# Patient Record
Sex: MALE | Race: WHITE | Employment: OTHER | URBAN - METROPOLITAN AREA
[De-identification: names, ages, dates, MRNs, and addresses within clinical notes are randomized per-mention and may not be internally consistent; named-entity substitution may affect disease eponyms.]

---

## 2017-09-14 ENCOUNTER — APPOINTMENT (OUTPATIENT)
Dept: GENERAL RADIOLOGY | Age: 82
DRG: 065 | End: 2017-09-14
Attending: EMERGENCY MEDICINE
Payer: MEDICARE

## 2017-09-14 ENCOUNTER — APPOINTMENT (OUTPATIENT)
Dept: CT IMAGING | Age: 82
DRG: 065 | End: 2017-09-14
Attending: EMERGENCY MEDICINE
Payer: MEDICARE

## 2017-09-14 ENCOUNTER — APPOINTMENT (OUTPATIENT)
Dept: ULTRASOUND IMAGING | Age: 82
DRG: 065 | End: 2017-09-14
Attending: NURSE PRACTITIONER
Payer: MEDICARE

## 2017-09-14 ENCOUNTER — HOSPITAL ENCOUNTER (INPATIENT)
Age: 82
LOS: 8 days | Discharge: NURSING, REHAB OR LONG-TERM CARE | DRG: 065 | End: 2017-09-22
Attending: EMERGENCY MEDICINE | Admitting: INTERNAL MEDICINE
Payer: MEDICARE

## 2017-09-14 DIAGNOSIS — I63.311 CEREBROVASCULAR ACCIDENT (CVA) DUE TO THROMBOSIS OF RIGHT MIDDLE CEREBRAL ARTERY (HCC): Primary | ICD-10-CM

## 2017-09-14 PROBLEM — I63.9 STROKE (HCC): Status: ACTIVE | Noted: 2017-09-14

## 2017-09-14 LAB
ALBUMIN SERPL-MCNC: 3.4 G/DL (ref 3.2–4.6)
ALBUMIN/GLOB SERPL: 1 {RATIO} (ref 1.2–3.5)
ALP SERPL-CCNC: 67 U/L (ref 50–136)
ALT SERPL-CCNC: 32 U/L (ref 12–65)
ANION GAP SERPL CALC-SCNC: 11 MMOL/L (ref 7–16)
AST SERPL-CCNC: 37 U/L (ref 15–37)
ATRIAL RATE: 300 BPM
BASOPHILS # BLD: 0 K/UL (ref 0–0.2)
BASOPHILS NFR BLD: 0 % (ref 0–2)
BILIRUB SERPL-MCNC: 0.6 MG/DL (ref 0.2–1.1)
BUN SERPL-MCNC: 38 MG/DL (ref 8–23)
CALCIUM SERPL-MCNC: 9.1 MG/DL (ref 8.3–10.4)
CALCULATED R AXIS, ECG10: 116 DEGREES
CALCULATED T AXIS, ECG11: 24 DEGREES
CHLORIDE SERPL-SCNC: 106 MMOL/L (ref 98–107)
CO2 SERPL-SCNC: 23 MMOL/L (ref 21–32)
CREAT SERPL-MCNC: 1.39 MG/DL (ref 0.8–1.5)
DIAGNOSIS, 93000: NORMAL
DIFFERENTIAL METHOD BLD: ABNORMAL
EOSINOPHIL # BLD: 0 K/UL (ref 0–0.8)
EOSINOPHIL NFR BLD: 0 % (ref 0.5–7.8)
ERYTHROCYTE [DISTWIDTH] IN BLOOD BY AUTOMATED COUNT: 13.3 % (ref 11.9–14.6)
GLOBULIN SER CALC-MCNC: 3.5 G/DL (ref 2.3–3.5)
GLUCOSE BLD STRIP.AUTO-MCNC: 121 MG/DL (ref 65–100)
GLUCOSE SERPL-MCNC: 126 MG/DL (ref 65–100)
HCT VFR BLD AUTO: 43.6 % (ref 41.1–50.3)
HGB BLD-MCNC: 15.6 G/DL (ref 13.6–17.2)
IMM GRANULOCYTES # BLD: 0 K/UL (ref 0–0.5)
IMM GRANULOCYTES NFR BLD: 0.1 % (ref 0–5)
INR BLD: 0.9 (ref 0.9–1.2)
LYMPHOCYTES # BLD: 0.7 K/UL (ref 0.5–4.6)
LYMPHOCYTES NFR BLD: 8 % (ref 13–44)
MCH RBC QN AUTO: 33.1 PG (ref 26.1–32.9)
MCHC RBC AUTO-ENTMCNC: 35.8 G/DL (ref 31.4–35)
MCV RBC AUTO: 92.4 FL (ref 79.6–97.8)
MONOCYTES # BLD: 0.5 K/UL (ref 0.1–1.3)
MONOCYTES NFR BLD: 6 % (ref 4–12)
NEUTS SEG # BLD: 7.7 K/UL (ref 1.7–8.2)
NEUTS SEG NFR BLD: 86 % (ref 43–78)
PLATELET # BLD AUTO: 147 K/UL (ref 150–450)
PMV BLD AUTO: 12.3 FL (ref 10.8–14.1)
POTASSIUM SERPL-SCNC: 4.3 MMOL/L (ref 3.5–5.1)
PROT SERPL-MCNC: 6.9 G/DL (ref 6.3–8.2)
PT BLD: 11.4 SECS (ref 9.6–11.6)
Q-T INTERVAL, ECG07: 396 MS
QRS DURATION, ECG06: 98 MS
QTC CALCULATION (BEZET), ECG08: 433 MS
RBC # BLD AUTO: 4.72 M/UL (ref 4.23–5.67)
SODIUM SERPL-SCNC: 140 MMOL/L (ref 136–145)
TROPONIN I BLD-MCNC: 0.01 NG/ML (ref 0.02–0.05)
VENTRICULAR RATE, ECG03: 72 BPM
WBC # BLD AUTO: 8.9 K/UL (ref 4.3–11.1)

## 2017-09-14 PROCEDURE — 82962 GLUCOSE BLOOD TEST: CPT

## 2017-09-14 PROCEDURE — 85610 PROTHROMBIN TIME: CPT | Performed by: EMERGENCY MEDICINE

## 2017-09-14 PROCEDURE — 77030032490 HC SLV COMPR SCD KNE COVD -B

## 2017-09-14 PROCEDURE — 74011250637 HC RX REV CODE- 250/637: Performed by: INTERNAL MEDICINE

## 2017-09-14 PROCEDURE — 71010 XR CHEST PORT: CPT

## 2017-09-14 PROCEDURE — 86580 TB INTRADERMAL TEST: CPT | Performed by: INTERNAL MEDICINE

## 2017-09-14 PROCEDURE — 93880 EXTRACRANIAL BILAT STUDY: CPT

## 2017-09-14 PROCEDURE — 85025 COMPLETE CBC W/AUTO DIFF WBC: CPT | Performed by: EMERGENCY MEDICINE

## 2017-09-14 PROCEDURE — 93005 ELECTROCARDIOGRAM TRACING: CPT | Performed by: EMERGENCY MEDICINE

## 2017-09-14 PROCEDURE — 84484 ASSAY OF TROPONIN QUANT: CPT

## 2017-09-14 PROCEDURE — 99285 EMERGENCY DEPT VISIT HI MDM: CPT | Performed by: EMERGENCY MEDICINE

## 2017-09-14 PROCEDURE — 65660000000 HC RM CCU STEPDOWN

## 2017-09-14 PROCEDURE — 94762 N-INVAS EAR/PLS OXIMTRY CONT: CPT | Performed by: EMERGENCY MEDICINE

## 2017-09-14 PROCEDURE — 85610 PROTHROMBIN TIME: CPT

## 2017-09-14 PROCEDURE — 80053 COMPREHEN METABOLIC PANEL: CPT | Performed by: EMERGENCY MEDICINE

## 2017-09-14 PROCEDURE — 74011250636 HC RX REV CODE- 250/636: Performed by: INTERNAL MEDICINE

## 2017-09-14 PROCEDURE — 92610 EVALUATE SWALLOWING FUNCTION: CPT

## 2017-09-14 PROCEDURE — 70450 CT HEAD/BRAIN W/O DYE: CPT

## 2017-09-14 PROCEDURE — 74011000302 HC RX REV CODE- 302: Performed by: INTERNAL MEDICINE

## 2017-09-14 RX ORDER — METOPROLOL TARTRATE 5 MG/5ML
5 INJECTION INTRAVENOUS
Status: DISCONTINUED | OUTPATIENT
Start: 2017-09-14 | End: 2017-09-15

## 2017-09-14 RX ORDER — ACETAMINOPHEN 650 MG/1
650 SUPPOSITORY RECTAL
Status: DISCONTINUED | OUTPATIENT
Start: 2017-09-14 | End: 2017-09-22 | Stop reason: HOSPADM

## 2017-09-14 RX ORDER — MORPHINE SULFATE 2 MG/ML
1 INJECTION, SOLUTION INTRAMUSCULAR; INTRAVENOUS
Status: DISCONTINUED | OUTPATIENT
Start: 2017-09-14 | End: 2017-09-15

## 2017-09-14 RX ORDER — SODIUM CHLORIDE 0.9 % (FLUSH) 0.9 %
5-10 SYRINGE (ML) INJECTION AS NEEDED
Status: DISCONTINUED | OUTPATIENT
Start: 2017-09-14 | End: 2017-09-22 | Stop reason: HOSPADM

## 2017-09-14 RX ORDER — METOPROLOL TARTRATE 25 MG/1
12.5 TABLET, FILM COATED ORAL DAILY
Status: DISCONTINUED | OUTPATIENT
Start: 2017-09-15 | End: 2017-09-14

## 2017-09-14 RX ORDER — ENALAPRILAT 1.25 MG/ML
0.62 INJECTION INTRAVENOUS
Status: DISCONTINUED | OUTPATIENT
Start: 2017-09-14 | End: 2017-09-15

## 2017-09-14 RX ORDER — ASPIRIN 325 MG
325 TABLET ORAL ONCE
Status: COMPLETED | OUTPATIENT
Start: 2017-09-14 | End: 2017-09-14

## 2017-09-14 RX ORDER — FUROSEMIDE 40 MG/1
40 TABLET ORAL DAILY
Status: CANCELLED | OUTPATIENT
Start: 2017-09-15

## 2017-09-14 RX ORDER — CHOLECALCIFEROL (VITAMIN D3) 125 MCG
1 CAPSULE ORAL DAILY
COMMUNITY
End: 2018-01-25

## 2017-09-14 RX ORDER — ATORVASTATIN CALCIUM 40 MG/1
40 TABLET, FILM COATED ORAL DAILY
Status: DISCONTINUED | OUTPATIENT
Start: 2017-09-15 | End: 2017-09-16

## 2017-09-14 RX ORDER — HYDRALAZINE HYDROCHLORIDE 20 MG/ML
10 INJECTION INTRAMUSCULAR; INTRAVENOUS
Status: DISCONTINUED | OUTPATIENT
Start: 2017-09-14 | End: 2017-09-22 | Stop reason: HOSPADM

## 2017-09-14 RX ORDER — TAMSULOSIN HYDROCHLORIDE 0.4 MG/1
0.4 CAPSULE ORAL DAILY
Status: DISCONTINUED | OUTPATIENT
Start: 2017-09-15 | End: 2017-09-14

## 2017-09-14 RX ORDER — ASPIRIN 325 MG
325 TABLET ORAL DAILY
Status: DISCONTINUED | OUTPATIENT
Start: 2017-09-15 | End: 2017-09-15

## 2017-09-14 RX ORDER — AMLODIPINE BESYLATE 5 MG/1
5 TABLET ORAL DAILY
Status: DISCONTINUED | OUTPATIENT
Start: 2017-09-15 | End: 2017-09-14

## 2017-09-14 RX ORDER — SODIUM CHLORIDE 9 MG/ML
75 INJECTION, SOLUTION INTRAVENOUS CONTINUOUS
Status: DISCONTINUED | OUTPATIENT
Start: 2017-09-14 | End: 2017-09-22 | Stop reason: HOSPADM

## 2017-09-14 RX ORDER — SODIUM CHLORIDE 0.9 % (FLUSH) 0.9 %
5-10 SYRINGE (ML) INJECTION EVERY 8 HOURS
Status: DISCONTINUED | OUTPATIENT
Start: 2017-09-14 | End: 2017-09-22 | Stop reason: HOSPADM

## 2017-09-14 RX ORDER — KETOROLAC TROMETHAMINE 15 MG/ML
15 INJECTION, SOLUTION INTRAMUSCULAR; INTRAVENOUS
Status: DISCONTINUED | OUTPATIENT
Start: 2017-09-14 | End: 2017-09-15

## 2017-09-14 RX ORDER — LISINOPRIL 20 MG/1
40 TABLET ORAL DAILY
Status: DISCONTINUED | OUTPATIENT
Start: 2017-09-15 | End: 2017-09-14

## 2017-09-14 RX ORDER — ENOXAPARIN SODIUM 100 MG/ML
40 INJECTION SUBCUTANEOUS EVERY 24 HOURS
Status: DISCONTINUED | OUTPATIENT
Start: 2017-09-14 | End: 2017-09-22 | Stop reason: HOSPADM

## 2017-09-14 RX ADMIN — SODIUM CHLORIDE 75 ML/HR: 900 INJECTION, SOLUTION INTRAVENOUS at 16:11

## 2017-09-14 RX ADMIN — ASPIRIN 325 MG ORAL TABLET 325 MG: 325 PILL ORAL at 14:41

## 2017-09-14 RX ADMIN — ENOXAPARIN SODIUM 40 MG: 40 INJECTION SUBCUTANEOUS at 16:11

## 2017-09-14 RX ADMIN — TUBERCULIN PURIFIED PROTEIN DERIVATIVE 5 UNITS: 5 INJECTION, SOLUTION INTRADERMAL at 18:07

## 2017-09-14 NOTE — IP AVS SNAPSHOT
Guzman Rivera 
 
 
 [address] [facility] [address] [facility] 
[phone] Patient: Fani Sanchez 
MRN: [id number] :1934 Current Discharge Medication List  
  
ASK your doctor about these medications Dose & Instructions Dispensing Information Comments Morning Noon Evening Bedtime  
 amLODIPine 5 mg tablet Commonly known as:  Chandra Wright Your last dose was: Your next dose is:    
   
   
 Dose:  5 mg Take 5 mg by mouth every morning. Refills:  0  
     
   
   
   
  
 arginine 500 mg tablet Your last dose was: Your next dose is:    
   
   
 Dose:  500 mg Take 500 mg by mouth every morning. Refills:  0  
     
   
   
   
  
 aspirin 81 mg chewable tablet Your last dose was: Your next dose is:    
   
   
 Dose:  81 mg Take 1 Tab by mouth daily. Refills:  0  
     
   
   
   
  
 atorvastatin 40 mg tablet Commonly known as:  LIPITOR Your last dose was: Your next dose is:    
   
   
 Dose:  40 mg Take 40 mg by mouth daily. Refills:  0  
     
   
   
   
  
 esomeprazole 20 mg capsule Commonly known as:  [employer] Your last dose was: Your next dose is:    
   
   
 Dose:  20 mg Take 20 mg by mouth daily. Refills:  0  
     
   
   
   
  
 FLOMAX 0.4 mg capsule Generic drug:  tamsulosin Your last dose was: Your next dose is:    
   
   
 Dose:  0.4 mg Take 0.4 mg by mouth daily. Refills:  0  
     
   
   
   
  
 furosemide 40 mg tablet Commonly known as:  LASIX Your last dose was: Your next dose is:    
   
   
 Dose:  40 mg Take 1 Tab by mouth daily. Quantity:  90 Tab Refills:  3 GLUCOSAMINE &CHONDROIT-MV-MIN3 PO Your last dose was: Your next dose is:    
   
   
 Dose:  2 Tab Take 2 Tabs by mouth every morning. Refills:  0  GLUCOSAMINE 1500 COMPLEX PO  
   
 Your last dose was: Your next dose is:    
   
   
 Dose:  1 Tab Take 1 Tab by mouth daily. Refills:  0  
     
   
   
   
  
 lisinopril 40 mg tablet Commonly known as:  Ba Sharona Your last dose was: Your next dose is:    
   
   
 Dose:  40 mg Take 1 Tab by mouth every morning. Quantity:  30 Tab Refills:  6  
     
   
   
   
  
 lutein 6 mg Cap Your last dose was: Your next dose is:    
   
   
 Dose:  1 Tab Take 1 Tab by mouth every morning. Refills:  0  
     
   
   
   
  
 magnesium 250 mg Tab Your last dose was: Your next dose is:    
   
   
 Dose:  250 mg Take 250 mg by mouth daily. Refills:  0  
     
   
   
   
  
 metoprolol tartrate 25 mg tablet Commonly known as:  LOPRESSOR Your last dose was: Your next dose is:    
   
   
 Dose:  12.5 mg Take 0.5 Tabs by mouth every morning. Quantity:  30 Tab Refills:  6 MULTI-VITAMIN PO Your last dose was: Your next dose is:    
   
   
 Dose:  1 Tab Take 1 Tab by mouth every morning. Refills:  0  
     
   
   
   
  
 naproxen 500 mg tablet Commonly known as:  NAPROSYN Your last dose was: Your next dose is: TAKE ONE TABLET BY MOUTH ONCE DAILY AS NEEDED Quantity:  30 Tab Refills:  0  
     
   
   
   
  
 nitroglycerin 0.4 mg SL tablet Commonly known as:  NITROSTAT Your last dose was: Your next dose is:    
   
   
 Dose:  0.4 mg  
1 Tab by SubLINGual route every five (5) minutes as needed for Chest Pain. Quantity:  2 Bottle Refills:  4  
     
   
   
   
  
 omega-3 fatty acids 1,000 mg Cap Your last dose was: Your next dose is:    
   
   
 Dose:  1 Tab Take 1 Tab by mouth every morning. Refills:  0 OMEGA-3 PO Your last dose was:     
   
Your next dose is:    
   
   
 Dose:  1200 mg  
 Take 1,200 mg by mouth daily. Refills:  0  
     
   
   
   
  
 potassium 99 mg tablet Your last dose was: Your next dose is:    
   
   
 Dose:  99 mg Take 99 mg by mouth daily. Refills:  0  
     
   
   
   
  
 SAW PALMETTO PO What changed:  Another medication with the same name was removed. Continue taking this medication, and follow the directions you see here. Your last dose was: Your next dose is:    
   
   
 Dose:  450 mg Take 450 mg by mouth daily. Refills:  0  
     
   
   
   
  
 sildenafil citrate 100 mg tablet Commonly known as:  VIAGRA Your last dose was: Your next dose is:    
   
   
 Dose:  100 mg Take 1 Tab by mouth as needed. Quantity:  10 Tab Refills:  3 VITAMIN C 1,000 mg tablet Generic drug:  ascorbic acid (vitamin C) Your last dose was: Your next dose is:    
   
   
 Dose:  1000 mg Take 1,000 mg by mouth every morning. Refills:  0  
     
   
   
   
  
 VITAMIN D3 2,000 unit Tab Generic drug:  cholecalciferol (vitamin D3) Your last dose was: Your next dose is:    
   
   
 Dose:  1 Tab Take 1 Tab by mouth daily. Refills:  0  
     
   
   
   
  
 zinc 50 mg capsule Your last dose was: Your next dose is:    
   
   
 Dose:  50 mg Take 50 mg by mouth every morning. Refills:  0

## 2017-09-14 NOTE — PROGRESS NOTES
TRANSFER - IN REPORT:    Verbal report received from Denisha(name) on Dorinda Segovia  being received from ED(unit) for routine progression of care      Report consisted of patients Situation, Background, Assessment and   Recommendations(SBAR). Information from the following report(s) SBAR, Kardex and MAR was reviewed with the receiving nurse. Opportunity for questions and clarification was provided. Assessment completed upon patients arrival to unit and care assumed.

## 2017-09-14 NOTE — ED TRIAGE NOTES
Pt arrives via EMS via stroke alert. PT was last seen at norm at 230AM, pt is having obvious left sided neglect. No blood thinners, no hx of stroke.   [id number], NKA, 190/80  RACE score 6 or 7 per EMS

## 2017-09-14 NOTE — ED NOTES
TRANSFER - OUT REPORT:    Verbal report given to Jyotsna Soriano RN on Carmen Villanueva  being transferred to 7th floor for routine progression of care       Report consisted of patients Situation, Background, Assessment and   Recommendations(SBAR). Information from the following report(s) ED Summary was reviewed with the receiving nurse. Lines:   Peripheral IV 09/14/17 Left Antecubital (Active)   Site Assessment Clean, dry, & intact 9/14/2017  1:04 PM   Phlebitis Assessment 0 9/14/2017  1:04 PM   Infiltration Assessment 0 9/14/2017  1:04 PM   Dressing Status Clean, dry, & intact 9/14/2017  1:04 PM       Peripheral IV 09/14/17 Right Antecubital (Active)   Site Assessment Clean, dry, & intact 9/14/2017  1:04 PM   Phlebitis Assessment 0 9/14/2017  1:04 PM   Infiltration Assessment 0 9/14/2017  1:04 PM   Dressing Status Clean, dry, & intact 9/14/2017  1:04 PM        Opportunity for questions and clarification was provided.       Patient transported with:   [employer]

## 2017-09-14 NOTE — PROGRESS NOTES
Primary Nurse Pratima Argueta and Quiana Craig, RN performed a dual skin assessment on this patient No impairment noted  Julian score is 13

## 2017-09-14 NOTE — H&P
HOSPITALIST H&P/CONSULT  NAME:  Merlinda Downing   Age:  80 y.o.  :   1934   MRN:   [id number]  PCP: Cedric Rios MD  Consulting MD:  Treatment Team: Attending Provider: Ty Mendez MD; Primary Nurse: Flor Mccarty RN  HPI:     Pt is a 79 yo male who presents to ER this afternoon with left facial droop, slurred speech, LUE weakness/numbness noted on awakening a few hours ago. Pt was last seen \"normal\" at 0230 this morning. PMH includes CAD, CABG, paroxysmal A Fib, HTN, HLD. CT head reveals right MCA infarct. Pt given  mg. He does not participate full in NIHSS scale due to drowsiness but neuro exam reveals A&O x 3 although some word salad but eventually answers orientation questions correctly, slurred speech, left facial droop, LUE with flexion to pain but no purposeful movement, dulled sensation LUE, left side neglect, eyes do not cross midline to left. He has already been evaluated by speech who recommends continued NPO for now due to delayed swallowing. He is in A Fib on monitor. BP slightly elevated, has not had any of his typical am meds. Follows with [employer]. Wife at bedside and plan of care discussed thoroughly.       Complete ROS done and is as stated in HPI or otherwise negative  Past Medical History:   Diagnosis Date    Arrhythmia     reason for current procedure    Arthritis     osteo    CAD (coronary artery disease) 1999    CABG no stents    Carotid artery stenosis without cerebral infarction 2016    Chronic pain     arthritic    Claudication [facility])     Coronary atherosclerosis of native coronary artery 2016    GERD (gastroesophageal reflux disease)     controlled with meds    Hyperlipidemia     Hypertension     controlled with meds    Kidney stones     hx of    MI ([facility] infarction) [facility] [address] 1999    PAF (paroxysmal atrial fibrillation) [facility] [address]     Thromboembolus ([facility] [address]       Past Surgical History:   Procedure Laterality Date    ABDOMEN SURGERY PROC UNLISTED  ? hernia repair    CARDIAC SURG PROCEDURE UNLIST  1999    CABG no stents    HX APPENDECTOMY  age 15    HX HEENT  12's?    left cataract    HX PTCA        Prior to Admission Medications   Prescriptions Last Dose Informant Patient Reported? Taking? GLUCOSAM & CHONDROIT-MV & MIN3 (GLUCOSAMINE &CHONDROIT-MV-MIN3 PO)   Yes No   Sig: Take 2 Tabs by mouth every morning. MULTI-VITAMIN PO   Yes No   Sig: Take 1 Tab by mouth every morning. amlodipine (NORVASC) 5 mg tablet   Yes No   Sig: Take 5 mg by mouth every morning. arginine 500 mg tablet   Yes No   Sig: Take 500 mg by mouth every morning. ascorbic acid (VITAMIN C) 1,000 mg tablet   Yes No   Sig: Take 1,000 mg by mouth every morning. aspirin 81 mg chewable tablet   Yes No   Sig: Take 1 Tab by mouth daily. atorvastatin (LIPITOR) 40 mg tablet   Yes No   Sig: Take 40 mg by mouth daily. esomeprazole (NEXIUM) 20 mg capsule   Yes No   Sig: Take 20 mg by mouth daily. furosemide (LASIX) 40 mg tablet   No No   Sig: Take 1 Tab by mouth daily. glipiZIDE (GLUCOTROL) 5 mg tablet   Yes No   Sig: Take  by mouth two (2) times a day. Prn per pt   lisinopril (PRINIVIL, ZESTRIL) 40 mg tablet   No No   Sig: Take 1 Tab by mouth every morning. lutein 6 mg Cap   Yes No   Sig: Take 1 Tab by mouth every morning.   magnesium 250 mg tab   Yes No   Sig: Take 250 mg by mouth daily. metoprolol tartrate (LOPRESSOR) 25 mg tablet   No No   Sig: Take 0.5 Tabs by mouth every morning. naproxen (NAPROSYN) 500 mg tablet   No No   Sig: TAKE ONE TABLET BY MOUTH ONCE DAILY AS NEEDED   nitroglycerin (NITROSTAT) 0.4 mg SL tablet   No No   Si Tab by SubLINGual route every five (5) minutes as needed for Chest Pain. omega-3 fatty acids 1,000 mg Cap   Yes No   Sig: Take 1 Tab by mouth every morning. saw palmetto 160 mg Cap   Yes No   Sig: Take 1 Tab by mouth every morning.    sildenafil citrate (VIAGRA) 100 mg tablet   No No   Sig: Take 1 Tab by mouth as needed. tamsulosin (FLOMAX) 0.4 mg capsule   Yes No   Sig: Take 0.4 mg by mouth daily. zinc 50 mg capsule   Yes No   Sig: Take 50 mg by mouth every morning. Facility-Administered Medications: None     No Known Allergies   Social History   Substance Use Topics    Smoking status: Former Smoker     Packs/day: 1.50     Years: 35.00     Quit date: 10/1/1986    Smokeless tobacco: Not on file      Comment: quit     Alcohol use 4.0 oz/week     7 Glasses of wine, 1 Shots of liquor per week      Comment: minimal      Family History   Problem Relation Age of Onset    Coronary Artery Disease Other      family history      Objective:     Visit Vitals    /86    Pulse 80    Temp 97.8 °F (36.6 °C)    Resp 16    Ht 5' 10\" (1.778 m)    Wt 69.4 kg (153 lb)    SpO2 94%    BMI 21.95 kg/m2      Temp (24hrs), Av.8 °F (36.6 °C), Min:97.8 °F (36.6 °C), Max:97.8 °F (36.6 °C)    Oxygen Therapy  O2 Sat (%): 94 % (17 1430)  Pulse via Oximetry: 78 beats per minute (17 1430)    Physical Exam:  General:    Alert, cooperative, no distress, appears stated age. Head:   Normocephalic, without obvious abnormality, atraumatic. Nose:  Nares normal. No drainage or sinus tenderness. Lungs:   Clear to auscultation bilaterally. No Wheezing or Rhonchi. No rales. Heart:   Irreg irreg,  no murmur, rub or gallop. Abdomen:   Soft, non-tender. Not distended. Bowel sounds normal.   Extremities: No cyanosis. No edema. No clubbing  Skin:     Texture, turgor normal. No rashes or lesions.   Not Jaundiced  Neurologic: Alert and oriented x 3 although some word salad but eventually answers orientation questions correctly, slurred speech, left facial   droop, LUE with flexion to pain but no purposeful movement, dulled sensation LUE, left side neglect, eyes do not cross midline to left completely     Data Review:   Recent Results (from the past 24 hour(s))   EKG, 12 LEAD, INITIAL    Collection Time: 09/14/17 12:50 PM   Result Value Ref Range    Ventricular Rate 72 BPM    Atrial Rate 300 BPM    QRS Duration 98 ms    Q-T Interval 396 ms    QTC Calculation (Bezet) 433 ms    Calculated R Axis 116 degrees    Calculated T Axis 24 degrees    Diagnosis       !! AGE AND GENDER SPECIFIC ECG ANALYSIS !! Atrial fibrillation  Lateral infarct (cited on or before 07-NOV-2015)  Abnormal ECG  When compared with ECG of 07-NOV-2015 20:52,  Atrial fibrillation has replaced Sinus rhythm  ST now depressed in Inferior leads     GLUCOSE, POC    Collection Time: 09/14/17 12:56 PM   Result Value Ref Range    Glucose (POC) 121 (H) 65 - 100 mg/dL   CBC WITH AUTOMATED DIFF    Collection Time: 09/14/17 12:57 PM   Result Value Ref Range    WBC 8.9 4.3 - 11.1 K/uL    RBC 4.72 4.23 - 5.67 M/uL    HGB 15.6 13.6 - 17.2 g/dL    HCT 43.6 41.1 - 50.3 %    MCV 92.4 79.6 - 97.8 FL    MCH 33.1 (H) 26.1 - 32.9 PG    MCHC 35.8 (H) 31.4 - 35.0 g/dL    RDW 13.3 11.9 - 14.6 %    PLATELET 735 (L) 988 - 450 K/uL    MPV 12.3 10.8 - 14.1 FL    DF AUTOMATED      NEUTROPHILS 86 (H) 43 - 78 %    LYMPHOCYTES 8 (L) 13 - 44 %    MONOCYTES 6 4.0 - 12.0 %    EOSINOPHILS 0 (L) 0.5 - 7.8 %    BASOPHILS 0 0.0 - 2.0 %    IMMATURE GRANULOCYTES 0.1 0.0 - 5.0 %    ABS. NEUTROPHILS 7.7 1.7 - 8.2 K/UL    ABS. LYMPHOCYTES 0.7 0.5 - 4.6 K/UL    ABS. MONOCYTES 0.5 0.1 - 1.3 K/UL    ABS. EOSINOPHILS 0.0 0.0 - 0.8 K/UL    ABS. BASOPHILS 0.0 0.0 - 0.2 K/UL    ABS. IMM.  GRANS. 0.0 0.0 - 0.5 K/UL   METABOLIC PANEL, COMPREHENSIVE    Collection Time: 09/14/17 12:57 PM   Result Value Ref Range    Sodium 140 136 - 145 mmol/L    Potassium 4.3 3.5 - 5.1 mmol/L    Chloride 106 98 - 107 mmol/L    CO2 23 21 - 32 mmol/L    Anion gap 11 7 - 16 mmol/L    Glucose 126 (H) 65 - 100 mg/dL    BUN 38 (H) 8 - 23 MG/DL    Creatinine 1.39 0.8 - 1.5 MG/DL    GFR est AA >60 >60 ml/min/1.73m2    GFR est non-AA 52 (L) >60 ml/min/1.73m2    Calcium 9.1 8.3 - 10.4 MG/DL    Bilirubin, total 0.6 0.2 - 1.1 MG/DL ALT (SGPT) 32 12 - 65 U/L    AST (SGOT) 37 15 - 37 U/L    Alk. phosphatase 67 50 - 136 U/L    Protein, total 6.9 6.3 - 8.2 g/dL    Albumin 3.4 3.2 - 4.6 g/dL    Globulin 3.5 2.3 - 3.5 g/dL    A-G Ratio 1.0 (L) 1.2 - 3.5     POC TROPONIN-I    Collection Time: 09/14/17  1:00 PM   Result Value Ref Range    Troponin-I (POC) 0.01 (L) 0.02 - 0.05 ng/ml   POC PT/INR    Collection Time: 09/14/17  1:01 PM   Result Value Ref Range    Prothrombin time (POC) 11.4 9.6 - 11.6 SECS    INR (POC) 0.9 0.9 - 1.2       Imaging /Procedures /Studies     9/14 CT Head IMPRESSION: Probable subacute right MCA CVA. Note: If a subtle CVA is suspected, MRI would be more definitive if clinically warranted. Assessment and Plan: Active Hospital Problems    Diagnosis Date Noted    Stroke [facility]) 09/14/2017       A/P:    Acute right MCA infarct- Increase ASA to 325 mg, cont Lipitor. Check MRI brain, carotid duplex, echo. Remote tele. Cont ST/PT/OT. Nursing to repeat NIHSS scale q shift x 24 hours. A Fib- Will need to start on [facility] but will hold off on this in setting of acute CVA. Cont Lopressor    HTN- Cont home regimen Norvasc, Lisinopril. Hold Lasix while pt is NPO.   PRN Apresoline for SBP > 180     DC planning- Consult SW for possible home health needs, will place ppd incase STR indicated    DVT Prophylaxis:  Lovenox   Code Status: Full   Anticipated discharge: 48-72 hours     Signed By: Christelle Morrow NP     September 14, 2017

## 2017-09-14 NOTE — PROGRESS NOTES
Admission database completed. Patient oriented to room and call button.    Medication side effects fact sheet and stroke education book given to patient and spouse and reviewed No concerns voiced at this time Primary nurse  updated

## 2017-09-14 NOTE — ED NOTES
Pt given aspirin tablet crushed and in applesauce per SLP recommendation. On dysphagia screen, pt had difficulty with liquids at this time. RN at bedside to administer medication. Pt tolerated applesauce.

## 2017-09-14 NOTE — ED PROVIDER NOTES
HPI Comments: shana went to bed at 2:30am this morning. Woke up an hour ago and wife noted patient to have left facial droop and left upper and lower extremity weakness. EMS brought patient here. Patient is a 80 y.o. male presenting with weakness. The history is provided by the patient and the EMS personnel. No  was used. Extremity Weakness    This is a new problem. Episode onset: 0230. The problem occurs constantly. The problem has not changed since onset. The patient is experiencing no pain. Associated symptoms include limited range of motion. He has tried nothing for the symptoms. Past Medical History:   Diagnosis Date    Arrhythmia     reason for current procedure    Arthritis     osteo    CAD (coronary artery disease) 12/1999    CABG no stents    Carotid artery stenosis without cerebral infarction 1/18/2016    Chronic pain     arthritic    Claudication [facility])     Coronary atherosclerosis of native coronary artery 1/18/2016    GERD (gastroesophageal reflux disease)     controlled with meds    Hyperlipidemia     Hypertension     controlled with meds    Kidney stones     hx of    MI (myocardial infarction) ([facility] [address] 12/1999    PAF [facility]oxysmal atrial fibrillation) [facility] [facility]     Thromboembolus ([facility] [address]        Past Surgical History:   Procedure Laterality Date    ABDOMEN SURGERY PROC UNLISTED  2000? hernia repair    CARDIAC SURG PROCEDURE UNLIST  12/1999    CABG no stents    HX APPENDECTOMY  age 15    HX HEENT  12's?    left cataract    HX PTCA           Family History:   Problem Relation Age of Onset    Coronary Artery Disease Other      family history       Social History     Social History    Marital status:      Spouse name: N/A    Number of children: N/A    Years of education: N/A     Occupational History    Not on file.      Social History Main Topics    Smoking status: Former Smoker     Packs/day: 1.50     Years: 35.00     Quit date: 10/1/1986    Smokeless tobacco: Not on file      Comment: quit 1980's    Alcohol use 4.0 oz/week     7 Glasses of wine, 1 Shots of liquor per week      Comment: minimal    Drug use: No    Sexual activity: Not on file     Other Topics Concern    Not on file     Social History Narrative         ALLERGIES: Review of patient's allergies indicates no known allergies. Review of Systems   Unable to perform ROS: Mental status change   Neurological: Positive for weakness. There were no vitals filed for this visit. Physical Exam   Constitutional: He appears well-developed and well-nourished. HENT:   Head: Normocephalic and atraumatic. Eyes: Conjunctivae are normal. Pupils are equal, round, and reactive to light. Rightward deviated gaze. Neck: Normal range of motion. Neck supple. Cardiovascular: Normal rate and regular rhythm. No murmur heard. Pulmonary/Chest: Effort normal. He has no wheezes. Slightly coarse bilaterally. Abdominal: Soft. Bowel sounds are normal. There is no tenderness. Musculoskeletal: He exhibits no edema or tenderness. Neurological: He is alert. He is disoriented. A cranial nerve deficit (left facial droop) is present. He exhibits abnormal muscle tone (left upper and lower extremity weakness. ). Skin: Skin is warm and dry. Nursing note and vitals reviewed. MDM  Number of Diagnoses or Management Options  Diagnosis management comments: Right sided stroke. Will admit. Amount and/or Complexity of Data Reviewed  Clinical lab tests: ordered and reviewed  Tests in the radiology section of CPT®: ordered and reviewed  Tests in the medicine section of CPT®: ordered and reviewed    Patient Progress  Patient progress: stable    ED Course       Procedures      EKG: nonspecific ST and T waves changes, atrial fibrillation, rate 72.       Results Include:    Recent Results (from the past 24 hour(s))   EKG, 12 LEAD, INITIAL    Collection Time: 09/14/17 12:50 PM   Result Value Ref Range    Ventricular Rate 72 BPM    Atrial Rate 300 BPM    QRS Duration 98 ms    Q-T Interval 396 ms    QTC Calculation (Bezet) 433 ms    Calculated R Axis 116 degrees    Calculated T Axis 24 degrees    Diagnosis       !! AGE AND GENDER SPECIFIC ECG ANALYSIS !! Atrial fibrillation  Lateral infarct (cited on or before 07-NOV-2015)  Abnormal ECG  When compared with ECG of 07-NOV-2015 20:52,  Atrial fibrillation has replaced Sinus rhythm  ST now depressed in Inferior leads     GLUCOSE, POC    Collection Time: 09/14/17 12:56 PM   Result Value Ref Range    Glucose (POC) 121 (H) 65 - 100 mg/dL   CBC WITH AUTOMATED DIFF    Collection Time: 09/14/17 12:57 PM   Result Value Ref Range    WBC 8.9 4.3 - 11.1 K/uL    RBC 4.72 4.23 - 5.67 M/uL    HGB 15.6 13.6 - 17.2 g/dL    HCT 43.6 41.1 - 50.3 %    MCV 92.4 79.6 - 97.8 FL    MCH 33.1 (H) 26.1 - 32.9 PG    MCHC 35.8 (H) 31.4 - 35.0 g/dL    RDW 13.3 11.9 - 14.6 %    PLATELET 115 (L) 291 - 450 K/uL    MPV 12.3 10.8 - 14.1 FL    DF AUTOMATED      NEUTROPHILS 86 (H) 43 - 78 %    LYMPHOCYTES 8 (L) 13 - 44 %    MONOCYTES 6 4.0 - 12.0 %    EOSINOPHILS 0 (L) 0.5 - 7.8 %    BASOPHILS 0 0.0 - 2.0 %    IMMATURE GRANULOCYTES 0.1 0.0 - 5.0 %    ABS. NEUTROPHILS 7.7 1.7 - 8.2 K/UL    ABS. LYMPHOCYTES 0.7 0.5 - 4.6 K/UL    ABS. MONOCYTES 0.5 0.1 - 1.3 K/UL    ABS. EOSINOPHILS 0.0 0.0 - 0.8 K/UL    ABS. BASOPHILS 0.0 0.0 - 0.2 K/UL    ABS. IMM.  GRANS. 0.0 0.0 - 0.5 K/UL   METABOLIC PANEL, COMPREHENSIVE    Collection Time: 09/14/17 12:57 PM   Result Value Ref Range    Sodium 140 136 - 145 mmol/L    Potassium 4.3 3.5 - 5.1 mmol/L    Chloride 106 98 - 107 mmol/L    CO2 23 21 - 32 mmol/L    Anion gap 11 7 - 16 mmol/L    Glucose 126 (H) 65 - 100 mg/dL    BUN 38 (H) 8 - 23 MG/DL    Creatinine 1.39 0.8 - 1.5 MG/DL    GFR est AA >60 >60 ml/min/1.73m2    GFR est non-AA 52 (L) >60 ml/min/1.73m2    Calcium 9.1 8.3 - 10.4 MG/DL    Bilirubin, total 0.6 0.2 - 1.1 MG/DL    ALT (SGPT) 32 12 - 65 U/L AST (SGOT) 37 15 - 37 U/L    Alk. phosphatase 67 50 - 136 U/L    Protein, total 6.9 6.3 - 8.2 g/dL    Albumin 3.4 3.2 - 4.6 g/dL    Globulin 3.5 2.3 - 3.5 g/dL    A-G Ratio 1.0 (L) 1.2 - 3.5     POC TROPONIN-I    Collection Time: 09/14/17  1:00 PM   Result Value Ref Range    Troponin-I (POC) 0.01 (L) 0.02 - 0.05 ng/ml   POC PT/INR    Collection Time: 09/14/17  1:01 PM   Result Value Ref Range    Prothrombin time (POC) 11.4 9.6 - 11.6 SECS    INR (POC) 0.9 0.9 - 1.2         XR CHEST PORT (Final result) Result time: 09/14/17 13:12:10     Final result by Natalie Coleman MD (09/14/17 13:12:10)     Impression:     IMPRESSION: Status post median sternotomy, cardiomegaly, clear lung fields     Narrative:     Portable chest x-ray: 9/14/2017    Estonian: CVA    COMPARISON: November 7, 2015 Heart is enlarged. Lungs are clear. Midline  sternotomy sutures are noted and the soft tissues are intact.                 CT HEAD WO CONT (Final result) Result time: 09/14/17 13:14:14     Final result by Natalie Coleman MD (09/14/17 13:14:14)     Impression:     IMPRESSION: Probable subacute right MCA CVA. Note: If a subtle CVA is suspected,  MRI would be more definitive if clinically warranted.     Narrative:     CT scan of the brain 9/14/2017  Scanning was performed within 24 hours of arrival to the facility  Comparison: None  The MAA was adjusted using dose modulation to reduce patient radiation exposure. Indication: CVA  Findings: The brain parenchyma and ventricular structures are remarkable for  subtle lucency within the right frontal, temporal, parietal and occipital white  matter with associated effacement of the adjacent cortical sulci. There is no  hemorrhage.  Ventricles, calvarium, and the sinuses are intact.

## 2017-09-14 NOTE — PROGRESS NOTES
Problem: Dysphagia (Adult)  Goal: *Acute Goals and Plan of Care (Insert Text)  STG: Pt will consume trials pureed/nectar thick liquids with SLP only without signs/sx aspiration 100%. STG: Pt will complete oral motor exercises with 80% accuracy. STG: Pt will participate in a ST evaluation x1. LTG: Pt will tolerate least restrictive diet without signs/sx aspiration 100% for safe swallow function. SPEECH LANGUAGE PATHOLOGY: BEDSIDE SWALLOW NOTE: INITIAL ASSESSMENT     NAME/AGE/GENDER: Delmis Dallas is a 80 y.o. male  DATE: 9/14/2017  PRIMARY DIAGNOSIS: Stroke [facility])       ICD-10: Treatment Diagnosis: dysphagia, oropharyngeal 13.12  INTERDISCIPLINARY COLLABORATION: Registered Nurse and Physician  PRECAUTIONS/ALLERGIES: Review of patient's allergies indicates no known allergies. ASSESSMENT:   Based on the objective data described below, Mr. Cliff Moran presents with dysphagia, dysarthria, dysfluencies and facial weakness. Pt's spouse reported pt was choking on his secretions this date. Pt did become choked on his saliva x1 prior to po trials resulting in facial redness. Otherwise, he was just coughing. Pt awake and verbal but kept his eyes closed throughout the assessment. When asked to open them he reported they were open. Pt given trials thin/nectar thick liquids, pureed, and mixed consistency. Initially pt consumed thin via cup and straw without difficulty. Pt with left buccal pocketing with pureed which he was unable to clear with a cued lingual sweep as he didn't perform the sweep. SLP removed the trial.  Min mastication observed with mixed. Therefore, SLP removed the trial.  When pt was given thin via straw at the end of the assessment, + clinical signs/sx aspiration observed evidenced by strong coughing and facial redness. He consumed nectar thick liquids via cup and straw without signs/sx. However, at times he exhibited moderate oral holding. He would report he had swallowed when asked.   When asked to open his mouth to assess, liquids came out. Recommend NPO with crushed meds due to inconsistent oral delays. Will re-assess tomorrow. Discussed with pt, spouse, RN and MD.  All in agreement. Also needs a ST evaluation due to dysarthria characterized by a rapid rate and decreased precision. Moderate dysfluencies also observed evidenced by part and whole word repetitions which spouse reported is new onset. Patient will benefit from skilled intervention to address the below impairments. ?????? ? ? This section established at most recent assessment??????????  PROBLEM LIST (Impairments causing functional limitations):  1. Dysphagia  2. Facial weakness  3. Dysarthria  4. dysfluencies  REHABILITATION POTENTIAL FOR STATED GOALS: GOOD      PLAN OF CARE:   Patient will benefit from skilled intervention to address the following impairments. RECOMMENDATIONS AND PLANNED INTERVENTIONS (Benefits and precautions of therapy have been discussed with the patient.):  · NPO  MEDICATIONS:  · Crushed in puree  COMPENSATORY STRATEGIES/MODIFICATIONS INCLUDING:  · None  OTHER RECOMMENDATIONS (including follow up treatment recommendations):   · Oral motor exercises  · ST evaluation  · Po trials  RECOMMENDED DIET MODIFICATIONS DISCUSSED WITH:  · Hospitalist  · Nursing  · ER MD  · Patient  FREQUENCY/DURATION: Continue to follow patient 3 times a week for duration of hospital stay to address above goals. RECOMMENDED REHABILITATION/EQUIPMENT: (at time of discharge pending progress): Due to the probability of continued deficits (see above) this patient will likely need continued skilled speech therapy after discharge. SUBJECTIVE:   Pt cooperative. Pt's wife and good friend present. History of Present Injury/Illness: Mr. Rudy Leal  has a past medical history of Arrhythmia; Arthritis; CAD (coronary artery disease) (12/1999); Carotid artery stenosis without cerebral infarction (1/18/2016);  Chronic pain; Claudication ([facility] [address] Coronary atherosclerosis of native coronary artery (1/18/2016); GERD (gastroesophageal reflux disease); Hyperlipidemia; Hypertension; Kidney stones; MI (myocardial infarction) ([facility] [address] (12/1999); PAF (paroxysmal atrial fibrillation) ([facility] and Thromboembolus ([facility] .   He also  has a past surgical history that includes cardiac surg procedure unlist (12/1999); abdomen surgery proc unlisted (2000?); appendectomy (age 15); heent (1's?); and ptca. Present Symptoms: dysphagia    Pain Intensity 1: 0  Current Medications:   No current facility-administered medications on file prior to encounter. Current Outpatient Prescriptions on File Prior to Encounter   Medication Sig Dispense Refill    naproxen (NAPROSYN) 500 mg tablet TAKE ONE TABLET BY MOUTH ONCE DAILY AS NEEDED 30 Tab 0    metoprolol tartrate (LOPRESSOR) 25 mg tablet Take 0.5 Tabs by mouth every morning. 30 Tab 6    glipiZIDE (GLUCOTROL) 5 mg tablet Take  by mouth two (2) times a day. Prn per pt        sildenafil citrate (VIAGRA) 100 mg tablet Take 1 Tab by mouth as needed. 10 Tab 3    esomeprazole (NEXIUM) 20 mg capsule Take 20 mg by mouth daily.  magnesium 250 mg tab Take 250 mg by mouth daily.  furosemide (LASIX) 40 mg tablet Take 1 Tab by mouth daily. 90 Tab 3    aspirin 81 mg chewable tablet Take 1 Tab by mouth daily.  lisinopril (PRINIVIL, ZESTRIL) 40 mg tablet Take 1 Tab by mouth every morning. 30 Tab 6    nitroglycerin (NITROSTAT) 0.4 mg SL tablet 1 Tab by SubLINGual route every five (5) minutes as needed for Chest Pain. 2 Bottle 4    atorvastatin (LIPITOR) 40 mg tablet Take 40 mg by mouth daily.  tamsulosin (FLOMAX) 0.4 mg capsule Take 0.4 mg by mouth daily.  amlodipine (NORVASC) 5 mg tablet Take 5 mg by mouth every morning.  MULTI-VITAMIN PO Take 1 Tab by mouth every morning.  saw palmetto 160 mg Cap Take 1 Tab by mouth every morning.         zinc 50 mg capsule Take 50 mg by mouth every morning.  ascorbic acid (VITAMIN C) 1,000 mg tablet Take 1,000 mg by mouth every morning.  lutein 6 mg Cap Take 1 Tab by mouth every morning.  GLUCOSAM & CHONDROIT-MV & MIN3 (GLUCOSAMINE &CHONDROIT-MV-MIN3 PO) Take 2 Tabs by mouth every morning.  omega-3 fatty acids 1,000 mg Cap Take 1 Tab by mouth every morning.  arginine 500 mg tablet Take 500 mg by mouth every morning. Current Dietary Status:  NPO                 History of reflux:  NO   · Reflux medication:  Social History/Home Situation: residing with spouse          OBJECTIVE:   Respiratory Status:        CXR Results:   MRI/CT Results: Probable subacute right MCA CVA. Note: If a subtle CVA is suspected,  MRI would be more definitive if clinically warranted. Oral Motor Structure/Speech:  Oral-Motor Structure/Motor Speech  Labial: Left droop  Dentition: Upper dentures, Intact, Natural  Oral Hygiene: adequate  Lingual: Left deviation     Cognitive and Communication Status:  Neurologic State: Alert  Orientation Level: Disoriented to place; Disoriented to situation;Oriented to person;Oriented to time  Cognition: Follows commands              BEDSIDE SWALLOW EVALUATION  Oral Assessment:  Oral Assessment  Labial: Left droop  Dentition: Upper dentures; Intact; Natural  Oral Hygiene: adequate  Lingual: Left deviation  P.O. Trials:  Patient Position: upright in bed     The patient was given teaspoon to straw amounts of the following:   Consistency Presented: Mixed consistency;Puree; Thin liquid; nectar thick liquid  How Presented: Self-fed/presented;SLP-fed/presented;Cup/sip;Spoon;Straw;Successive swallows     ORAL PHASE:  Bolus Acceptance: No impairment  Bolus Formation/Control: Impaired  Propulsion: Delayed (# of seconds)  Type of Impairment: Anterior;Delayed;Lip closure;Mastication  Oral Residue: 10-50% of bolus; Left     PHARYNGEAL PHASE:  Initiation of Swallow: Delayed (# of seconds)  Laryngeal Elevation: Functional  Aspiration Signs/Symptoms: Strong cough  Vocal Quality: No impairment                 OTHER OBSERVATIONS:  Rate/bite size: Impaired   Endurance:  Impaired      Comments: Tool Used: Dysphagia Outcome and Severity Scale (JOSÉ)     Score Comments   Normal Diet  [ ] 7 With no strategies or extra time needed   Functional Swallow  [ ] 6 May have mild oral or pharyngeal delay         Mild Dysphagia     [ ] 5 Which may require one diet consistency restricted (those who demonstrate penetration which is entirely cleared on MBS would be included)   Mild-Moderate Dysphagia  [ ] 4 With 1-2 diet consistencies restricted         Moderate Dysphagia  [ ] 3 With 2 or more diet consistencies restricted         Moderately Severe Dysphagia  [ ] 2 With partial PO strategies (trials with ST only)         Severe Dysphagia  [ ] 1 With inability to tolerate any PO safely            Score:  Initial: 2 Most Recent: X (Date: -- )   Interpretation of Tool: The Dysphagia Outcome and Severity Scale (JOSÉ) is a simple, easy-to-use, 7-point scale developed to systematically rate the functional severity of dysphagia based on objective assessment and make recommendations for diet level, independence level, and type of nutrition.        Score 7 6 5 4 3 2 1   Modifier CH CI CJ CK CL CM CN   · Swallowing:               - CURRENT STATUS:           CM - 80%-99% impaired, limited or restricted               - GOAL STATUS:                   CK - 40%-59% impaired, limited or restricted               - D/C STATUS:                       ---------------To be determined---------------  Payor: [employer] [place] [employer]ICARE / Plan: [facility] MEDICARE / Product Type: Managed Care Medicare /       TREATMENT:               (In addition to Assessment/Re-Assessment sessions the following treatments were rendered)  Assessment/Reassessment only, no treatment provided today  MODALITIES: ORAL MOTOR  EXERCISES:                                                                                                                                                                       LARYNGEAL / PHARYNGEAL EXERCISES:                                                                                                                                      __________________________________________________________________________________________________  Safety:   After treatment position/precautions:  · Upright in Bed  Treatment Assessment:   . Progression/Medical Necessity:   · Skilled intervention continues to be required due to persistent signs and symptoms of aspiration. Compliance with Program/Exercises: Will assess as treatment progresses. Reason for Continuation of Services/Other Comments:  · Patient continues to require skilled intervention due to dysphagia . Recommendations/Intent for next treatment session: \"Treatment next visit will focus on po trials\".      Total Treatment Duration:  Time In: 1329  Time Out: YEHUDA Sandoval, CCC-SLP

## 2017-09-15 ENCOUNTER — APPOINTMENT (OUTPATIENT)
Dept: CT IMAGING | Age: 82
DRG: 065 | End: 2017-09-15
Attending: HOSPITALIST
Payer: MEDICARE

## 2017-09-15 ENCOUNTER — APPOINTMENT (OUTPATIENT)
Dept: MRI IMAGING | Age: 82
DRG: 065 | End: 2017-09-15
Attending: INTERNAL MEDICINE
Payer: MEDICARE

## 2017-09-15 LAB
ANION GAP SERPL CALC-SCNC: 13 MMOL/L (ref 7–16)
BASOPHILS # BLD: 0 K/UL (ref 0–0.2)
BASOPHILS NFR BLD: 0 % (ref 0–2)
BUN SERPL-MCNC: 26 MG/DL (ref 8–23)
CALCIUM SERPL-MCNC: 8.6 MG/DL (ref 8.3–10.4)
CHLORIDE SERPL-SCNC: 108 MMOL/L (ref 98–107)
CHOLEST SERPL-MCNC: 156 MG/DL
CO2 SERPL-SCNC: 20 MMOL/L (ref 21–32)
CREAT SERPL-MCNC: 1.13 MG/DL (ref 0.8–1.5)
DIFFERENTIAL METHOD BLD: ABNORMAL
EOSINOPHIL # BLD: 0 K/UL (ref 0–0.8)
EOSINOPHIL NFR BLD: 0 % (ref 0.5–7.8)
ERYTHROCYTE [DISTWIDTH] IN BLOOD BY AUTOMATED COUNT: 13.6 % (ref 11.9–14.6)
EST. AVERAGE GLUCOSE BLD GHB EST-MCNC: 137 MG/DL
GLUCOSE SERPL-MCNC: 89 MG/DL (ref 65–100)
HBA1C MFR BLD: 6.4 % (ref 4.8–6)
HCT VFR BLD AUTO: 40.1 % (ref 41.1–50.3)
HDLC SERPL-MCNC: 72 MG/DL (ref 40–60)
HDLC SERPL: 2.2 {RATIO}
HGB BLD-MCNC: 14.3 G/DL (ref 13.6–17.2)
IMM GRANULOCYTES # BLD: 0 K/UL (ref 0–0.5)
IMM GRANULOCYTES NFR BLD: 0.2 % (ref 0–5)
LDLC SERPL CALC-MCNC: 62.8 MG/DL
LIPID PROFILE,FLP: ABNORMAL
LYMPHOCYTES # BLD: 0.9 K/UL (ref 0.5–4.6)
LYMPHOCYTES NFR BLD: 10 % (ref 13–44)
MCH RBC QN AUTO: 33.2 PG (ref 26.1–32.9)
MCHC RBC AUTO-ENTMCNC: 35.7 G/DL (ref 31.4–35)
MCV RBC AUTO: 93 FL (ref 79.6–97.8)
MONOCYTES # BLD: 0.6 K/UL (ref 0.1–1.3)
MONOCYTES NFR BLD: 8 % (ref 4–12)
NEUTS SEG # BLD: 6.7 K/UL (ref 1.7–8.2)
NEUTS SEG NFR BLD: 82 % (ref 43–78)
PLATELET # BLD AUTO: 162 K/UL (ref 150–450)
PMV BLD AUTO: 12.5 FL (ref 10.8–14.1)
POTASSIUM SERPL-SCNC: 4 MMOL/L (ref 3.5–5.1)
RBC # BLD AUTO: 4.31 M/UL (ref 4.23–5.67)
SODIUM SERPL-SCNC: 141 MMOL/L (ref 136–145)
TRIGL SERPL-MCNC: 106 MG/DL (ref 35–150)
VLDLC SERPL CALC-MCNC: 21.2 MG/DL (ref 6–23)
WBC # BLD AUTO: 8.2 K/UL (ref 4.3–11.1)

## 2017-09-15 PROCEDURE — 74011250636 HC RX REV CODE- 250/636: Performed by: INTERNAL MEDICINE

## 2017-09-15 PROCEDURE — 85027 COMPLETE CBC AUTOMATED: CPT | Performed by: NURSE PRACTITIONER

## 2017-09-15 PROCEDURE — 74011000258 HC RX REV CODE- 258: Performed by: INTERNAL MEDICINE

## 2017-09-15 PROCEDURE — 70496 CT ANGIOGRAPHY HEAD: CPT

## 2017-09-15 PROCEDURE — 74011000250 HC RX REV CODE- 250: Performed by: INTERNAL MEDICINE

## 2017-09-15 PROCEDURE — 70551 MRI BRAIN STEM W/O DYE: CPT

## 2017-09-15 PROCEDURE — 74011636320 HC RX REV CODE- 636/320: Performed by: INTERNAL MEDICINE

## 2017-09-15 PROCEDURE — 97166 OT EVAL MOD COMPLEX 45 MIN: CPT

## 2017-09-15 PROCEDURE — 80048 BASIC METABOLIC PNL TOTAL CA: CPT | Performed by: INTERNAL MEDICINE

## 2017-09-15 PROCEDURE — C8929 TTE W OR WO FOL WCON,DOPPLER: HCPCS

## 2017-09-15 PROCEDURE — 92526 ORAL FUNCTION THERAPY: CPT

## 2017-09-15 PROCEDURE — 36415 COLL VENOUS BLD VENIPUNCTURE: CPT | Performed by: NURSE PRACTITIONER

## 2017-09-15 PROCEDURE — 65660000000 HC RM CCU STEPDOWN

## 2017-09-15 PROCEDURE — 83036 HEMOGLOBIN GLYCOSYLATED A1C: CPT | Performed by: NURSE PRACTITIONER

## 2017-09-15 PROCEDURE — 80061 LIPID PANEL: CPT | Performed by: NURSE PRACTITIONER

## 2017-09-15 PROCEDURE — 97162 PT EVAL MOD COMPLEX 30 MIN: CPT

## 2017-09-15 RX ORDER — METOPROLOL TARTRATE 25 MG/1
25 TABLET, FILM COATED ORAL EVERY 12 HOURS
Status: DISCONTINUED | OUTPATIENT
Start: 2017-09-15 | End: 2017-09-19

## 2017-09-15 RX ORDER — AMLODIPINE BESYLATE 5 MG/1
5 TABLET ORAL DAILY
Status: DISCONTINUED | OUTPATIENT
Start: 2017-09-16 | End: 2017-09-18

## 2017-09-15 RX ORDER — METOPROLOL TARTRATE 25 MG/1
25 TABLET, FILM COATED ORAL EVERY 12 HOURS
Status: DISCONTINUED | OUTPATIENT
Start: 2017-09-15 | End: 2017-09-15

## 2017-09-15 RX ORDER — SODIUM CHLORIDE 0.9 % (FLUSH) 0.9 %
10 SYRINGE (ML) INJECTION
Status: COMPLETED | OUTPATIENT
Start: 2017-09-15 | End: 2017-09-15

## 2017-09-15 RX ORDER — CLOPIDOGREL BISULFATE 75 MG/1
75 TABLET ORAL DAILY
Status: DISCONTINUED | OUTPATIENT
Start: 2017-09-16 | End: 2017-09-22 | Stop reason: HOSPADM

## 2017-09-15 RX ADMIN — Medication 5 ML: at 17:00

## 2017-09-15 RX ADMIN — ENOXAPARIN SODIUM 40 MG: 40 INJECTION SUBCUTANEOUS at 17:00

## 2017-09-15 RX ADMIN — SODIUM CHLORIDE 100 ML: 900 INJECTION, SOLUTION INTRAVENOUS at 07:59

## 2017-09-15 RX ADMIN — SODIUM CHLORIDE 75 ML/HR: 900 INJECTION, SOLUTION INTRAVENOUS at 06:34

## 2017-09-15 RX ADMIN — PERFLUTREN 1 ML: 6.52 INJECTION, SUSPENSION INTRAVENOUS at 11:41

## 2017-09-15 RX ADMIN — IOPAMIDOL 80 ML: 755 INJECTION, SOLUTION INTRAVENOUS at 07:58

## 2017-09-15 RX ADMIN — Medication 10 ML: at 07:59

## 2017-09-15 NOTE — PROGRESS NOTES
Problem: Interdisciplinary Rounds  Goal: Interdisciplinary Rounds  Outcome: Progressing Towards Goal  Interdisciplinary team rounds were held 9/15/2017 with the following team members:Care Management, Physical Therapy, Physician and . Anticipate discharge to [facility] on Tuesday. Plan of care discussed. See clinical pathway and/or care plan for interventions and desired outcomes.

## 2017-09-15 NOTE — PROGRESS NOTES
Problem: Self Care Deficits Care Plan (Adult)  Goal: *Acute Goals and Plan of Care (Insert Text)  1. Patient will feed self entire meal with set up assistance and adaptive utensils as needed. 2. Patient will complete full body dressing and bathing with minimal assistance and adaptive equipment as needed. 3. Patient will participate in BUE therapeutic exercises to increase strength in LUE to at least 3/5 for participation in ADLs and functional transfers. 4. Patient will participate in [address] [place] [address] therapeutic activities to increase coordination in LUE to [facility] for bimanual fine motor ADLs. 5. Patient will attend to L side for 100% of treatment session with no verbal cues from therapist.   6. Patient will complete functional transfers with minimal assistance and adaptive equipment as needed. Timeframe: 7 visits     Comments:       OCCUPATIONAL THERAPY: Initial Assessment and AM 9/15/2017  INPATIENT: Hospital Day: 2  Payor: [facility] [place] MEDICARE / Plan: [place] [employer] [facility] MEDICARE / Product Type: Managed Care Medicare /      NAME/AGE/GENDER: Oneil Trejo is a 80 y.o. male     PRIMARY DIAGNOSIS:  Stroke ([address] Stroke ([address] Stroke ([address]        ICD-10: Treatment Diagnosis:        · Generalized Muscle Weakness (M62.81)  · Other lack of cordination (R27.8)  · Hemiplegia and hemiparesis following cerebral infarction affecting left non-dominant side (K36.003)   Precautions/Allergies:         Review of patient's allergies indicates no known allergies. ASSESSMENT:      Mr. Reilly Bowman presents to hospital for above. Pt lives with his wife in a [facility], there is 1 RODRIGO. He is typically very active and independent with ADLs/IADLs, including driving. He does not use any AD/DME for functional mobility and reports 0 falls in the last year. Today, pt is supine in bed upon arrival. He is AOX4 and agreeable to OT evaluation.  Per BUE screen, he demonstrates grossly decreased AROM, strength, coordination, tone, sensation (C4-T1) in EDVIN; PROM is [facility] [place] although tone is intermittently abnormal. Pt completes rolling and supine to sit with mod A and demonstrates fair static and poor dynamic sitting balance at EOB. He presents with strong left sided lean and decreased midline orientation. Pt was left seated at EOB with PT. Mr. Padma Aj is functioning well below his baseline and requires continued skilled OT services to maximize safety and independence with ADLs. Will follow during acute stay. This section established at most recent assessment   PROBLEM LIST (Impairments causing functional limitations):  1. Decreased Strength  2. Decreased ADL/Functional Activities  3. Decreased Transfer Abilities  4. Decreased Ambulation Ability/Technique  5. Decreased Balance  6. Increased Pain  7. Decreased Activity Tolerance  8. Decreased Work Simplification/Energy Conservation Techniques  9. Decreased Knowledge of Precautions    INTERVENTIONS PLANNED: (Benefits and precautions of occupational therapy have been discussed with the patient.)  1. Activities of daily living training  2. Adaptive equipment training  3. Balance training  4. Clothing management  5. Donning&doffing training  6. Group therapy  7. Nacho tech training  8. Hygiene training  9. Neuromuscular re-eduation  10. Sensory reintegration training  11. Therapeutic activity  12. Therapeutic exercise      TREATMENT PLAN: Frequency/Duration: Follow patient 4x/week to address above goals. Rehabilitation Potential For Stated Goals: GOOD      RECOMMENDED REHABILITATION/EQUIPMENT: (at time of discharge pending progress): Due to the probability of continued deficits (see above) this patient will likely need continued skilled occupational therapy after discharge.   Equipment:   · Hygiene Aides, Type: Tub Seat/Chair                      OCCUPATIONAL PROFILE AND HISTORY:   History of Present Injury/Illness (Reason for Referral):  See H&P  Past Medical History/Comorbidities:   Mr. Padma Aj  has a past medical history of Arrhythmia; Arthritis; CAD (coronary artery disease) (12/1999); Carotid artery stenosis without cerebral infarction (1/18/2016); Chronic pain; Claudication ([facility] Coronary atherosclerosis of native coronary artery (1/18/2016); GERD (gastroesophageal reflux disease); Hyperlipidemia; Hypertension; Kidney stones; MI (myocardial infarction) ([facility] (12/1999); PAF (paroxysmal atrial fibrillation) ([facility] and [facility] Mr. Umer Morris  has a past surgical history that includes cardiac surg procedure unlist (12/1999); abdomen surgery proc unlisted (2000?); appendectomy (age 15); heent (1's?); and ptca. Social History/Living Environment:   Home Environment: Private residence  # Steps to Enter: 1  One/Two Story Residence: One story  Living Alone: No  Support Systems: Spouse/Significant Other/Partner  Patient Expects to be Discharged to[de-identified] Unknown  Current DME Used/Available at Home: None  Tub or Shower Type: Tub/Shower combination  Prior Level of Function/Work/Activity:  Independent with ADLs  Personal Factors:          Sex:  male        Age:  80 y.o. Number of Personal Factors/Comorbidities that affect the Plan of Care: Expanded review of therapy/medical records (1-2):  MODERATE COMPLEXITY   ASSESSMENT OF OCCUPATIONAL PERFORMANCE[de-identified]   Activities of Daily Living:           Basic ADLs (From Assessment) Complex ADLs (From Assessment)   Basic ADL  Feeding: Minimum assistance  Oral Facial Hygiene/Grooming: Moderate assistance  Bathing: Maximum assistance  Upper Body Dressing: Moderate assistance  Lower Body Dressing: Total assistance  Toileting: Total assistance Instrumental ADL  Meal Preparation: Total assistance  Homemaking: Total assistance  Medication Management: Total assistance  Financial Management: Total assistance   Grooming/Bathing/Dressing Activities of Daily Living     Cognitive Retraining  Safety/Judgement: Decreased insight into deficits; Fall prevention;Home safety                       Bed/Mat Mobility  Rolling: Moderate assistance  Supine to Sit: Moderate assistance          Most Recent Physical Functioning:   Gross Assessment:                  Posture:     Balance:  Sitting: Impaired  Sitting - Static: Fair (occasional)  Sitting - Dynamic: Poor (constant support) Bed Mobility:  Rolling: Moderate assistance  Supine to Sit: Moderate assistance  Wheelchair Mobility:     Transfers:                       Patient Vitals for the past 6 hrs:       BP BP Patient Position SpO2 Pulse   09/15/17 0541 187/83 At rest 94 % 63   09/15/17 0818 182/78 At rest 93 % 65        Mental Status  Neurologic State: Alert, Eyes open spontaneously  Orientation Level: Oriented X4  Cognition: Decreased attention/concentration, Follows commands  Perception: Cues to maintain midline in sitting, Cues to attend to left side of body  Perseveration: No perseveration noted  Safety/Judgement: Decreased insight into deficits, Fall prevention, Home safety                               Physical Skills Involved:  1. Range of Motion  2. Balance  3. Strength  4. Activity Tolerance  5. Sensation  6. Fine Motor Control  7. Gross Motor Control  8. Vision Cognitive Skills Affected (resulting in the inability to perform in a timely and safe manner):  1. Sustained Attention  2. Divided Attention Psychosocial Skills Affected:  1. Habits/Routines  2. Environmental Adaptation   Number of elements that affect the Plan of Care: 5+:  HIGH COMPLEXITY   CLINICAL DECISION MAKIN [facility] [address] [id number] AM-PAC 6 Clicks   Daily Activity Inpatient Short Form  How much help from another person does the patient currently need. .. Total A Lot A Little None   1. Putting on and taking off regular lower body clothing? [X] 1   [ ] 2   [ ] 3   [ ] 4   2. Bathing (including washing, rinsing, drying)? [ ] 1   [X] 2   [ ] 3   [ ] 4   3. Toileting, which includes using toilet, bedpan or urinal?   [X] 1   [ ] 2   [ ] 3   [ ] 4   4.   Putting on and taking off regular upper body clothing?   [ ] 1   [X] 2   [ ] 3   [ ] 4   5. Taking care of personal grooming such as brushing teeth? [ ] 1   [X] 2   [ ] 3   [ ] 4   6. Eating meals? [ ] 1   [ ] 2   [X] 3   [ ] 4   © 2007, Trustees of [address] [place] [address] [address] [id number], under license to [employer]. All rights reserved    Score:  Initial: 11 Most Recent: X (Date: -- )     Interpretation of Tool:  Represents activities that are increasingly more difficult (i.e. Bed mobility, Transfers, Gait). Score 24 23 22-20 19-15 14-10 9-7 6       Modifier CH CI CJ CK CL CM CN         · Self Care:               - CURRENT STATUS:    CL - 60%-79% impaired, limited or restricted               - GOAL STATUS:           CK - 40%-59% impaired, limited or restricted               - D/C STATUS:                       ---------------To be determined---------------  [facility]or: [facility] [place] [facility] / Plan: [place] [facility] / Product Type: [employer] Medicare /       Medical Necessity:     · Patient is expected to demonstrate progress in strength, range of motion, balance, coordination and functional technique to increase independence with ADLs. Reason for Services/Other Comments:  · Patient continues to require skilled intervention due to medical complications.    Use of outcome tool(s) and clinical judgement create a POC that gives a: MODERATE COMPLEXITY             TREATMENT:   (In addition to Assessment/Re-Assessment sessions the following treatments were rendered)      Pre-treatment Symptoms/Complaints:    Pain: Initial:   Pain Intensity 1: 0  Post Session:  same      Assessment/Reassessment only, no treatment provided today     Braces/Orthotics/Lines/Etc:   · O2 Device: Room air  Treatment/Session Assessment:    · Response to Treatment:  eval only  · Interdisciplinary Collaboration:  · Physical Therapist  · Occupational Therapist  · Speech Therapist  · Registered Nurse  · After treatment position/precautions:  · with PT  · Compliance with Program/Exercises: compliant all of the time. · Recommendations/Intent for next treatment session: \"Next visit will focus on advancements to more challenging activities and reduction in assistance provided\".   Total Treatment Duration:  OT Patient Time In/Time Out  Time In: 0950  Time Out: [facility] 17.

## 2017-09-15 NOTE — PROGRESS NOTES
Hospitalist Progress Note    9/15/2017  Admit Date: 2017 12:25 PM   NAME: Mya Tobar   :  1934   MRN:  [id number]   Attending: Nieves Crenshaw MD  PCP:  Duc Lenz MD    SUBJECTIVE:     Mya Tobar is a 80years old male with pmhx of CAD, CABG, paroxysmal A.fib, HTN, dyslipidemia admitted on  with left sided weakness with CT evidence of right MCA infarct. 9/15: seen at bedside  Family present at bedside. Still with persistent weakness in left upper extremity, slurred speech, drooling. Denies chest pain, headache, nausea or vomiting. Review of Systems negative with exception of pertinent positives noted above      PHYSICAL EXAM       Visit Vitals    /83 (BP 1 Location: Right arm, BP Patient Position: At rest)    Pulse 63    Temp 98.7 °F (37.1 °C)    Resp 18    Ht 5' 10\" (1.778 m)    Wt 69.4 kg (153 lb)    SpO2 94%    BMI 21.95 kg/m2      Temp (24hrs), Av.7 °F (37.1 °C), Min:97.8 °F (36.6 °C), Max:99.6 °F (37.6 °C)    Oxygen Therapy  O2 Sat (%): 94 % (09/15/17 0541)  Pulse via Oximetry: 63 beats per minute (09/15/17 0541)  O2 Device: Room air (09/15/17 0541)  No intake or output data in the 24 hours ending 09/15/17 0812       General: No acute distress. HEENT:           Head ATNC, PERRLA+, no pallor or ict, neck suppler, no JVD  Lungs:  CTA Bilaterally. CVS:  Regular rate and rhythm,  No murmur, rub, or gallop, No JVD, No lower   extremity edema. Abdomen: Soft, Non distended, Non tender, Positive bowel sounds. MSK:  No deformities, lesions, Spontaneously moves extremities. Neurologic:  gcs 15, slurred speech+, left facial droop, flaccid left upper EXT, 4/5 LLE, cerebellar functions intact. Strength 5/5 in RUE/RLE  Psychiatry:      No anxiety/Depression  Skin:   No rash/lesions. Good skin turgor  Heme/Lymph/Immune:  No petechiae, ecchymoses, overt signs of bleeding or    lymphadenopathy noted.     Recent Results (from the past 24 hour(s))   EKG, 12 LEAD, INITIAL    Collection Time: 09/14/17 12:50 PM   Result Value Ref Range    Ventricular Rate 72 BPM    Atrial Rate 300 BPM    QRS Duration 98 ms    Q-T Interval 396 ms    QTC Calculation (Bezet) 433 ms    Calculated R Axis 116 degrees    Calculated T Axis 24 degrees    Diagnosis       !! AGE AND GENDER SPECIFIC ECG ANALYSIS !! Atrial fibrillation  Lateral infarct (cited on or before 07-NOV-2015)  Abnormal ECG  When compared with ECG of 07-NOV-2015 20:52,  Atrial fibrillation has replaced Sinus rhythm  ST now depressed in Inferior leads  Confirmed by JAM SOSA (), Christel Lerner ([id number]) on 9/14/2017 5:19:09 PM     GLUCOSE, POC    Collection Time: 09/14/17 12:56 PM   Result Value Ref Range    Glucose (POC) 121 (H) 65 - 100 mg/dL   CBC WITH AUTOMATED DIFF    Collection Time: 09/14/17 12:57 PM   Result Value Ref Range    WBC 8.9 4.3 - 11.1 K/uL    RBC 4.72 4.23 - 5.67 M/uL    HGB 15.6 13.6 - 17.2 g/dL    HCT 43.6 41.1 - 50.3 %    MCV 92.4 79.6 - 97.8 FL    MCH 33.1 (H) 26.1 - 32.9 PG    MCHC 35.8 (H) 31.4 - 35.0 g/dL    RDW 13.3 11.9 - 14.6 %    PLATELET 085 (L) 257 - 450 K/uL    MPV 12.3 10.8 - 14.1 FL    DF AUTOMATED      NEUTROPHILS 86 (H) 43 - 78 %    LYMPHOCYTES 8 (L) 13 - 44 %    MONOCYTES 6 4.0 - 12.0 %    EOSINOPHILS 0 (L) 0.5 - 7.8 %    BASOPHILS 0 0.0 - 2.0 %    IMMATURE GRANULOCYTES 0.1 0.0 - 5.0 %    ABS. NEUTROPHILS 7.7 1.7 - 8.2 K/UL    ABS. LYMPHOCYTES 0.7 0.5 - 4.6 K/UL    ABS. MONOCYTES 0.5 0.1 - 1.3 K/UL    ABS. EOSINOPHILS 0.0 0.0 - 0.8 K/UL    ABS. BASOPHILS 0.0 0.0 - 0.2 K/UL    ABS. IMM.  GRANS. 0.0 0.0 - 0.5 K/UL   METABOLIC PANEL, COMPREHENSIVE    Collection Time: 09/14/17 12:57 PM   Result Value Ref Range    Sodium 140 136 - 145 mmol/L    Potassium 4.3 3.5 - 5.1 mmol/L    Chloride 106 98 - 107 mmol/L    CO2 23 21 - 32 mmol/L    Anion gap 11 7 - 16 mmol/L    Glucose 126 (H) 65 - 100 mg/dL    BUN 38 (H) 8 - 23 MG/DL    Creatinine 1.39 0.8 - 1.5 MG/DL    GFR est AA >60 >60 ml/min/1.73m2    GFR est non-AA 52 (L) >60 ml/min/1.73m2    Calcium 9.1 8.3 - 10.4 MG/DL    Bilirubin, total 0.6 0.2 - 1.1 MG/DL    ALT (SGPT) 32 12 - 65 U/L    AST (SGOT) 37 15 - 37 U/L    Alk. phosphatase 67 50 - 136 U/L    Protein, total 6.9 6.3 - 8.2 g/dL    Albumin 3.4 3.2 - 4.6 g/dL    Globulin 3.5 2.3 - 3.5 g/dL    A-G Ratio 1.0 (L) 1.2 - 3.5     POC TROPONIN-I    Collection Time: 09/14/17  1:00 PM   Result Value Ref Range    Troponin-I (POC) 0.01 (L) 0.02 - 0.05 ng/ml   POC PT/INR    Collection Time: 09/14/17  1:01 PM   Result Value Ref Range    Prothrombin time (POC) 11.4 9.6 - 11.6 SECS    INR (POC) 0.9 0.9 - 1.2     LIPID PANEL    Collection Time: 09/15/17  4:58 AM   Result Value Ref Range    LIPID PROFILE          Cholesterol, total 156 <200 MG/DL    Triglyceride 106 35 - 150 MG/DL    HDL Cholesterol 72 (H) 40 - 60 MG/DL    LDL, calculated 62.8 <100 MG/DL    VLDL, calculated 21.2 6.0 - 23.0 MG/DL    CHOL/HDL Ratio 2.2           Imaging /Procedures /Studies   MRI brain w/o contrast:  IMPRESSION: Acute to early subacute large right MCA territory infarct.  [facility] Problems as of 9/15/2017  Never Reviewed          Codes Class Noted - Resolved POA    * (Principal)Stroke ([address] ICD-10-CM: I63.9  ICD-9-CM: 434.91  9/14/2017 - Present Unknown        CAD (coronary artery disease) (Chronic) ICD-10-CM: I25.10  ICD-9-CM: 414.00  10/6/2015 - Present Yes        PAF (paroxysmal atrial fibrillation) (HCC) (Chronic) ICD-10-CM: I48.0  ICD-9-CM: 427.31  10/6/2015 - Present Yes    Overview Signed 4/26/2017 12:59 PM by MD shree Adams vasc 6             Dyslipidemia (Chronic) ICD-10-CM: E78.5  ICD-9-CM: 272.4  10/6/2015 - Present Yes                  Plan:  - extensive right MCA territory stroke with LUE weakness, slurred speech, drooling. CTA head pending. Will get tele-neuro consult and cardiology eval for Paroxysmal a.fib.   Echo is pending.    - continue start plavix, d/c aspirin, continue lipitor.  - [id number] score of 5, will need OAC, probably 2-3 weeks later. Should follow up with outpatient neuro and cardiology. - continue PT/OT eval  - will need STR on discharge. - DVT prophylaxis with lovenox. - will resume norvasc and lopressor, and continue to hold lisinopril and lasix.     Danny Morillo MD

## 2017-09-15 NOTE — PROGRESS NOTES
Problem: Falls - Risk of  Goal: *Absence of Falls  Document Hussein Fall Risk and appropriate interventions in the flowsheet.    Outcome: Progressing Towards Goal  Fall Risk Interventions:  Mobility Interventions: Assess mobility with egress test           Medication Interventions: Bed/chair exit alarm     Elimination Interventions: Bed/chair exit alarm

## 2017-09-15 NOTE — PROGRESS NOTES
Problem: Mobility Impaired (Adult and Pediatric)  Goal: *Acute Goals and Plan of Care (Insert Text)  STG:  (1.)Mr. Odin Morse will move from supine to sit and sit to supine , scoot up and down and roll side to side with MODERATE ASSIST within 3 day(s). (2.)Mr. Odin Morse will transfer from bed to chair and chair to bed with MAXIMAL ASSIST using the least restrictive device within 3 day(s). (3.)Mr. Odin Morse will ambulate with MAXIMAL ASSIST for 10 feet with the least restrictive device within 3 day(s). (3.)Mr. Odin Morse will participate in therapeutic activity/exerices x 12 minutes for increased strength within 3 days. (5.)Mr. Odin Morse will maintain static/dynamic sitting x 12 minutes with at least CGA for improved balance within 3 days. LTG:  (1.)Mr. Odin Morse will move from supine to sit and sit to supine , scoot up and down and roll side to side in bed with STAND BY ASSIST within 7 day(s). (2.)Mr. Odin Morse will transfer from bed to chair and chair to bed with MINIMAL ASSIST using the least restrictive device within 7 day(s). (3.)Mr. Odin Morse will ambulate with MINIMAL ASSIST for 75 feet with the least restrictive device within 7 day(s). (4.)Mr. Odin Morse will participate in therapeutic activity/exerices x 12 minutes for increased strength within 7 days. (5.)Mr. Odin Morse will maintain static/dynamic sitting x 23 minutes with SUPERVISION for improved balance within 7 days.   ________________________________________________________________________________________________      PHYSICAL THERAPY: INITIAL ASSESSMENT, AM 9/15/2017  INPATIENT: Hospital Day: 2  Payor: [place] OF [place] / [facility] [place] [facility] / Product Type: Managed Care Medicare /      NAME/AGE/GENDER: Geni Riley is a 80 y.o. male     PRIMARY DIAGNOSIS: Stroke ([address] Stroke ([address] [address] [facility]        ICD-10: Treatment Diagnosis:       · Generalized Muscle Weakness (M62.81)  · Difficulty in walking, Not elsewhere classified (R26.2)  · Other abnormalities of gait and mobility (R26.89)   Precaution/Allergies:  Review of patient's allergies indicates no known allergies. ASSESSMENT:      Mr. Jarocho Portillo is a pleasant 80 y.o. male in the hospital for the above who went through majority of treatment with eyes closed. Pt also exhibited decreased attention but was able to follow commands with focus. Pt observed to have L facial droop, slight dysarthria, and increased drooling in upright position. Pt reports that he lives with his wife in a one story house with 1 step to enter and was independent with ADLs/ambulation PTA. Mr. Jarocho Portillo presents to PT with increased tone bilaterally in knee extensors, hip adductors and planarflexors. He also has decreased strength in L LE (grossly 3+/5) but WFL AROM. He also reports intact sensation in L LE to light touch. Pt observed to have fair sitting balance with increased L lean and poor dynamic balance. He also has difficulty tracking L of midline and required head turn. Pt stood with mod assist and poor standing balance. He required max assist x 2 to resume supine position and total assist for scooting. This section established at most recent assessment   PROBLEM LIST (Impairments causing functional limitations):  1. Decreased Strength  2. Decreased ADL/Functional Activities  3. Decreased Transfer Abilities  4. Decreased Ambulation Ability/Technique  5. Decreased Balance  6. Decreased Cognition    INTERVENTIONS PLANNED: (Benefits and precautions of physical therapy have been discussed with the patient.)  1. Balance Exercise  2. Bed Mobility  3. Family Education  4. Gait Training  5. Neuromuscular Re-education/Strengthening  6. Therapeutic Activites  7. Therapeutic Exercise/Strengthening  8. Transfer Training  9.  Group Therapy      TREATMENT PLAN: Frequency/Duration: 3 times a week for duration of hospital stay  Rehabilitation Potential For Stated Goals: GOOD      RECOMMENDED REHABILITATION/EQUIPMENT: (at time of discharge pending progress): Due to the probability of continued deficits (see above) this patient will likely need continued skilled physical therapy after discharge. Equipment:   · None at this time                   HISTORY:   History of Present Injury/Illness (Reason for Referral):  Per H&P:\"  HPI:      Pt is a 79 yo male who presents to ER this afternoon with left facial droop, slurred speech, LUE weakness/numbness noted on awakening a few hours ago. Pt was last seen \"normal\" at 0230 this morning. PMH includes CAD, CABG, paroxysmal A Fib, HTN, HLD. CT head reveals right MCA infarct. Pt given  mg. He does not participate full in NIHSS scale due to drowsiness but neuro exam reveals A&O x 3 although some word salad but eventually answers orientation questions correctly, slurred speech, left facial droop, LUE with flexion to pain but no purposeful movement, dulled sensation LUE, left side neglect, eyes do not cross midline to left. He has already been evaluated by speech who recommends continued NPO for now due to delayed swallowing. He is in A Fib on monitor. BP slightly elevated, has not had any of his typical am meds. Follows with [employer]. Wife at bedside and plan of care discussed thoroughly. \"     Past Medical History/Comorbidities:   Mr. Aleshia Villanueva  has a past medical history of Arrhythmia; Arthritis; CAD (coronary artery disease) (12/1999); Carotid artery stenosis without cerebral infarction (1/18/2016); Chronic pain; Claudication ([address] Coronary atherosclerosis of native coronary artery (1/18/2016); GERD (gastroesophageal reflux disease); Hyperlipidemia; Hypertension; Kidney stones; MI (myocardial infarction) ([address] (12/1999); PAF (paroxysmal atrial fibrillation) ([address] and Thromboembolus ([address]   Mr. Aleshia Villanueva  has a past surgical history that includes cardiac surg procedure unlist (12/1999); abdomen surgery proc unlisted (2000?); appendectomy (age 15); heent (1's?); and ptca.  Social History/Living Environment:   Home Environment: Private residence  # Steps to Enter: 1  One/Two Story Residence: One story  Living Alone: No  Support Systems: Spouse/Significant Other/Partner  Patient Expects to be Discharged to[de-identified] Unknown  Current DME Used/Available at Home: None  Tub or Shower Type: Tub/Shower combination  Prior Level of Function/Work/Activity:  Lives with his wife in a one story house with a step to enter and was independent with ADLs/ambulation PTA      Number of Personal Factors/Comorbidities that affect the Plan of Care:  Chronic pain 1-2: MODERATE COMPLEXITY   EXAMINATION:   Most Recent Physical Functioning:   Gross Assessment:  AROM: Within functional limits  PROM: Within functional limits  Strength: Generally decreased, functional (L LE)  Tone: Abnormal (increased hip add, knee ext, and plantarflex)  Sensation: Intact               Posture:  Posture (WDL): Exceptions to WDL  Posture Assessment: Asymmetry (comment) (increased L lean)  Balance:  Sitting: Impaired  Sitting - Static: Fair (occasional)  Sitting - Dynamic: Poor (constant support)  Standing: Impaired  Standing - Static: Poor  Standing - Dynamic : Poor Bed Mobility:  Sit to Supine: Maximum assistance;Assist x2  Scooting: Total assistance  Wheelchair Mobility:     Transfers:  Sit to Stand: Moderate assistance;Assist x2  Stand to Sit: Moderate assistance;Assist x2  Gait:             Body Structures Involved:  1. Nerves  2. Voice/Speech  3. Muscles Body Functions Affected:  1. Mental  2. Sensory/Pain  3. Voice and Speech  4. Neuromusculoskeletal  5. Movement Related Activities and Participation Affected:  1. General Tasks and Demands  2. Mobility  3. Self Care  4. Domestic Life  5.  Community, Social and [place]   Number of elements that affect the Plan of Care: 4+: HIGH COMPLEXITY   CLINICAL PRESENTATION:   Presentation: Evolving clinical presentation with changing clinical characteristics: MODERATE COMPLEXITY CLINICAL DECISION MAKIN[address] [place] [id number] AM-PAC 6 Clicks   Basic Mobility Inpatient Short Form  How much difficulty does the patient currently have. .. Unable A Lot A Little None   1. Turning over in bed (including adjusting bedclothes, sheets and blankets)? [ ] 1   [X] 2   [ ] 3   [ ] 4   2. Sitting down on and standing up from a chair with arms ( e.g., wheelchair, bedside commode, etc.)   [ ] 1   [X] 2   [ ] 3   [ ] 4   3. Moving from lying on back to sitting on the side of the bed? [ ] 1   [X] 2   [ ] 3   [ ] 4   How much help from another person does the patient currently need. .. Total A Lot A Little None   4. Moving to and from a bed to a chair (including a wheelchair)? [ ] 1   [X] 2   [ ] 3   [ ] 4   5. Need to walk in hospital room? [ ] 1   [X] 2   [ ] 3   [ ] 4   6. Climbing 3-5 steps with a railing? [ ] 1   [X] 2   [ ] 3   [ ] 4   © 2007, Trustees of [address] [place] [address] [address] [address], under license to [employer]. All rights reserved    Score:  Initial: 12 Most Recent: X (Date: -- )     Interpretation of Tool:  Represents activities that are increasingly more difficult (i.e. Bed mobility, Transfers, Gait). Score 24 23 22-20 19-15 14-10 9-7 6       Modifier CH CI CJ CK CL CM CN         · Mobility - Walking and Moving Around:               - CURRENT STATUS:    CL - 60%-79% impaired, limited or restricted               - GOAL STATUS:           CK - 40%-59% impaired, limited or restricted               - D/C STATUS:                       ---------------To be determined---------------  [facility] [place] [facility] / [place] [facility] [place] [facility] / Product Type: Managed Care Medicare /       Medical Necessity:     · Patient demonstrates good rehab potential due to higher previous functional level. Reason for Services/Other Comments:  · Patient continues to require skilled intervention due to decreased functional mobility, balance, and ambulation.    Use of outcome tool(s) and clinical judgement create a POC that gives a: Questionable prediction of patient's progress: MODERATE COMPLEXITY                 TREATMENT:   (In addition to Assessment/Re-Assessment sessions the following treatments were rendered)   Pre-treatment Symptoms/Complaints:  Eyes remained shut for most of eval but spontaneously opened later  Pain: Initial:   Pain Intensity 1: 0  Post Session:  0      Assessment/Reassessment only, no treatment provided today     Braces/Orthotics/Lines/Etc:   · O2 Device: Room air  Treatment/Session Assessment:    · Response to Treatment:  Pt tolerated fairly given decreased attention. · Interdisciplinary Collaboration:  · Physical Therapist  · Occupational Therapist  · Registered Nurse  ·   · After treatment position/precautions:  · Supine in bed  · Bed/Chair-wheels locked  · Bed in low position  · Call light within reach  · Family at bedside  · Compliance with Program/Exercises: Will assess as treatment progresses. · Recommendations/Intent for next treatment session: \"Next visit will focus on advancements to more challenging activities and reduction in assistance provided\".   Total Treatment Duration:  PT Patient Time In/Time Out  Time In: 1001  Time Out: [address] PT, DPT

## 2017-09-15 NOTE — DISCHARGE INSTRUCTIONS

## 2017-09-15 NOTE — PROGRESS NOTES
Visit by spiritual volunteer Marly Johnson, staff [address], [address] [facility]  /   [email or web address]

## 2017-09-15 NOTE — PROGRESS NOTES
Care Management Interventions  Mode of Transport at Discharge: BLS  Transition of Care Consult (CM Consult): Discharge Planning  Discharge Durable Medical Equipment: No  Physical Therapy Consult: Yes  Occupational Therapy Consult: Yes  Speech Therapy Consult: Yes  Current Support Network: Own Home, Lives with Spouse, Family Lives Nearby  Confirm Follow Up Transport: Family  Plan discussed with Pt/Family/Caregiver: Yes  Freedom of Choice Offered: Yes  Discharge Location  Discharge Placement: Rehab Unit Subacute    Pt is an 81 yo male admitted due to a CVA. Pt would benefit from STR services at discharge. SW met with the pt/spouse to discuss choice of subacute facility. Pt sleeping throughout. From as list of facilities in network with the pt's insurance [facility]) the spouse requested referrals to, in order of preference, [address] and [facility]. Referral submitted to [facility] requesting a bed as soon as insurance approval is received. Awaiting a response. Pt's insurance typically takes 3-5 business days to render a decision. PPD placed 9/14/17. SW following to facilitate pt's transfer to rehab at discharge. 1453: Pt has been accepted for admission to . [facility] for Tuesday, 9/19/17, pending insurance approval.  SW notified pt/family of bed offer and plan and they are in agreement.

## 2017-09-16 PROCEDURE — 74011250636 HC RX REV CODE- 250/636: Performed by: INTERNAL MEDICINE

## 2017-09-16 PROCEDURE — 65660000000 HC RM CCU STEPDOWN

## 2017-09-16 PROCEDURE — 74011250637 HC RX REV CODE- 250/637: Performed by: INTERNAL MEDICINE

## 2017-09-16 PROCEDURE — 74011250637 HC RX REV CODE- 250/637: Performed by: HOSPITALIST

## 2017-09-16 RX ORDER — GUAIFENESIN 100 MG/5ML
81 LIQUID (ML) ORAL DAILY
Status: DISCONTINUED | OUTPATIENT
Start: 2017-09-16 | End: 2017-09-22 | Stop reason: HOSPADM

## 2017-09-16 RX ORDER — ATORVASTATIN CALCIUM 40 MG/1
80 TABLET, FILM COATED ORAL DAILY
Status: DISCONTINUED | OUTPATIENT
Start: 2017-09-16 | End: 2017-09-22 | Stop reason: HOSPADM

## 2017-09-16 RX ORDER — PANTOPRAZOLE SODIUM 40 MG/1
40 TABLET, DELAYED RELEASE ORAL
Status: DISCONTINUED | OUTPATIENT
Start: 2017-09-16 | End: 2017-09-22 | Stop reason: HOSPADM

## 2017-09-16 RX ADMIN — ATORVASTATIN CALCIUM 80 MG: 40 TABLET, FILM COATED ORAL at 09:03

## 2017-09-16 RX ADMIN — METOPROLOL TARTRATE 25 MG: 25 TABLET ORAL at 00:21

## 2017-09-16 RX ADMIN — HYDRALAZINE HYDROCHLORIDE 10 MG: 20 INJECTION INTRAMUSCULAR; INTRAVENOUS at 03:14

## 2017-09-16 RX ADMIN — SODIUM CHLORIDE 75 ML/HR: 900 INJECTION, SOLUTION INTRAVENOUS at 23:56

## 2017-09-16 RX ADMIN — PANTOPRAZOLE SODIUM 40 MG: 40 TABLET, DELAYED RELEASE ORAL at 09:03

## 2017-09-16 RX ADMIN — Medication 10 ML: at 20:20

## 2017-09-16 RX ADMIN — AMLODIPINE BESYLATE 5 MG: 5 TABLET ORAL at 09:02

## 2017-09-16 RX ADMIN — METOPROLOL TARTRATE 25 MG: 25 TABLET ORAL at 09:03

## 2017-09-16 RX ADMIN — Medication 10 ML: at 16:16

## 2017-09-16 RX ADMIN — METOPROLOL TARTRATE 25 MG: 25 TABLET ORAL at 20:19

## 2017-09-16 RX ADMIN — SODIUM CHLORIDE 75 ML/HR: 900 INJECTION, SOLUTION INTRAVENOUS at 03:19

## 2017-09-16 RX ADMIN — CLOPIDOGREL BISULFATE 75 MG: 75 TABLET ORAL at 09:02

## 2017-09-16 RX ADMIN — ASPIRIN 81 MG 81 MG: 81 TABLET ORAL at 09:03

## 2017-09-16 RX ADMIN — ENOXAPARIN SODIUM 40 MG: 40 INJECTION SUBCUTANEOUS at 16:16

## 2017-09-16 RX ADMIN — HYDRALAZINE HYDROCHLORIDE 10 MG: 20 INJECTION INTRAMUSCULAR; INTRAVENOUS at 20:19

## 2017-09-16 NOTE — PROGRESS NOTES
Hospitalist Progress Note    2017  Admit Date: 2017 12:25 PM   NAME: Julián Leblanc   :  1934   MRN:  [id number]   Attending: Kathie Rodarte MD  PCP:  Connie Stallings MD    SUBJECTIVE:     Julián Leblanc is a 80years old male with pmhx of CAD, CABG, paroxysmal A.fib, HTN, dyslipidemia admitted on  with left sided weakness with CT evidence of right MCA infarct. : seen at bedside  Family present at bedside. Pt denies any new complaints  Persistent left upper extremity weakness and left facial droop with drooling. No chest pain, palpitations, SOB, cough, fever, chills. Review of Systems negative with exception of pertinent positives noted above      PHYSICAL EXAM       Visit Vitals    /74 (BP 1 Location: Left arm, BP Patient Position: At rest)    Pulse 68    Temp 99.4 °F (37.4 °C)    Resp 18    Ht 5' 10\" (1.778 m)    Wt 69.4 kg (153 lb)    SpO2 96%    BMI 21.95 kg/m2      Temp (24hrs), Av.9 °F (37.2 °C), Min:98 °F (36.7 °C), Max:99.4 °F (37.4 °C)    Oxygen Therapy  O2 Sat (%): 96 % (17 0718)  Pulse via Oximetry: 63 beats per minute (09/15/17 0541)  O2 Device: Room air (09/15/17 0541)  No intake or output data in the 24 hours ending 17 0744       General: No acute distress. HEENT:           Head ATNC, PERRLA+, no pallor or ict, neck suppler, no JVD  Lungs:  CTA Bilaterally. CVS:  Regular rate and rhythm,  No murmur, rub, or gallop, No JVD, No lower   extremity edema. Abdomen: Soft, Non distended, Non tender, Positive bowel sounds. MSK:  No deformities, lesions, Spontaneously moves extremities. Neurologic:  gcs 15, slurred speech+, left facial droop, flaccid left upper EXT, 4/5 LLE, cerebellar functions intact. Strength 5/5 in RUE/RLE  Psychiatry:      No anxiety/Depression  Skin:   No rash/lesions. Good skin turgor  Heme/Lymph/Immune:  No petechiae, ecchymoses, overt signs of bleeding or    lymphadenopathy noted.     No results found for this or any previous visit (from the past 24 hour(s)). Imaging /Procedures /Studies   MRI brain w/o contrast:  IMPRESSION: Acute to early subacute large right MCA territory infarct.     CTA head and neck:  IMPRESSION: Diffuse arterial disease with 50% right ICA NASCET stenosis and 99%  NASCET stenosis at the left ICA. Stenosis of both vertebral artery origins also  is demonstrated.     No significant intracranial arterial pathology. ASSESSMENT      Hospital Problems as of 9/16/2017  Never Reviewed          Codes Class Noted - Resolved POA    * (Principal)Stroke ([facility] [address] ICD-10-CM: I63.9  ICD-9-CM: 434.91  9/14/2017 - Present Unknown        CAD (coronary artery disease) (Chronic) ICD-10-CM: I25.10  ICD-9-CM: 414.00  10/6/2015 - Present Yes        PAF (paroxysmal atrial fibrillation) (HCC) (Chronic) ICD-10-CM: I48.0  ICD-9-CM: 427.31  10/6/2015 - Present Yes    Overview Signed 4/26/2017 12:59 PM by MD shree Dominguez vas 6             Dyslipidemia (Chronic) ICD-10-CM: E78.5  ICD-9-CM: 272.4  10/6/2015 - Present Yes                  Plan:  - extensive right MCA territory stroke with LUE flaccidity, slurred speech, drooling. CTA head showed 50-69% stenosis of ORTIZ and >75% stenosis of LICA. Vascular surgery recommended antiplatelet therapy for now, no urgent surgical intervention. Follow up with vascular surgery clinic in 1 month. Echo showed normal LVEF 55-60% with mildly dilated left atrium, no right to left shunt. Tele neuro agreed with medical management, recommend [address] within 1-2 weeks. - continue start plavix and aspirin, continue lipitor.  - [id number] score of 5, will need OAC, probably 1-2 weeks later. Should follow up with outpatient neuro and cardiology. - continue PT/OT eval  - DVT prophylaxis with lovenox. - will resume norvasc and lopressor, and continue to hold lisinopril and lasix. Disposition: STR on Tuesday next week.     Meaghan Marcano MD

## 2017-09-16 NOTE — PROGRESS NOTES
Remote Tele called to report HR 40. Patient laying in bed alert and oriented. Asymptomatic. VSS /77, HR 65. Will continue to monitor.

## 2017-09-17 PROCEDURE — 74011250636 HC RX REV CODE- 250/636: Performed by: HOSPITALIST

## 2017-09-17 PROCEDURE — 74011250636 HC RX REV CODE- 250/636: Performed by: INTERNAL MEDICINE

## 2017-09-17 PROCEDURE — 74011000258 HC RX REV CODE- 258: Performed by: HOSPITALIST

## 2017-09-17 PROCEDURE — 74011250637 HC RX REV CODE- 250/637: Performed by: INTERNAL MEDICINE

## 2017-09-17 PROCEDURE — 65660000000 HC RM CCU STEPDOWN

## 2017-09-17 PROCEDURE — 74011250637 HC RX REV CODE- 250/637: Performed by: HOSPITALIST

## 2017-09-17 RX ORDER — LISINOPRIL 20 MG/1
40 TABLET ORAL DAILY
Status: DISCONTINUED | OUTPATIENT
Start: 2017-09-17 | End: 2017-09-22 | Stop reason: HOSPADM

## 2017-09-17 RX ORDER — CYCLOBENZAPRINE HCL 10 MG
10 TABLET ORAL
Status: DISCONTINUED | OUTPATIENT
Start: 2017-09-17 | End: 2017-09-22 | Stop reason: HOSPADM

## 2017-09-17 RX ADMIN — ASPIRIN 81 MG 81 MG: 81 TABLET ORAL at 09:12

## 2017-09-17 RX ADMIN — CLOPIDOGREL BISULFATE 75 MG: 75 TABLET ORAL at 09:12

## 2017-09-17 RX ADMIN — SODIUM CHLORIDE 1.5 G: 900 INJECTION, SOLUTION INTRAVENOUS at 21:30

## 2017-09-17 RX ADMIN — HYDRALAZINE HYDROCHLORIDE 10 MG: 20 INJECTION INTRAMUSCULAR; INTRAVENOUS at 23:36

## 2017-09-17 RX ADMIN — AMLODIPINE BESYLATE 5 MG: 5 TABLET ORAL at 09:12

## 2017-09-17 RX ADMIN — ACETAMINOPHEN 650 MG: 650 SUPPOSITORY RECTAL at 03:51

## 2017-09-17 RX ADMIN — HYDRALAZINE HYDROCHLORIDE 10 MG: 20 INJECTION INTRAMUSCULAR; INTRAVENOUS at 03:50

## 2017-09-17 RX ADMIN — HYDRALAZINE HYDROCHLORIDE 10 MG: 20 INJECTION INTRAMUSCULAR; INTRAVENOUS at 09:12

## 2017-09-17 RX ADMIN — ENOXAPARIN SODIUM 40 MG: 40 INJECTION SUBCUTANEOUS at 14:27

## 2017-09-17 RX ADMIN — METOPROLOL TARTRATE 25 MG: 25 TABLET ORAL at 09:12

## 2017-09-17 RX ADMIN — Medication 5 ML: at 05:23

## 2017-09-17 RX ADMIN — LISINOPRIL 40 MG: 20 TABLET ORAL at 09:12

## 2017-09-17 RX ADMIN — HYDRALAZINE HYDROCHLORIDE 10 MG: 20 INJECTION INTRAMUSCULAR; INTRAVENOUS at 18:57

## 2017-09-17 RX ADMIN — ACETAMINOPHEN 650 MG: 650 SUPPOSITORY RECTAL at 14:49

## 2017-09-17 RX ADMIN — Medication 5 ML: at 14:28

## 2017-09-17 RX ADMIN — METOPROLOL TARTRATE 25 MG: 25 TABLET ORAL at 21:30

## 2017-09-17 RX ADMIN — ATORVASTATIN CALCIUM 80 MG: 40 TABLET, FILM COATED ORAL at 09:12

## 2017-09-17 NOTE — PROGRESS NOTES
PT NOTE:  Treatment for Mr. Sherlyn Rincon deferred as patient was sleeping soundly. Will return if time permits.   Sepideh Lopez, PTA

## 2017-09-17 NOTE — PROGRESS NOTES
Hospitalist Progress Note    2017  Admit Date: 2017 12:25 PM   NAME: Denise Montes   :  1934   MRN:  [id number]   Attending: Brittanie Carbajal MD  PCP:  Mary Rose MD    SUBJECTIVE:     Denise Montes is a 80years old male with pmhx of CAD, CABG, paroxysmal A.fib, HTN, dyslipidemia admitted on  with left sided weakness with CT evidence of right MCA infarct. : seen at bedside with pt's wife  Persistent left upper extremity weakness and left facial droop with drooling. No chest pain, palpitations, SOB, cough, fever, chills. Review of Systems negative with exception of pertinent positives noted above      PHYSICAL EXAM       Visit Vitals    /69 (BP 1 Location: Left arm, BP Patient Position: Sitting)    Pulse 78    Temp 97.8 °F (36.6 °C)    Resp 20    Ht 5' 10\" (1.778 m)    Wt 69.4 kg (153 lb)    SpO2 95%    BMI 21.95 kg/m2      Temp (24hrs), Av.5 °F (36.9 °C), Min:97.8 °F (36.6 °C), Max:100 °F (37.8 °C)    Oxygen Therapy  O2 Sat (%): 95 % (17 0701)  Pulse via Oximetry: 63 beats per minute (09/15/17 0541)  O2 Device: Room air (09/15/17 0541)    Intake/Output Summary (Last 24 hours) at 17 0725  Last data filed at 17 0358   Gross per 24 hour   Intake             2476 ml   Output                0 ml   Net             2476 ml          General: No acute distress. HEENT:           Head ATNC, PERRLA+, no pallor or ict, neck suppler, no JVD  Lungs:  CTA Bilaterally. CVS:  Regular rate and rhythm,  No murmur, rub, or gallop, No JVD, No lower   extremity edema. Abdomen: Soft, Non distended, Non tender, Positive bowel sounds. MSK:  No deformities, lesions, Spontaneously moves extremities. Neurologic:  gcs 15, slurred speech+, left facial droop, flaccid left upper EXT, 4/5 LLE, cerebellar functions intact. Strength 5/5 in RUE/RLE  Psychiatry:      No anxiety/Depression  Skin:   No rash/lesions.  Good skin turgor  Heme/Lymph/Immune:  No petechiae, ecchymoses, overt signs of bleeding or    lymphadenopathy noted. No results found for this or any previous visit (from the past 24 hour(s)). Imaging /Procedures /Studies   MRI brain w/o contrast:  IMPRESSION: Acute to early subacute large right MCA territory infarct.     CTA head and neck:  IMPRESSION: Diffuse arterial disease with 50% right ICA NASCET stenosis and 99%  NASCET stenosis at the left ICA. Stenosis of both vertebral artery origins also  is demonstrated.     No significant intracranial arterial pathology. ASSESSMENT      Hospital Problems as of 9/17/2017  Never Reviewed          Codes Class Noted - Resolved POA    * (Principal)Stroke ([facility] [address] ICD-10-CM: I63.9  ICD-9-CM: 434.91  9/14/2017 - Present Unknown        CAD (coronary artery disease) (Chronic) ICD-10-CM: I25.10  ICD-9-CM: 414.00  10/6/2015 - Present Yes        PAF (paroxysmal atrial fibrillation) (HCC) (Chronic) ICD-10-CM: I48.0  ICD-9-CM: 427.31  10/6/2015 - Present Yes    Overview Signed 4/26/2017 12:59 PM by Sherri Worrell MD     chads vasc 6             Dyslipidemia (Chronic) ICD-10-CM: E78.5  ICD-9-CM: 272.4  10/6/2015 - Present Yes                  Plan:  - extensive right MCA territory stroke with LUE flaccidity, slurred speech, drooling. CTA head showed 50-69% stenosis of ORTIZ and >69% stenosis of LICA. No urgent surgical intervention. Follow up with vascular surgery clinic in 1 month. Echo showed normal LVEF 55-60% with mildly dilated left atrium, no right to left shunt. Tele neuro agreed with medical management, recommend [address] within 1-2 weeks. - continue start plavix and aspirin, continue lipitor.  - [id number] score of 5, will need OAC, probably 1-2 weeks later. Should follow up with outpatient neuro and cardiology. - continue PT/OT eval  - DVT prophylaxis with lovenox. - will resume norvasc and lopressor, and continue to hold lisinopril and lasix. Disposition: STR on Tuesday next week.     Cam John Edd Peterson MD

## 2017-09-17 NOTE — PROGRESS NOTES
Problem: Falls - Risk of  Goal: *Absence of Falls  Document Hussein Fall Risk and appropriate interventions in the flowsheet.    Outcome: Progressing Towards Goal  Fall Risk Interventions:  Mobility Interventions: Communicate number of staff needed for ambulation/transfer, OT consult for ADLs, PT Consult for mobility concerns, PT Consult for assist device competence     Mentation Interventions: Door open when patient unattended     Medication Interventions: Patient to call before getting OOB, Teach patient to arise slowly     Elimination Interventions: Call light in reach, Patient to call for help with toileting needs, Toileting schedule/hourly rounds

## 2017-09-18 ENCOUNTER — APPOINTMENT (OUTPATIENT)
Dept: ULTRASOUND IMAGING | Age: 82
DRG: 065 | End: 2017-09-18
Attending: HOSPITALIST
Payer: MEDICARE

## 2017-09-18 LAB
ANION GAP SERPL CALC-SCNC: 12 MMOL/L (ref 7–16)
BUN SERPL-MCNC: 21 MG/DL (ref 8–23)
CALCIUM SERPL-MCNC: 8.1 MG/DL (ref 8.3–10.4)
CHLORIDE SERPL-SCNC: 112 MMOL/L (ref 98–107)
CO2 SERPL-SCNC: 19 MMOL/L (ref 21–32)
CREAT SERPL-MCNC: 0.96 MG/DL (ref 0.8–1.5)
ERYTHROCYTE [DISTWIDTH] IN BLOOD BY AUTOMATED COUNT: 13.5 % (ref 11.9–14.6)
GLUCOSE SERPL-MCNC: 111 MG/DL (ref 65–100)
HCT VFR BLD AUTO: 42.6 % (ref 41.1–50.3)
HGB BLD-MCNC: 14.9 G/DL (ref 13.6–17.2)
MCH RBC QN AUTO: 32.9 PG (ref 26.1–32.9)
MCHC RBC AUTO-ENTMCNC: 35 G/DL (ref 31.4–35)
MCV RBC AUTO: 94 FL (ref 79.6–97.8)
PLATELET # BLD AUTO: 153 K/UL (ref 150–450)
PMV BLD AUTO: 11.4 FL (ref 10.8–14.1)
POTASSIUM SERPL-SCNC: 3.9 MMOL/L (ref 3.5–5.1)
RBC # BLD AUTO: 4.53 M/UL (ref 4.23–5.67)
SODIUM SERPL-SCNC: 143 MMOL/L (ref 136–145)
WBC # BLD AUTO: 9.7 K/UL (ref 4.3–11.1)

## 2017-09-18 PROCEDURE — 74011250636 HC RX REV CODE- 250/636: Performed by: HOSPITALIST

## 2017-09-18 PROCEDURE — 74011000258 HC RX REV CODE- 258: Performed by: HOSPITALIST

## 2017-09-18 PROCEDURE — 65660000000 HC RM CCU STEPDOWN

## 2017-09-18 PROCEDURE — 85027 COMPLETE CBC AUTOMATED: CPT | Performed by: HOSPITALIST

## 2017-09-18 PROCEDURE — 86580 TB INTRADERMAL TEST: CPT | Performed by: INTERNAL MEDICINE

## 2017-09-18 PROCEDURE — 74011250637 HC RX REV CODE- 250/637: Performed by: INTERNAL MEDICINE

## 2017-09-18 PROCEDURE — 74011000302 HC RX REV CODE- 302: Performed by: INTERNAL MEDICINE

## 2017-09-18 PROCEDURE — 74011250637 HC RX REV CODE- 250/637: Performed by: HOSPITALIST

## 2017-09-18 PROCEDURE — 93971 EXTREMITY STUDY: CPT

## 2017-09-18 PROCEDURE — 97112 NEUROMUSCULAR REEDUCATION: CPT

## 2017-09-18 PROCEDURE — 74011250636 HC RX REV CODE- 250/636: Performed by: INTERNAL MEDICINE

## 2017-09-18 PROCEDURE — 80048 BASIC METABOLIC PNL TOTAL CA: CPT | Performed by: HOSPITALIST

## 2017-09-18 PROCEDURE — 97110 THERAPEUTIC EXERCISES: CPT

## 2017-09-18 PROCEDURE — 36415 COLL VENOUS BLD VENIPUNCTURE: CPT | Performed by: HOSPITALIST

## 2017-09-18 RX ORDER — FUROSEMIDE 40 MG/1
40 TABLET ORAL DAILY
Status: DISCONTINUED | OUTPATIENT
Start: 2017-09-18 | End: 2017-09-22 | Stop reason: HOSPADM

## 2017-09-18 RX ORDER — AMLODIPINE BESYLATE 10 MG/1
10 TABLET ORAL DAILY
Status: DISCONTINUED | OUTPATIENT
Start: 2017-09-18 | End: 2017-09-22 | Stop reason: HOSPADM

## 2017-09-18 RX ORDER — TAMSULOSIN HYDROCHLORIDE 0.4 MG/1
0.4 CAPSULE ORAL DAILY
Status: DISCONTINUED | OUTPATIENT
Start: 2017-09-18 | End: 2017-09-22 | Stop reason: HOSPADM

## 2017-09-18 RX ADMIN — METOPROLOL TARTRATE 25 MG: 25 TABLET ORAL at 09:04

## 2017-09-18 RX ADMIN — TAMSULOSIN HYDROCHLORIDE 0.4 MG: 0.4 CAPSULE ORAL at 09:04

## 2017-09-18 RX ADMIN — TUBERCULIN PURIFIED PROTEIN DERIVATIVE 5 UNITS: 5 INJECTION INTRADERMAL at 15:02

## 2017-09-18 RX ADMIN — LISINOPRIL 40 MG: 20 TABLET ORAL at 09:04

## 2017-09-18 RX ADMIN — Medication 5 ML: at 21:29

## 2017-09-18 RX ADMIN — PANTOPRAZOLE SODIUM 40 MG: 40 TABLET, DELAYED RELEASE ORAL at 05:52

## 2017-09-18 RX ADMIN — ASPIRIN 81 MG 81 MG: 81 TABLET ORAL at 09:04

## 2017-09-18 RX ADMIN — Medication 10 ML: at 15:02

## 2017-09-18 RX ADMIN — METOPROLOL TARTRATE 25 MG: 25 TABLET ORAL at 21:29

## 2017-09-18 RX ADMIN — SODIUM CHLORIDE 75 ML/HR: 900 INJECTION, SOLUTION INTRAVENOUS at 04:33

## 2017-09-18 RX ADMIN — FUROSEMIDE 40 MG: 40 TABLET ORAL at 09:04

## 2017-09-18 RX ADMIN — ATORVASTATIN CALCIUM 80 MG: 40 TABLET, FILM COATED ORAL at 09:04

## 2017-09-18 RX ADMIN — SODIUM CHLORIDE 1.5 G: 900 INJECTION, SOLUTION INTRAVENOUS at 04:45

## 2017-09-18 RX ADMIN — CLOPIDOGREL BISULFATE 75 MG: 75 TABLET ORAL at 09:04

## 2017-09-18 RX ADMIN — CYCLOBENZAPRINE HYDROCHLORIDE 10 MG: 10 TABLET, FILM COATED ORAL at 09:04

## 2017-09-18 RX ADMIN — ENOXAPARIN SODIUM 40 MG: 40 INJECTION SUBCUTANEOUS at 16:43

## 2017-09-18 RX ADMIN — HYDRALAZINE HYDROCHLORIDE 10 MG: 20 INJECTION INTRAMUSCULAR; INTRAVENOUS at 19:54

## 2017-09-18 RX ADMIN — SODIUM CHLORIDE 75 ML/HR: 900 INJECTION, SOLUTION INTRAVENOUS at 16:53

## 2017-09-18 RX ADMIN — AMLODIPINE BESYLATE 10 MG: 10 TABLET ORAL at 09:04

## 2017-09-18 RX ADMIN — SODIUM CHLORIDE 1.5 G: 900 INJECTION, SOLUTION INTRAVENOUS at 19:54

## 2017-09-18 NOTE — PROGRESS NOTES
Problem: Falls - Risk of  Goal: *Absence of Falls  Document Hussein Fall Risk and appropriate interventions in the flowsheet.    Outcome: Progressing Towards Goal  Fall Risk Interventions:  Mobility Interventions: Bed/chair exit alarm, Communicate number of staff needed for ambulation/transfer, OT consult for ADLs, Patient to call before getting OOB, PT Consult for mobility concerns, PT Consult for assist device competence, Strengthening exercises (ROM-active/passive), Utilize walker, cane, or other assitive device     Mentation Interventions: Adequate sleep, hydration, pain control, Bed/chair exit alarm, Door open when patient unattended, Familiar objects from home, Family/sitter at bedside, More frequent rounding, Toileting rounds, Update white board     Medication Interventions: Assess postural VS orthostatic hypotension, Evaluate medications/consider consulting pharmacy, Teach patient to arise slowly, Patient to call before getting OOB, Bed/chair exit alarm     Elimination Interventions: Bed/chair exit alarm, Elevated toilet seat, Patient to call for help with toileting needs, Toilet paper/wipes in reach, Toileting schedule/hourly rounds, Call light in reach

## 2017-09-18 NOTE — PROGRESS NOTES
Speech Pathology:     SLP attempted Dysphagia treatment however patient was asleep. Wife visibly upset that patient won't wake up to eat. SLP had to remove yogurt from patient's oral cavity that wife had given him this am. At this time, he is not appropriate for p.o trials until mentation has improved and he's able to follow commands. SLP will attempt later this date if schedule permits. Wife advised to not feed patient when he is not alert. She verbalized understanding. LUIS ALBERTO Gannon. Gina Gray

## 2017-09-18 NOTE — PROGRESS NOTES
SPEECH PATHOLOGY NOTE:    Re-attempted patient this PM. Wife at bedside and reports patient received flexeril this morning. He remains lethargic and inappropriate for po intake. Clinician reviewed ST recommendations with wife, including need for increased alertness prior to po intake. She expressed understanding of information provided. ST to continue following patient for diet tolerance and laryngeal exercises.      Veronica Philippe MSP, CCC-SLP, CBIS

## 2017-09-18 NOTE — PROGRESS NOTES
Problem: Self Care Deficits Care Plan (Adult)  Goal: *Acute Goals and Plan of Care (Insert Text)  1. Patient will feed self entire meal with set up assistance and adaptive utensils as needed. 2. Patient will complete full body dressing and bathing with minimal assistance and adaptive equipment as needed. 3. Patient will participate in BUE therapeutic exercises to increase strength in LUE to at least 3/5 for participation in ADLs and functional transfers. 4. Patient will participate in [address] [place] [address] therapeutic activities to increase coordination in [facility] to [facility] for bimanual fine motor ADLs. 5. Patient will attend to L side for 100% of treatment session with no verbal cues from therapist.   6. Patient will complete functional transfers with minimal assistance and adaptive equipment as needed. Timeframe: 7 visits     Comments:       OCCUPATIONAL THERAPY: Daily Note, Treatment Day: 1st and PM    9/18/2017  INPATIENT: Hospital Day: 5  Payor: [facility] [place] MEDICARE / Plan: [place] [facility] / Product Type: [employer] Medicare /      NAME/AGE/GENDER: Jesus Perdue is a 80 y.o. male     PRIMARY DIAGNOSIS:  Stroke ([facility] Stroke ([facility] [address] Stroke ([facility] [address] [facility]        ICD-10: Treatment Diagnosis:        · Generalized Muscle Weakness (M62.81)  · Other lack of cordination (R27.8)  · Hemiplegia and hemiparesis following cerebral infarction affecting left non-dominant side (R15.759)   Precautions/Allergies:         Review of patient's allergies indicates no known allergies. ASSESSMENT:      Mr. Trevor Thapa presents to hospital for above. Pt lives with his wife in a [facility], there is 1 RODRIG[address]. He is typically very active and independent with ADLs/IADLs, including driving. He does not use any AD/DME for functional mobility and reports 0 falls in the last year. 9/18/2017 Pt presents in supine with his wife at his side. Pt's wife attempting to feed pt small bites of yogurt. Pt speaking to therapist, but initially unable to open his eyes. Pt's wife applied drops to pt's eyes and pt eventually able to open. Pt with L visual neglect and able to track to L side with max cues starting in R visual field. Pt initially stated he could not feel sensation in his LUE. Therapist completed NDT activities with pt and pt then states he could feel therapist touching his arm and was able to tell therapist what part of his arm was being touched. Pt tolerated passive ROM and also able to complete integration exercises using nacho technique training. Pt completed strengthening exercises with his right UE. Pt participated with good effort despite lethargy from muscle relaxer he had received earlier. Pt will continue to benefit from OT to increase progression toward above goals . This section established at most recent assessment   PROBLEM LIST (Impairments causing functional limitations):  1. Decreased Strength  2. Decreased ADL/Functional Activities  3. Decreased Transfer Abilities  4. Decreased Ambulation Ability/Technique  5. Decreased Balance  6. Increased Pain  7. Decreased Activity Tolerance  8. Decreased Work Simplification/Energy Conservation Techniques  9. Decreased Knowledge of Precautions    INTERVENTIONS PLANNED: (Benefits and precautions of occupational therapy have been discussed with the patient.)  1. Activities of daily living training  2. Adaptive equipment training  3. Balance training  4. Clothing management  5. Donning&doffing training  6. Group therapy  7. Nacho tech training  8. Hygiene training  9. Neuromuscular re-eduation  10. Sensory reintegration training  11. Therapeutic activity  12. Therapeutic exercise      TREATMENT PLAN: Frequency/Duration: Follow patient 4x/week to address above goals.   Rehabilitation Potential For Stated Goals: GOOD      RECOMMENDED REHABILITATION/EQUIPMENT: (at time of discharge pending progress): Due to the probability of continued deficits (see above) this patient will likely need continued skilled occupational therapy after discharge. Equipment:   · Hygiene Aides, Type: Tub Seat/Chair                      OCCUPATIONAL PROFILE AND HISTORY:   History of Present Injury/Illness (Reason for Referral):  See H&P  Past Medical History/Comorbidities:   Mr. Lyle Chan  has a past medical history of Arrhythmia; Arthritis; CAD (coronary artery disease) (12/1999); Carotid artery stenosis without cerebral infarction (1/18/2016); Chronic pain; Claudication ([facility] [facility] Coronary atherosclerosis of native coronary artery (1/18/2016); GERD (gastroesophageal reflux disease); Hyperlipidemia; Hypertension; Kidney stones; MI (myocardial infarction) ([address] (12/1999); PAF (paroxysmal atrial fibrillation) ([facility] [address] [facility] and Thromboembolus ([facility] [address] Mr. Lyle Chan  has a past surgical history that includes cardiac surg procedure unlist (12/1999); abdomen surgery proc unlisted (2000?); appendectomy (age 15); heent (1's?); and ptca. Social History/Living Environment:   Home Environment: Private residence  # Steps to Enter: 1  One/Two Story Residence: One story  Living Alone: No  Support Systems: Spouse/Significant Other/Partner  Patient Expects to be Discharged to[de-identified] Unknown  Current DME Used/Available at Home: None  Tub or Shower Type: Tub/Shower combination  Prior Level of Function/Work/Activity:  Independent with ADLs  Personal Factors:          Sex:  male        Age:  80 y.o. Number of Personal Factors/Comorbidities that affect the Plan of Care: Expanded review of therapy/medical records (1-2):  MODERATE COMPLEXITY   ASSESSMENT OF OCCUPATIONAL PERFORMANCE[de-identified]   Activities of Daily Living:           Basic ADLs (From Assessment) Complex ADLs (From Assessment)   Basic ADL  Feeding: Minimum assistance  Oral Facial Hygiene/Grooming: Moderate assistance  Bathing: Maximum assistance  Upper Body Dressing: Moderate assistance  Lower Body Dressing: Total assistance  Toileting: Total assistance Instrumental ADL  Meal Preparation: Total assistance  Homemaking:  Total assistance  Medication Management: Total assistance  Financial Management: Total assistance   Grooming/Bathing/Dressing Activities of Daily Living                                        Most Recent Physical Functioning:   Gross Assessment:                  Posture:  Posture (WDL): Exceptions to WDL  Posture Assessment: Asymmetry (comment) (increased L lean)  Balance:    Bed Mobility:     Wheelchair Mobility:     Transfers:                       Patient Vitals for the past 6 hrs:   BP BP Patient Position SpO2 Pulse   17 1157 165/72 At rest 97 % 86   17 1554 161/76 At rest 92 % 80        Mental Status  Neurologic State: Sleeping (muscle relaxer was given earlier )  Orientation Level: Unable to verbalize  Cognition: No command following  Perception: Cues to maintain midline in sitting, Cues to maintain midline in standing, Cues to attend left visual field, Cues to attend to left side of body  Perseveration: No perseveration noted  Safety/Judgement: Decreased awareness of environment, Decreased insight into deficits, Fall prevention                               Physical Skills Involved:  1. Range of Motion  2. Balance  3. Strength  4. Activity Tolerance  5. Sensation  6. Fine Motor Control  7. Gross Motor Control  8. Vision Cognitive Skills Affected (resulting in the inability to perform in a timely and safe manner):  1. Sustained Attention  2. Divided Attention Psychosocial Skills Affected:  1. Habits/Routines  2. Environmental Adaptation   Number of elements that affect the Plan of Care: 5+:  HIGH COMPLEXITY   CLINICAL DECISION MAKIN [facility] [address] [address] AM-PAC 6 Clicks   Daily Activity Inpatient Short Form  How much help from another person does the patient currently need. .. Total A Lot A Little None   1. Putting on and taking off regular lower body clothing? [X] 1   [ ] 2   [ ] 3   [ ] 4   2. Bathing (including washing, rinsing, drying)? [ ] 1   [X] 2   [ ] 3   [ ] 4   3.   Toileting, which includes using toilet, bedpan or urinal?   [X] 1   [ ] 2   [ ] 3   [ ] 4   4. Putting on and taking off regular upper body clothing?   [ ] 1   [X] 2   [ ] 3   [ ] 4   5. Taking care of personal grooming such as brushing teeth? [ ] 1   [X] 2   [ ] 3   [ ] 4   6. Eating meals? [ ] 1   [ ] 2   [X] 3   [ ] 4   © 2007, Trustees of [facility] [employer], under license to [employer]. All rights reserved    Score:  Initial: 11 Most Recent: X (Date: -- )     Interpretation of Tool:  Represents activities that are increasingly more difficult (i.e. Bed mobility, Transfers, Gait). Score 24 23 22-20 19-15 14-10 9-7 6       Modifier CH CI CJ CK CL CM CN         · Self Care:               - CURRENT STATUS:    CL - 60%-79% impaired, limited or restricted               - GOAL STATUS:           CK - 40%-59% impaired, limited or restricted               - D/C STATUS:                       ---------------To be determined---------------  Payor: [facility] [place] [facility] / Plan[place] [facility] [place] / Product Type: Managed Care Medicare /       Medical Necessity:     · Patient is expected to demonstrate progress in strength, range of motion, balance, coordination and functional technique to increase independence with ADLs. Reason for Services/Other Comments:  · Patient continues to require skilled intervention due to medical complications. Use of outcome tool(s) and clinical judgement create a POC that gives a: MODERATE COMPLEXITY             TREATMENT:   (In addition to Assessment/Re-Assessment sessions the following treatments were rendered)      Pre-treatment Symptoms/Complaints:    Pain: Initial:   Pain Intensity 1: 0  Post Session:  same      Neuromuscular Re-education: (15 minutes):  Exercise/activities per grid below to improve kinesthetic sense and proprioception. Required moderate visual, verbal and manual cues to decrease L side neglect. Mali Velasco   Re-Education Training  Re-Education Training: Yes   JOHN Re-Education  Integration Activities: Therapeutic exercises completed using antelmo technique training . Pt able to complete movements with wrist, hand, and elbow with therapist cues. Other Activities: Rubbing and pressure applied to LUE to increase sensation and L side attention. Therapeutic Exercise: (10 minutes):  Exercises completed with his RUE with exercise tubing that was brought in by his wife. to improve mobility and strength. Required minimal visual and verbal cues to promote proper body mechanics. Progressed resistance and range as indicated. Braces/Orthotics/Lines/Etc:   · O2 Device: Room air  Treatment/Session Assessment:    · Response to Treatment:  [address] participation  · Interdisciplinary Collaboration:  · Certified Occupational Therapy Assistant and Registered Nurse  · After treatment position/precautions:  · Supine in bed, Bed/Chair-wheels locked, Bed in low position, RN notified and Family at bedside  · Compliance with Program/Exercises: compliant all of the time. · Recommendations/Intent for next treatment session: \"Next visit will focus on advancements to more challenging activities and reduction in assistance provided\".   Total Treatment Duration:  OT Patient Time In/Time Out  Time In: [address]  Time Out: [facility]

## 2017-09-18 NOTE — PROGRESS NOTES
Hospitalist Progress Note    2017  Admit Date: 2017 12:25 PM   NAME: Oneil Trejo   :  1934   MRN:  [id number]   Attending: Jaquelin Gustafson MD  PCP:  Romulo Mcclendon MD    SUBJECTIVE:     Oneil Trejo is a 80years old male with pmhx of CAD, CABG, paroxysmal A.fib, HTN, dyslipidemia admitted on  with left sided weakness with CT evidence of right MCA infarct. : seen at bedside with pt's wife  Pt still with left upper ext weakness  Started having some sensation in left hand  No chest pain, cough, SOB, fever, chills. Family concerned about some redness on left hand    Review of Systems negative with exception of pertinent positives noted above      PHYSICAL EXAM       Visit Vitals    /80 (BP 1 Location: Left arm, BP Patient Position: Sitting)    Pulse 78    Temp 97.3 °F (36.3 °C)    Resp 20    Ht 5' 10\" (1.778 m)    Wt 69.4 kg (153 lb)    SpO2 97%    BMI 21.95 kg/m2      Temp (24hrs), Av.3 °F (36.8 °C), Min:97.2 °F (36.2 °C), Max:99.9 °F (37.7 °C)    Oxygen Therapy  O2 Sat (%): 97 % (17 2332)  Pulse via Oximetry: 63 beats per minute (09/15/17 0541)  O2 Device: Room air (09/15/17 0541)    Intake/Output Summary (Last 24 hours) at 17 0737  Last data filed at 17 0453   Gross per 24 hour   Intake             1750 ml   Output                0 ml   Net             1750 ml          General: No acute distress. HEENT:           Head ATNC, PERRLA+, no pallor or ict, neck suppler, no JVD  Lungs:  CTA Bilaterally. CVS:  Regular rate and rhythm,  No murmur, rub, or gallop, No JVD, No lower   extremity edema. Abdomen: Soft, Non distended, Non tender, Positive bowel sounds. MSK:  No deformities, lesions, Spontaneously moves extremities. Neurologic:  gcs 15, speech is getting clear, left facial droop, flaccid left upper EXT, 4/5 LLE, cerebellar functions intact. Strength 5/5 in RUE/RLE  Psychiatry:      No anxiety/Depression  Skin:   No rash/lesions. Good skin turgor  Lymphatics:  No LAD    No results found for this or any previous visit (from the past 24 hour(s)). Imaging /Procedures /Studies   MRI brain w/o contrast:  IMPRESSION: Acute to early subacute large right MCA territory infarct.     CTA head and neck:  IMPRESSION: Diffuse arterial disease with 50% right ICA NASCET stenosis and 99%  NASCET stenosis at the left ICA. Stenosis of both vertebral artery origins also  is demonstrated.     No significant intracranial arterial pathology. ASSESSMENT      Hospital Problems as of 9/18/2017  Never Reviewed          Codes Class Noted - Resolved POA    * (Principal)Stroke ([address] ICD-10-CM: I63.9  ICD-9-CM: 434.91  9/14/2017 - Present Unknown        CAD (coronary artery disease) (Chronic) ICD-10-CM: I25.10  ICD-9-CM: 414.00  10/6/2015 - Present Yes        PAF (paroxysmal atrial fibrillation) (HCC) (Chronic) ICD-10-CM: I48.0  ICD-9-CM: 427.31  10/6/2015 - Present Yes    Overview Signed 4/26/2017 12:59 PM by Azael Ramsey MD     chads vasc 6             Dyslipidemia (Chronic) ICD-10-CM: E78.5  ICD-9-CM: 272.4  10/6/2015 - Present Yes                  Plan:    - Left hand redness: resolved, no warmth. No evidence of abscess or cellulitis. Will stop IV antibiotic    - extensive right MCA territory stroke with LUE flaccidity, slurred speech, drooling. CTA head showed 50-69% stenosis of ORTIZ and >22% stenosis of LICA. No urgent surgical intervention. Follow up with vascular surgery clinic in 1 month. Echo showed normal LVEF 55-60% with mildly dilated left atrium, no right to left shunt. Tele neuro agreed with medical management, recommend [address] within 1-2 weeks. - continue start plavix and aspirin, continue lipitor.  - [id number] score of 5, will need OAC, probably 1-2 weeks later. Should follow up with outpatient neuro and cardiology. - continue PT/OT eval  - DVT prophylaxis with lovenox. - will resume norvasc, lisinopril and lopressor.   Disposition: STR on discharge    Zabrina Alvarez MD

## 2017-09-18 NOTE — PROGRESS NOTES
Pt's spouse notified TARIK that they did not want the [place] to go [place] [facility] [address]. [facility] Referral submitted. Awaiting a response. TARIK notified [facility] to cancel their precert. TARIK following. 1046:  Pt has been accepted for admission to [facility] pending insurance approval.  Bed is available Tuesday or Thursday - realistically it will probably be Thursday before insurance approves. TARIK notified pt's spouse of bed offer and plan and she is in agreement.

## 2017-09-18 NOTE — PROGRESS NOTES
Pt MEWS score of 4.  Pt responds to voice and touch; pt blood pressure of 211/92-administered Apresoline will at 2336, will recheck blood pressure in 30 minutes, if no significant change will notify physician

## 2017-09-18 NOTE — MED STUDENT NOTES
Progress Note    Patient: Dorinda Segovia MRN: [id number]  SSN: [id number]    YOB: 1934  Age: 80 y.o. Sex: male      Admit Date: 9/14/2017    LOS: 4 days     Subjective:   As previously documented:  Pankaj Fuentes is a 80years old male with pmhx of CAD, CABG, paroxysmal A.fib, HTN, dyslipidemia admitted on 9/14 with left sided weakness with CT evidence of right MCA infarct. \"    9/18/17-Mr. Robin Neely was in good spirits this morning and noted no problems overnight. He states he has trouble swallowing since his stroke so has not eaten much, but is able to take his meds crushed in applesauce. He denies fever, chills, chest pain, SOB, abdominal pain, or difficulty with BM. He states he has occasional palpitations and has had a cough recently. He denies any other concerns. His wife was present at bedside. Objective:     Vitals:    09/17/17 2332 09/18/17 0058 09/18/17 0249 09/18/17 0750   BP: (!) 211/92 181/76 169/80 170/74   Pulse: 81 (!) 101 78 65   Resp: 20   19   Temp: 98.3 °F (36.8 °C)  97.3 °F (36.3 °C) 98.8 °F (37.1 °C)   SpO2: 97%   96%   Weight:       Height:            Intake and Output:  Current Shift:    Last three shifts: 09/16 1901 - 09/18 0700  In: 4226 [I.V.:4226]  Out: -     Physical Exam:   GENERAL: alert, cooperative, no distress, slowed mentation, appears stated age  LUNG: clear to auscultation bilaterally anteriorly. Posterior exam not completed due to pt factors  HEART: regular rate and rhythm, S1, S2 normal, no murmur, click, rub or gallop  ABDOMEN: soft, non-tender.  Bowel sounds normal. No masses,  no organomegaly  EXTREMITIES: R UE with  strength intact, L UE flaccid paralysis but some sensation intact on dorsal surface of hand, R LE strength intact, L LE notably weaker than R but not flaccid  SKIN:  boggy area of erythema (not raised, not demarcated) on dorsal surface of L hand and wrist,all else normal  NEUROLOGIC: A&O x3, slurred speech, obvious L sided facial droop with drooling, slowed mentation and slow to follow commands, tongue deviates slightly to the R when protruded, possible L sided neglect, Pt believes he is moving his L UE on command, but is actually moving L LE    Lab/Data Review:  Recent Results (from the past 24 hour(s))   CBC W/O DIFF    Collection Time: 09/18/17  7:59 AM   Result Value Ref Range    WBC 9.7 4.3 - 11.1 K/uL    RBC 4.53 4.23 - 5.67 M/uL    HGB 14.9 13.6 - 17.2 g/dL    HCT 42.6 41.1 - 50.3 %    MCV 94.0 79.6 - 97.8 FL    MCH 32.9 26.1 - 32.9 PG    MCHC 35.0 31.4 - 35.0 g/dL    RDW 13.5 11.9 - 14.6 %    PLATELET 321 425 - 839 K/uL    MPV 11.4 10.8 - 84.5 FL   METABOLIC PANEL, BASIC    Collection Time: 09/18/17  7:59 AM   Result Value Ref Range    Sodium 143 136 - 145 mmol/L    Potassium 3.9 3.5 - 5.1 mmol/L    Chloride 112 (H) 98 - 107 mmol/L    CO2 19 (L) 21 - 32 mmol/L    Anion gap 12 7 - 16 mmol/L    Glucose 111 (H) 65 - 100 mg/dL    BUN 21 8 - 23 MG/DL    Creatinine 0.96 0.8 - 1.5 MG/DL    GFR est AA >60 >60 ml/min/1.73m2    GFR est non-AA >60 >60 ml/min/1.73m2    Calcium 8.1 (L) 8.3 - 10.4 MG/DL     Imaging:  DUPLEX UPPER EXT VENOUS LEFT   Final Result   IMPRESSION:  No evidence of DVT in the left arm. CTA HEAD NECK W WO CONT   Final Result   IMPRESSION: Diffuse arterial disease with 50% right ICA NASCET stenosis and 99%   NASCET stenosis at the left ICA. Stenosis of both vertebral artery origins also   is demonstrated. No significant intracranial arterial pathology. MRI BRAIN WO CONT   Final Result   IMPRESSION: Acute to early subacute large right MCA territory infarct. I discussed these findings with the patient's nurse, Elke Lares, today at 08:05. DUPLEX CAROTID BILATERAL   Final Result   IMPRESSION:  Bilateral atherosclerosis formation. 50-69% stenosis of the left internal carotid artery. .         CT HEAD WO CONT   Final Result   IMPRESSION: Probable subacute right MCA CVA.  Note: If a subtle CVA is suspected,   MRI would be more definitive if clinically warranted. XR CHEST PORT   Final Result   IMPRESSION: Status post median sternotomy, cardiomegaly, clear lung fields          Assessment:     Principal Problem:    Stroke ([facility] (9/14/2017)    Active Problems:    CAD (coronary artery disease) (10/6/2015)      PAF (paroxysmal atrial fibrillation) ([facility] [facility] (10/6/2015)      Overview: chads vasc 6      Dyslipidemia (10/6/2015)        Plan:     · Continue ASA/plavix/lipitor as well as norvasc and lopressor. Pt due to follow up with vascular surgery in 1 month  · [id number] score of 5, will need [facility]. Pt due to follow up with neuro and cardio  · Continue to hold lisinopril and lasix  · Dispo pending for STR placement. Wife prefers Manual Brought checking for opening. DVT Prophylaxis: Lovenox    Signed By: Vero Baker     September 18, 2017        *ATTENTION:  This note has been created by a medical student for educational purposes only. Please do not refer to the content of this note for clinical decision-making, billing, or other purposes. Please see attending physicians note to obtain clinical information on this patient. *

## 2017-09-19 LAB
ANION GAP SERPL CALC-SCNC: 12 MMOL/L (ref 7–16)
BUN SERPL-MCNC: 23 MG/DL (ref 8–23)
CALCIUM SERPL-MCNC: 8.3 MG/DL (ref 8.3–10.4)
CHLORIDE SERPL-SCNC: 110 MMOL/L (ref 98–107)
CO2 SERPL-SCNC: 20 MMOL/L (ref 21–32)
CREAT SERPL-MCNC: 1.02 MG/DL (ref 0.8–1.5)
ERYTHROCYTE [DISTWIDTH] IN BLOOD BY AUTOMATED COUNT: 13.5 % (ref 11.9–14.6)
GLUCOSE SERPL-MCNC: 114 MG/DL (ref 65–100)
HCT VFR BLD AUTO: 43.8 % (ref 41.1–50.3)
HGB BLD-MCNC: 15.9 G/DL (ref 13.6–17.2)
MCH RBC QN AUTO: 33.3 PG (ref 26.1–32.9)
MCHC RBC AUTO-ENTMCNC: 36.3 G/DL (ref 31.4–35)
MCV RBC AUTO: 91.6 FL (ref 79.6–97.8)
MM INDURATION POC: 0 MM (ref 0–5)
PLATELET # BLD AUTO: 191 K/UL (ref 150–450)
PMV BLD AUTO: 11.9 FL (ref 10.8–14.1)
POTASSIUM SERPL-SCNC: 3.8 MMOL/L (ref 3.5–5.1)
PPD POC: NORMAL NEGATIVE
RBC # BLD AUTO: 4.78 M/UL (ref 4.23–5.67)
SODIUM SERPL-SCNC: 142 MMOL/L (ref 136–145)
WBC # BLD AUTO: 9.4 K/UL (ref 4.3–11.1)

## 2017-09-19 PROCEDURE — 74011250636 HC RX REV CODE- 250/636: Performed by: HOSPITALIST

## 2017-09-19 PROCEDURE — 36415 COLL VENOUS BLD VENIPUNCTURE: CPT | Performed by: HOSPITALIST

## 2017-09-19 PROCEDURE — 80048 BASIC METABOLIC PNL TOTAL CA: CPT | Performed by: HOSPITALIST

## 2017-09-19 PROCEDURE — 74011250637 HC RX REV CODE- 250/637: Performed by: HOSPITALIST

## 2017-09-19 PROCEDURE — 74011000258 HC RX REV CODE- 258: Performed by: HOSPITALIST

## 2017-09-19 PROCEDURE — 74011250636 HC RX REV CODE- 250/636: Performed by: INTERNAL MEDICINE

## 2017-09-19 PROCEDURE — 97112 NEUROMUSCULAR REEDUCATION: CPT

## 2017-09-19 PROCEDURE — 85027 COMPLETE CBC AUTOMATED: CPT | Performed by: HOSPITALIST

## 2017-09-19 PROCEDURE — 97530 THERAPEUTIC ACTIVITIES: CPT

## 2017-09-19 PROCEDURE — 92526 ORAL FUNCTION THERAPY: CPT

## 2017-09-19 PROCEDURE — 65660000000 HC RM CCU STEPDOWN

## 2017-09-19 RX ORDER — CLONIDINE HYDROCHLORIDE 0.2 MG/1
0.2 TABLET ORAL EVERY 12 HOURS
Status: DISCONTINUED | OUTPATIENT
Start: 2017-09-19 | End: 2017-09-22 | Stop reason: HOSPADM

## 2017-09-19 RX ORDER — METOPROLOL TARTRATE 50 MG/1
50 TABLET ORAL EVERY 12 HOURS
Status: DISCONTINUED | OUTPATIENT
Start: 2017-09-19 | End: 2017-09-22 | Stop reason: HOSPADM

## 2017-09-19 RX ADMIN — CLONIDINE HYDROCHLORIDE 0.2 MG: 0.2 TABLET ORAL at 23:23

## 2017-09-19 RX ADMIN — CLONIDINE HYDROCHLORIDE 0.2 MG: 0.2 TABLET ORAL at 09:04

## 2017-09-19 RX ADMIN — ASPIRIN 81 MG 81 MG: 81 TABLET ORAL at 09:04

## 2017-09-19 RX ADMIN — ATORVASTATIN CALCIUM 80 MG: 40 TABLET, FILM COATED ORAL at 09:04

## 2017-09-19 RX ADMIN — LISINOPRIL 40 MG: 20 TABLET ORAL at 09:03

## 2017-09-19 RX ADMIN — CLOPIDOGREL BISULFATE 75 MG: 75 TABLET ORAL at 09:04

## 2017-09-19 RX ADMIN — METOPROLOL TARTRATE 50 MG: 50 TABLET ORAL at 23:23

## 2017-09-19 RX ADMIN — METOPROLOL TARTRATE 50 MG: 50 TABLET ORAL at 09:04

## 2017-09-19 RX ADMIN — SODIUM CHLORIDE 1.5 G: 900 INJECTION, SOLUTION INTRAVENOUS at 11:51

## 2017-09-19 RX ADMIN — SODIUM CHLORIDE 1.5 G: 900 INJECTION, SOLUTION INTRAVENOUS at 05:31

## 2017-09-19 RX ADMIN — ENOXAPARIN SODIUM 40 MG: 40 INJECTION SUBCUTANEOUS at 16:58

## 2017-09-19 RX ADMIN — FUROSEMIDE 40 MG: 40 TABLET ORAL at 09:03

## 2017-09-19 RX ADMIN — Medication 10 ML: at 05:31

## 2017-09-19 RX ADMIN — TAMSULOSIN HYDROCHLORIDE 0.4 MG: 0.4 CAPSULE ORAL at 09:03

## 2017-09-19 RX ADMIN — PANTOPRAZOLE SODIUM 40 MG: 40 TABLET, DELAYED RELEASE ORAL at 05:31

## 2017-09-19 RX ADMIN — Medication 5 ML: at 23:24

## 2017-09-19 RX ADMIN — AMLODIPINE BESYLATE 10 MG: 10 TABLET ORAL at 09:03

## 2017-09-19 RX ADMIN — SODIUM CHLORIDE 1.5 G: 900 INJECTION, SOLUTION INTRAVENOUS at 22:43

## 2017-09-19 NOTE — PROGRESS NOTES
Hospitalist Progress Note    2017  Admit Date: 2017 12:25 PM   NAME: Mirta Foley   :  1934   MRN:  [id number]   Attending: Vicki Allen MD  PCP:  Jose Beard MD    SUBJECTIVE:     Mirta Foley is a 80years old male with pmhx of CAD, CABG, paroxysmal A.fib, HTN, dyslipidemia admitted on  with left sided weakness with CT evidence of right MCA infarct. : seen at bedside with pt's wife  Speech is getting clear, with regain of sensation in left hand  No chest pain, SOB, coughing, headache, nausea or vomiting. Review of Systems negative with exception of pertinent positives noted above      PHYSICAL EXAM       Visit Vitals    /74 (BP 1 Location: Left arm, BP Patient Position: At rest)    Pulse 85    Temp 99.5 °F (37.5 °C)    Resp 18    Ht 5' 10\" (1.778 m)    Wt 69.4 kg (153 lb)    SpO2 90%    BMI 21.95 kg/m2      Temp (24hrs), Av.3 °F (36.8 °C), Min:97.3 °F (36.3 °C), Max:99.5 °F (37.5 °C)    Oxygen Therapy  O2 Sat (%): 90 % (17 0715)  Pulse via Oximetry: 63 beats per minute (09/15/17 0541)  O2 Device: Room air (09/15/17 0541)    Intake/Output Summary (Last 24 hours) at 17 0728  Last data filed at 17 2141   Gross per 24 hour   Intake              320 ml   Output                1 ml   Net              319 ml          General: No acute distress. HEENT:           Head ATNC, PERRLA+, no pallor or ict, neck suppler, no JVD  Lungs:  CTA Bilaterally. CVS:  Regular rate and rhythm,  No murmur, rub, or gallop, No JVD, No lower   extremity edema. Abdomen: Soft, Non distended, Non tender, Positive bowel sounds. MSK:  No deformities, lesions, Spontaneously moves extremities. Neurologic:  gcs 15, speech clear, left facial droop, flaccid left upper EXT, 4/5 LLE, cerebellar functions intact. Strength 5/5 in RUE/RLE  Psychiatry:      No anxiety/Depression  Skin:   No rash/lesions.  Good skin turgor  Lymphatics:  No LAD    Recent Results (from the past 24 hour(s))   CBC W/O DIFF    Collection Time: 09/18/17  7:59 AM   Result Value Ref Range    WBC 9.7 4.3 - 11.1 K/uL    RBC 4.53 4.23 - 5.67 M/uL    HGB 14.9 13.6 - 17.2 g/dL    HCT 42.6 41.1 - 50.3 %    MCV 94.0 79.6 - 97.8 FL    MCH 32.9 26.1 - 32.9 PG    MCHC 35.0 31.4 - 35.0 g/dL    RDW 13.5 11.9 - 14.6 %    PLATELET 361 913 - 638 K/uL    MPV 11.4 10.8 - 71.8 FL   METABOLIC PANEL, BASIC    Collection Time: 09/18/17  7:59 AM   Result Value Ref Range    Sodium 143 136 - 145 mmol/L    Potassium 3.9 3.5 - 5.1 mmol/L    Chloride 112 (H) 98 - 107 mmol/L    CO2 19 (L) 21 - 32 mmol/L    Anion gap 12 7 - 16 mmol/L    Glucose 111 (H) 65 - 100 mg/dL    BUN 21 8 - 23 MG/DL    Creatinine 0.96 0.8 - 1.5 MG/DL    GFR est AA >60 >60 ml/min/1.73m2    GFR est non-AA >60 >60 ml/min/1.73m2    Calcium 8.1 (L) 8.3 - 10.4 MG/DL         Imaging /Procedures /Studies   MRI brain w/o contrast:  IMPRESSION: Acute to early subacute large right MCA territory infarct.     CTA head and neck:  IMPRESSION: Diffuse arterial disease with 50% right ICA NASCET stenosis and 99%  NASCET stenosis at the left ICA. Stenosis of both vertebral artery origins also  is demonstrated.     No significant intracranial arterial pathology.   ASSESSMENT      Hospital Problems as of 9/19/2017  Never Reviewed          Codes Class Noted - Resolved POA    * (Principal)Stroke ([facility] [address] ICD-10-CM: I63.9  ICD-9-CM: 434.91  9/14/2017 - Present Unknown        CAD (coronary artery disease) (Chronic) ICD-10-CM: I25.10  ICD-9-CM: 414.00  10/6/2015 - Present Yes        PAF (paroxysmal atrial fibrillation) (HCC) (Chronic) ICD-10-CM: I48.0  ICD-9-CM: 427.31  10/6/2015 - Present Yes    Overview Signed 4/26/2017 12:59 PM by Trena Reardon MD     chads vasc 6             Dyslipidemia (Chronic) ICD-10-CM: E78.5  ICD-9-CM: 272.4  10/6/2015 - Present Yes                  Plan:    - extensive right MCA territory stroke with LUE flaccidity:  CTA head showed 50-69% stenosis of ORTIZ and >90% stenosis of LICA. No urgent surgical intervention. Follow up with vascular surgery clinic in 1 month. Echo showed normal LVEF 55-60% with mildly dilated left atrium, no right to left shunt. Tele neuro agreed with medical management, recommend [address] within 1-2 weeks. - continue start plavix and aspirin, continue lipitor.  - [id number] score of 5, will need OAC, probably 1-2 weeks later. Should follow up with outpatient neuro and cardiology. - continue PT/OT eval  - DVT prophylaxis with lovenox. - will resume norvasc, lisinopril and lopressor. Disposition: accepted by [facility]. Will likely be discharged on 9/21.     Luz Loza MD

## 2017-09-19 NOTE — PROGRESS NOTES
Problem: Falls - Risk of  Goal: *Absence of Falls  Document Hussein Fall Risk and appropriate interventions in the flowsheet.    Fall Risk Interventions:  Mobility Interventions: Bed/chair exit alarm, Communicate number of staff needed for ambulation/transfer, Patient to call before getting OOB     Mentation Interventions: Bed/chair exit alarm, Door open when patient unattended, Family/sitter at bedside     Medication Interventions: Bed/chair exit alarm, Patient to call before getting OOB     Elimination Interventions: Call light in reach, Bed/chair exit alarm, Patient to call for help with toileting needs

## 2017-09-19 NOTE — PROGRESS NOTES
Problem: Mobility Impaired (Adult and Pediatric)  Goal: *Acute Goals and Plan of Care (Insert Text)  STG:  (1.)Mr. Yvonne Vick will move from supine to sit and sit to supine , scoot up and down and roll side to side with MODERATE ASSIST within 3 day(s). (2.)Mr. Yvonne Vick will transfer from bed to chair and chair to bed with MAXIMAL ASSIST using the least restrictive device within 3 day(s). (3.)Mr. Yvonne Vick will ambulate with MAXIMAL ASSIST for 10 feet with the least restrictive device within 3 day(s). (3.)Mr. Yvonne Vick will participate in therapeutic activity/exerices x 12 minutes for increased strength within 3 days. (5.)Mr. Yvonne Vick will maintain static/dynamic sitting x 12 minutes with at least CGA for improved balance within 3 days. LTG:  (1.)Mr. Yvonne Vick will move from supine to sit and sit to supine , scoot up and down and roll side to side in bed with STAND BY ASSIST within 7 day(s). (2.)Mr. Yvonne Vick will transfer from bed to chair and chair to bed with MINIMAL ASSIST using the least restrictive device within 7 day(s). (3.)Mr. Yvonne Vick will ambulate with MINIMAL ASSIST for 75 feet with the least restrictive device within 7 day(s). (4.)Mr. Yvonne Vick will participate in therapeutic activity/exerices x 12 minutes for increased strength within 7 days. (5.)Mr. Yvonne Vick will maintain static/dynamic sitting x 23 minutes with SUPERVISION for improved balance within 7 days.   ________________________________________________________________________________________________      PHYSICAL THERAPY: Daily Note, Treatment Day: 1st and PM 9/19/2017  INPATIENT: Hospital Day: 6  Payor: [place] OF [place] / Plan: Keturah [place] OF [place]ARE / Product Type: Managed Care Medicare /      NAME/AGE/GENDER: Piero Ray is a 80 y.o. male     PRIMARY DIAGNOSIS: Stroke ([facility] [address] Stroke ([facility] [address] Stroke [facility])        ICD-10: Treatment Diagnosis:       · Generalized Muscle Weakness (M62.81)  · Difficulty in walking, Not elsewhere classified (R26.2)  · Other abnormalities of gait and mobility (R26.89)   Precaution/Allergies:  Review of patient's allergies indicates no known allergies. ASSESSMENT:      Mr. Jonh Mustafa was supine in bed with family present upon arrival.  He came to sitting on edge of bed with min-mod assist and log roll technique. Pt seems to have slightly improved sitting balance and worked on neuromuscular re-education from EOB targeting posture, weight shifting, and core strength. Pt continues to lean to L. Pt was also able to rotate head and eyes to L of midline but required time and cuing. He stood with max assist and poor balance with increased L lean and decreased L weight bearing. Pt became fatigued and given max assist to get back to bed where he participate some in bed mobility for silvia-care. Pt  very lethargic at end of treatment and kept eyes closed. Pt has progressed slightly in activity tolerance and balance. Family reports using icy/hot and aspercreme on pt's neck and were advised this might not be good given pt's sensation impairments. Will continue POC. This section established at most recent assessment   PROBLEM LIST (Impairments causing functional limitations):  1. Decreased Strength  2. Decreased ADL/Functional Activities  3. Decreased Transfer Abilities  4. Decreased Ambulation Ability/Technique  5. Decreased Balance  6. Decreased Cognition    INTERVENTIONS PLANNED: (Benefits and precautions of physical therapy have been discussed with the patient.)  1. Balance Exercise  2. Bed Mobility  3. Family Education  4. Gait Training  5. Neuromuscular Re-education/Strengthening  6. Therapeutic Activites  7. Therapeutic Exercise/Strengthening  8. Transfer Training  9.  Group Therapy      TREATMENT PLAN: Frequency/Duration: 3 times a week for duration of hospital stay  Rehabilitation Potential For Stated Goals: GOOD      RECOMMENDED REHABILITATION/EQUIPMENT: (at time of discharge pending progress): Due to the probability of continued deficits (see above) this patient will likely need continued skilled physical therapy after discharge. Equipment:   · None at this time                   HISTORY:   History of Present Injury/Illness (Reason for Referral):  Per H&P:\"  HPI:      Pt is a 81 yo male who presents to ER this afternoon with left facial droop, slurred speech, LUE weakness/numbness noted on awakening a few hours ago. Pt was last seen \"normal\" at 0230 this morning. PMH includes CAD, CABG, paroxysmal A Fib, HTN, HLD. CT head reveals right MCA infarct. Pt given  mg. He does not participate full in NIHSS scale due to drowsiness but neuro exam reveals A&O x 3 although some word salad but eventually answers orientation questions correctly, slurred speech, left facial droop, LUE with flexion to pain but no purposeful movement, dulled sensation LUE, left side neglect, eyes do not cross midline to left. He has already been evaluated by speech who recommends continued NPO for now due to delayed swallowing. He is in A Fib on monitor. BP slightly elevated, has not had any of his typical am meds. Follows with [employer]. Wife at bedside and plan of care discussed thoroughly. \"     Past Medical History/Comorbidities:   Mr. Tamiko Enciso  has a past medical history of Arrhythmia; Arthritis; CAD (coronary artery disease) (12/1999); Carotid artery stenosis without cerebral infarction (1/18/2016); Chronic pain; Claudication ([address] Coronary atherosclerosis of native coronary artery (1/18/2016); GERD (gastroesophageal reflux disease); Hyperlipidemia; Hypertension; Kidney stones; MI (myocardial infarction) ([address] (12/1999); PAF (paroxysmal atrial fibrillation) ([address] and Thromboembolus ([address] Mr. Tamiko Enciso  has a past surgical history that includes cardiac surg procedure unlist (12/1999); abdomen surgery proc unlisted (2000?); appendectomy (age 15); heent (1's?); and ptca.   Social History/Living Environment:   Home Environment: Private residence  # Steps to Enter: 1  One/Two Story Residence: One story  Living Alone: No  Support Systems: Spouse/Significant Other/Partner  Patient Expects to be Discharged to[de-identified] Unknown  Current DME Used/Available at Home: None  Tub or Shower Type: Tub/Shower combination  Prior Level of Function/Work/Activity:  Lives with his wife in a one story house with a step to enter and was independent with ADLs/ambulation PTA      Number of Personal Factors/Comorbidities that affect the Plan of Care:  Chronic pain 1-2: MODERATE COMPLEXITY   EXAMINATION:   Most Recent Physical Functioning:   Gross Assessment:  AROM: Within functional limits  PROM: Within functional limits  Strength: Generally decreased, functional (L LE)  Tone: Abnormal (increased hip add, knee ext, and plantarflex)  Sensation: Intact               Posture:  Posture (WDL): Exceptions to WDL  Posture Assessment: Asymmetry (comment) (increased L lean)  Balance:  Sitting: Impaired  Sitting - Static: Prop sitting  Sitting - Dynamic: Fair (occasional)  Standing: Impaired  Standing - Static: Poor  Standing - Dynamic : Poor Bed Mobility:  Rolling: Minimum assistance; Moderate assistance  Supine to Sit: Moderate assistance  Sit to Supine: Maximum assistance  Scooting: Total assistance  Interventions: Manual cues; Verbal cues; Visual cues  Wheelchair Mobility:     Transfers:  Sit to Stand: Maximum assistance  Stand to Sit: Maximum assistance  Gait:             Body Structures Involved:  1. Nerves  2. Voice/Speech  3. Muscles Body Functions Affected:  1. Mental  2. Sensory/Pain  3. Voice and Speech  4. Neuromusculoskeletal  5. Movement Related Activities and Participation Affected:  1. General Tasks and Demands  2. Mobility  3. Self Care  4. Domestic Life  5.  Community, Social and [place]   Number of elements that affect the Plan of Care: 4+: HIGH COMPLEXITY   CLINICAL PRESENTATION:   Presentation: Evolving clinical presentation with changing clinical characteristics: MODERATE COMPLEXITY   CLINICAL DECISION MAKIN[address] [place] [id number] AM-PAC 6 Clicks   Basic Mobility Inpatient Short Form  How much difficulty does the patient currently have. .. Unable A Lot A Little None   1. Turning over in bed (including adjusting bedclothes, sheets and blankets)? [ ] 1   [X] 2   [ ] 3   [ ] 4   2. Sitting down on and standing up from a chair with arms ( e.g., wheelchair, bedside commode, etc.)   [ ] 1   [X] 2   [ ] 3   [ ] 4   3. Moving from lying on back to sitting on the side of the bed? [ ] 1   [X] 2   [ ] 3   [ ] 4   How much help from another person does the patient currently need. .. Total A Lot A Little None   4. Moving to and from a bed to a chair (including a wheelchair)? [ ] 1   [X] 2   [ ] 3   [ ] 4   5. Need to walk in hospital room? [ ] 1   [X] 2   [ ] 3   [ ] 4   6. Climbing 3-5 steps with a railing? [ ] 1   [X] 2   [ ] 3   [ ] 4   © 2007, Trustees of [address] [place] [id number], under license to [employer]. All rights reserved    Score:  Initial: 12 Most Recent: X (Date: -- )     Interpretation of Tool:  Represents activities that are increasingly more difficult (i.e. Bed mobility, Transfers, Gait). Score 24 23 22-20 19-15 14-10 9-7 6       Modifier CH CI CJ CK CL CM CN         · Mobility - Walking and Moving Around:               - CURRENT STATUS:    CL - 60%-79% impaired, limited or restricted               - GOAL STATUS:           CK - 40%-59% impaired, limited or restricted               - D/C STATUS:                       ---------------To be determined---------------  Payor: [employer] [place] / [place] [employer] [place] / Product Type: Managed Care Medicare /       Medical Necessity:     · Patient demonstrates good rehab potential due to higher previous functional level.   Reason for Services/Other Comments:  · Patient continues to require skilled intervention due to decreased functional mobility, balance, and ambulation. Use of outcome tool(s) and clinical judgement create a POC that gives a: Questionable prediction of patient's progress: MODERATE COMPLEXITY                 TREATMENT:   (In addition to Assessment/Re-Assessment sessions the following treatments were rendered)   Pre-treatment Symptoms/Complaints:  See above  Pain: Initial:   Pain Intensity 1:  (didn't give number)  Post Session:  Neck pain      Therapeutic Activity: (    8 minutes): Therapeutic activities including Bed transfers and STS transfers to improve mobility, strength and coordination. Required maximal   to promote static and dynamic balance in standing and promote coordination of bilateral, upper extremity(s), lower extremity(s). Neuromuscular Re-education: ( 23 minutes):  Exercise/activities to improve balance, kinesthetic sense, posture and proprioception. Required moderate visual, verbal, manual and tactile cues to promote static and dynamic balance in sitting and promote motor control of left, lower extremity(s). Braces/Orthotics/Lines/Etc:   · O2 Device: Room air  Treatment/Session Assessment:    · Response to Treatment:  See above  · Interdisciplinary Collaboration:  · Physical Therapist and Certified Nursing Assistant/Patient Care Technician  · After treatment position/precautions:  · Supine in bed, Bed/Chair-wheels locked and CNA at bedside  · Compliance with Program/Exercises: Will assess as treatment progresses. · Recommendations/Intent for next treatment session: \"Next visit will focus on advancements to more challenging activities and reduction in assistance provided\".   Total Treatment Duration:PT Patient Time In/Time Out  Time In: 1528  Time Out: [address], PT, DPT

## 2017-09-19 NOTE — ADT AUTH CERT NOTES
LOC:Acute Adult-Stroke/TIA (9/18/2017) by Kimmie Oconnor        Review Entered Review Status       9/18/2017 In Primary       Details         REVIEW SUMMARY     Patient: Gerri Moralez  Review Number: [id number]  Review Status: In Primary     Condition Specific: Yes     Condition Level Of Care Code: ACUTE  Condition Level Of Care Description: Acute        OUTCOMES  Outcome Type: Primary           REVIEW DETAILS     Service Date: 09/18/2017  Admit Date: 09/14/2017  Product: Pallavi Barrientos Adult  Subset: Stroke/TIA      (Symptom or finding within 24h)         (Excludes PO medications unless noted)          Select Day, One:              [ ] Episode Day 5, One:                  [ ] ACUTE, One:                      [ ] Partial responder, not clinically stable for discharge and requires continued stay, >= One:                          [ ] Functional impairment (new) and rehabilitation <= 2d since initiation, >= One:                          ~--Admin, IQ Admin Admin on 09- 12:00 PM--~                          Hospitalist Progress Note                            Pt still with left upper ext weakness. Started having some sensation in left hand. Family concerned about some redness on left hand. Neurologic:gcs 15, speech is getting clear, left facial droop, flaccid left upper EXT, 4/5 LLE, cerebellar functions intact. Strength 5/5 in RUE/RLE                          T 97.3, /80, P 78, R 20                          , C02 19, GLUC 111, CA 8.1,                               Plan:                              - Left hand redness: resolved, no warmth. No evidence of abscess or cellulitis. Will stop IV antibiotic                              - extensive right MCA territory stroke with LUE flaccidity, slurred speech, drooling. CTA head showed 50-69% stenosis of ORTIZ and >98% stenosis of LICA. No urgent surgical intervention.                           Follow up with vascular surgery clinic in 1 month.                          Echo showed normal LVEF 55-60% with mildly dilated left atrium, no right to left shunt.                               Tele neuro agreed with medical management, recommend [facility] [place] 1-2 weeks.                             - continue start plavix and aspirin, continue lipitor.                          - [id number] score of 5, will need OAC, probably 1-2 weeks later. Should follow up with outpatient neuro and cardiology. - continue PT/OT eval                          - DVT prophylaxis with lovenox. - will resume norvasc, lisinopril and lopressor. Disposition: STR on discharge                              Speech Pathology:                               SLP attempted Dysphagia treatment however patient was asleep. Wife visibly upset that patient won't wake up to eat. SLP had to remove yogurt from patient's oral cavity that wife had given him this am. At this time, he is not appropriate for p.o trials until mentation has improved and he's able to follow commands. SLP will attempt later this date if schedule permits. Wife advised to not feed patient when he is not alert.                                  CM NOTE:  Pt has been accepted for admission to [facility] pending insurance approval.  Bed is available Tuesday or Thursday - realistically it will probably be Thursday before insurance approves. SW notified pt's spouse of bed offer and plan and she is in agreement.                                NORVASC 10 MG PO QD, IV FLS 75 ML/HR, UNASYN IV Q 8 HRS, ASPIRIN PO QD, PLAVIX PO QD, LOVENOX SC Q 24 HRS, LASIX 40 MG PO QD, ZESTRIL PO QD, LOPRESSOR PO Q 12 HRS,                      Version: InterQual® 2016.1  InterQual® and CareEnhance®  © [address] and/or one of its [place]. All Rights Reserved. CPT only © 2015 American Medical Association.   All Rights Reserved.                  LOC:Acute Adult-Stroke/TIA (9/17/2017) by Marielle Mcgee        Review Entered Review Status       9/18/2017 In Primary       Details         REVIEW SUMMARY     Patient: Joey Singh  Review Number: [id number]  Review Status: In Primary     Condition Specific: Yes     Condition Level Of Care Code: ACUTE  Condition Level Of Care Description: Acute        OUTCOMES  Outcome Type: Primary           REVIEW DETAILS     Service Date: 09/17/2017  Admit Date: 09/14/2017  Product: Jossy Casillas Adult  Subset: Stroke/TIA      (Symptom or finding within 24h)         (Excludes PO medications unless noted)          [X] Select Day, One:              [X] Episode Day 4,  One:                  [X] ACUTE, One:                      [X] Partial responder, not clinically stable for discharge and requires continued stay, >= One:                          [X] Functional impairment (new) and rehabilitation <= 2d since initiation, >= One:                              [X] PT evaluation and training                              ~--Admin, IQ Admin Admin on 09- 11:45 AM--~                              Hospitalist Progress Note                                Persistent left upper extremity weakness and left facial droop with drooling. T 97.8, /69, P 78, R 20                              Neurologic:                gcs 15, slurred speech+, left facial droop, flaccid left upper EXT, 4/5 LLE, cerebellar functions intact. Strength 5/5 in RUE/RLE                              Plan:                              - extensive right MCA territory stroke with LUE flaccidity, slurred speech, drooling. CTA head showed 50-69% stenosis of ORTIZ and >09% stenosis of LICA. No urgent surgical intervention. Follow up with vascular surgery clinic in 1 month. Echo showed normal LVEF 55-60% with mildly dilated left atrium, no right to left shunt.                                 Tele neuro agreed with medical management, recommend [address] within 1-2 weeks.                                 - continue start plavix and aspirin, continue lipitor.                              - [id number] score of 5, will need OAC, probably 1-2 weeks later. Should follow up with outpatient neuro and cardiology. - continue PT/OT eval                              - DVT prophylaxis with lovenox. - will resume norvasc and lopressor, and continue to hold lisinopril and lasix.                                 Disposition: STR on Tuesday next week. PT NOTE:  Treatment for Mr. Judi Philippe deferred as patient was sleeping soundly. Will return if time permits. (initial PT eval 9/15/17. No visit 9/16/17)                              UNASYN IV Q 8 HRS, ASPIRIN PO QD, PLAVIX PO QD, LOVENOX SC Q 24 HRS, HYDRALAZINE IV Q 6 HRS PRN X 4 (6X/24 HRS), ZESTRIL PO QD, LOPRESSOR PO Q 12 HRS, NORVASC PO QD                         Version: [employer] 2016.1  Jewel Eastlake and CareEnhance®  © 2016 [employer] and/or one of its subsidiaries. All Rights Reserved. CPT only © 2015 American Medical Association. All Rights Reserved.                  LOC:Acute Adult-Stroke/TIA (9/16/2017) by Karyna Mas Entered Review Status       9/18/2017 In Primary       Details         REVIEW SUMMARY     Patient: Noe Traylor  Review Number: [id number]  Review Status:  In Primary     Condition Specific: Yes     Condition Level Of Care Code: ACUTE  Condition Level Of Care Description: Acute        OUTCOMES  Outcome Type: Primary           REVIEW DETAILS     Service Date: 09/16/2017  Admit Date: 09/14/2017  Product: Mart Sep Adult  Subset: Stroke/TIA      (Symptom or finding within 24h)         (Excludes PO medications unless noted)          [X] Select Day, One:              [X] Episode Day 3, One: [X] ACUTE, One:                      [X] Partial responder, not clinically stable for discharge and requires continued stay, >= One:                          [X] Stroke, Both:                              [X] Finding, One:                                  [X] Non-hemorrhagic stroke, >= One:                                      [X] Aspirin or antiplatelet (includes PO), administered or contraindicated                              [X] Intervention, All:                                  [X] Neurological assessment at least 3x/24h                                  [X] Activity progression                                  ~--Admin, IQ Admin Admin on 09- 11:38 AM--~                                  Hospitalist Progress Note                                        Pt denies any new complaints. Persistent left upper extremity weakness and left facial droop with drooling. T 99.4, /74, P 68, R 18                                      Neurologic: gcs 15, slurred speech+, left facial droop, flaccid left upper EXT, 4/5 LLE, cerebellar functions intact. Strength 5/5 in RUE/RLE                                      Plan:                                  - extensive right MCA territory stroke with LUE flaccidity, slurred speech, drooling. CTA head showed 50-69% stenosis of ORTIZ and >79% stenosis of LICA. Vascular surgery recommended antiplatelet therapy for now, no urgent surgical intervention. Follow up with vascular surgery clinic in 1 month. Echo showed normal LVEF 55-60% with mildly dilated left atrium, no right to left shunt.                                       Tele neuro agreed with medical management, recommend [address] within 1-2 weeks.                                     - continue start plavix and aspirin, continue lipitor.                                  - [id number] score of 5, will need OAC, probably 1-2 weeks later.  Should follow up with outpatient neuro and cardiology. - continue PT/OT eval                                  - DVT prophylaxis with lovenox. - will resume norvasc and lopressor, and continue to hold lisinopril and lasix.                                     Disposition: STR on Tuesday next week.                                      IV FLS 75 ML/HR, ASPIRIN 81 MG PO QD, PLAVIX PO QD, LOVENOX SC Q 24 HRS, HYDRALAZINE IV Q 6 HRS PRN X2, LOPRESSOR PO Q 12 HRS, NEURO VASC CHECKS Q 4 HRS,                                                   [X] DVT prophylaxis or patient ambulatory     Version: InterQual® 2016.1  Laban Distel and CareEnhance®  © [employer] and/or one of its subsidiaries. All Rights Reserved. CPT only © 2015 American Medical Association. All Rights Reserved.

## 2017-09-19 NOTE — PROGRESS NOTES
Problem: Dysphagia (Adult)  Goal: *Acute Goals and Plan of Care (Insert Text)  STG: Pt will consume trials pureed/nectar thick liquids without signs/sx aspiration 100% (goal revised 9/15/17)  STG: Pt will complete oral motor exercises with 80% accuracy. STG: Pt will participate in a ST evaluation x1. LTG: Pt will tolerate least restrictive diet without signs/sx aspiration 100% for safe swallow function. SPEECH LANGUAGE PATHOLOGY: BEDSIDE SWALLOW NOTE: Daily Note 1     NAME/AGE/GENDER: Susanne Soares is a 80 y.o. male  DATE: 9/19/2017  PRIMARY DIAGNOSIS: Stroke [facility])       ICD-10: Treatment Diagnosis: dysphagia, oropharyngeal 13.12  INTERDISCIPLINARY COLLABORATION: Registered Nurse and Physician  PRECAUTIONS/ALLERGIES: Review of patient's allergies indicates no known allergies. ASSESSMENT:   Patient seen for dysphagia therapy. Pt's spouse present and feeding pt breakfast.  Pt with moderate left buccal pocketing with pureed eventually resulting in a delayed cough. Pureed had also mixed with secretions pt didn't swallow thinning it out. SLP removed residue with a swab. Advised wife to alternate solids/liquids and ensure cavity is clear prior to giving him more pureed trials. Also recommended for her to encourage pt to swallow his secretions. verbal understanding expressed. She reported he has a lot of secretions. No signs/sx with nectar thick liquids. Recommend continuing with a pureed/nectar thick liquid diet with use of compensatory strategies to increase swallow safety. Pt initially alert and talking with eyes open intermittently. Pt appeared more drowsy towards the end of the session as he was requiring cues to respond verbally. However, it is difficult to completely assess alertness with pt as he frequently keeps his eyes closed. Will continue to follow. Patient will benefit from skilled intervention to address the below impairments. ?????? ? ? This section established at most recent assessment??????????  PROBLEM LIST (Impairments causing functional limitations):  1. Dysphagia  2. Facial weakness  3. Dysarthria  4. dysfluencies  REHABILITATION POTENTIAL FOR STATED GOALS: GOOD      PLAN OF CARE:   Patient will benefit from skilled intervention to address the following impairments. RECOMMENDATIONS AND PLANNED INTERVENTIONS (Benefits and precautions of therapy have been discussed with the patient.):  · NPO  MEDICATIONS:  · Crushed in puree  COMPENSATORY STRATEGIES/MODIFICATIONS INCLUDING:  · None  OTHER RECOMMENDATIONS (including follow up treatment recommendations):   · Oral motor exercises  · ST evaluation  · Po trials  RECOMMENDED DIET MODIFICATIONS DISCUSSED WITH:  · Hospitalist  · Nursing  · ER MD  · Patient  FREQUENCY/DURATION: Continue to follow patient 3 times a week for duration of hospital stay to address above goals. RECOMMENDED REHABILITATION/EQUIPMENT: (at time of discharge pending progress): Due to the probability of continued deficits (see above) this patient will likely need continued skilled speech therapy after discharge. SUBJECTIVE:   Pt drowsy but cooperative. History of Present Injury/Illness: Mr. Tamiko Enciso  has a past medical history of Arrhythmia; Arthritis; CAD (coronary artery disease) (12/1999); Carotid artery stenosis without cerebral infarction (1/18/2016); Chronic pain; Claudication ([address] Coronary atherosclerosis of native coronary artery (1/18/2016); GERD (gastroesophageal reflux disease); Hyperlipidemia; Hypertension; Kidney stones; MI (myocardial infarction) ([address] (12/1999); PAF (paroxysmal atrial fibrillation) ([address] and Thromboembolus ([address] .   He also  has a past surgical history that includes cardiac surg procedure unlist (12/1999); abdomen surgery proc unlisted (2000?); appendectomy (age 15); heent (1's?); and ptca.     Present Symptoms: dysphagia    Pain Intensity 1: 9  Pain Location 1: Head  Pain Intervention(s) 1: Nurse notified  Current Medications:   No current facility-administered medications on file prior to encounter. Current Outpatient Prescriptions on File Prior to Encounter   Medication Sig Dispense Refill    naproxen (NAPROSYN) 500 mg tablet TAKE ONE TABLET BY MOUTH ONCE DAILY AS NEEDED 30 Tab 0    metoprolol tartrate (LOPRESSOR) 25 mg tablet Take 0.5 Tabs by mouth every morning. 30 Tab 6    esomeprazole (NEXIUM) 20 mg capsule Take 20 mg by mouth daily.  magnesium 250 mg tab Take 250 mg by mouth daily.  furosemide (LASIX) 40 mg tablet Take 1 Tab by mouth daily. 90 Tab 3    aspirin 81 mg chewable tablet Take 1 Tab by mouth daily.  lisinopril (PRINIVIL, ZESTRIL) 40 mg tablet Take 1 Tab by mouth every morning. 30 Tab 6    nitroglycerin (NITROSTAT) 0.4 mg SL tablet 1 Tab by SubLINGual route every five (5) minutes as needed for Chest Pain. 2 Bottle 4    atorvastatin (LIPITOR) 40 mg tablet Take 40 mg by mouth daily.  tamsulosin (FLOMAX) 0.4 mg capsule Take 0.4 mg by mouth daily.  amlodipine (NORVASC) 5 mg tablet Take 5 mg by mouth every morning.  MULTI-VITAMIN PO Take 1 Tab by mouth every morning.  zinc 50 mg capsule Take 50 mg by mouth every morning.  ascorbic acid (VITAMIN C) 1,000 mg tablet Take 1,000 mg by mouth every morning.  lutein 6 mg Cap Take 1 Tab by mouth every morning.  arginine 500 mg tablet Take 500 mg by mouth every morning.  sildenafil citrate (VIAGRA) 100 mg tablet Take 1 Tab by mouth as needed.  10 Tab 3     Current Dietary Status:  NPO                 History of reflux:  NO   · Reflux medication:  Social History/Home Situation: residing with spouse   Home Environment: Private residence  # Steps to Enter: 1  One/Two Story Residence: One story  Living Alone: No  Support Systems: Spouse/Significant Other/Partner  Patient Expects to be Discharged to[de-identified] Unknown  Current DME Used/Available at Home: None  Tub or Shower Type: Tub/Shower combination      OBJECTIVE: Respiratory Status:        CXR Results: n/a  MRI/CT Results: Probable subacute right MCA CVA. Note: If a subtle CVA is suspected,  MRI would be more definitive if clinically warranted. Cognitive and Communication Status:  Neurologic State: Alert;Drowsy                    BEDSIDE SWALLOW EVALUATION  Oral Assessment:     P.O. Trials:  Patient Position: upright in bed     The patient was given teaspoon to straw amounts of the following:   Consistency Presented: Nectar thick liquid;Puree; nectar thick liquid  How Presented: SLP-fed/presented;Spoon;Straw;Successive swallows     ORAL PHASE:  Bolus Acceptance: Impaired  Bolus Formation/Control: Impaired  Propulsion: Delayed (# of seconds)  Type of Impairment: Delayed  Oral Residue: 10-50% of bolus; Left     PHARYNGEAL PHASE:  Initiation of Swallow: No impairment     Aspiration Signs/Symptoms: Delayed cough/throat clear  Vocal Quality: No impairment                 OTHER OBSERVATIONS:  Rate/bite size: Impaired   Endurance:  Impaired      Comments:         Tool Used: Dysphagia Outcome and Severity Scale (JOSÉ)     Score Comments   Normal Diet  [ ] 7 With no strategies or extra time needed   Functional Swallow  [ ] 6 May have mild oral or pharyngeal delay         Mild Dysphagia     [ ] 5 Which may require one diet consistency restricted (those who demonstrate penetration which is entirely cleared on MBS would be included)   Mild-Moderate Dysphagia  [ ] 4 With 1-2 diet consistencies restricted         Moderate Dysphagia  [ ] 3 With 2 or more diet consistencies restricted         Moderately Severe Dysphagia  [ ] 2 With partial PO strategies (trials with ST only)         Severe Dysphagia  [ ] 1 With inability to tolerate any PO safely            Score:  Initial: 2 Most Recent: X (Date: -- )   Interpretation of Tool: The Dysphagia Outcome and Severity Scale (JOSÉ) is a simple, easy-to-use, 7-point scale developed to systematically rate the functional severity of dysphagia based on objective assessment and make recommendations for diet level, independence level, and type of nutrition. Score 7 6 5 4 3 2 1   Modifier CH CI CJ CK CL CM CN   · Swallowing:               - CURRENT STATUS:           CM - 80%-99% impaired, limited or restricted               - GOAL STATUS:                   CK - 40%-59% impaired, limited or restricted               - D/C STATUS:                       ---------------To be determined---------------  Payor: [facility] MEDICARE / Plan: [place] [facility] / Product Type: Managed Care Medicare /       TREATMENT:               (In addition to Assessment/Re-Assessment sessions the following treatments were rendered)  Dysphagia Activities: Activities/Procedures listed utilized to improve progress in swallow strength and swallow function. Required moderate cueing to improve swallow safety. ORAL MOTOR  EXERCISES:                                                                                                                                                                       LARYNGEAL / PHARYNGEAL EXERCISES:                                                                                                                                      __________________________________________________________________________________________________  Safety:   After treatment position/precautions:  · Upright in Bed  Treatment Assessment:   . Progression/Medical Necessity:   · Skilled intervention continues to be required due to persistent signs and symptoms of aspiration. Compliance with Program/Exercises: Will assess as treatment progresses. Reason for Continuation of Services/Other Comments:  · Patient continues to require skilled intervention due to dysphagia . Recommendations/Intent for next treatment session: \"Treatment next visit will focus on diet tolerance\".      Total Treatment Duration:  Time In: 8803  Time Out: [address] [facility] [address] [id number] [facility]

## 2017-09-20 LAB
ANION GAP SERPL CALC-SCNC: 10 MMOL/L (ref 7–16)
BUN SERPL-MCNC: 30 MG/DL (ref 8–23)
CALCIUM SERPL-MCNC: 8.2 MG/DL (ref 8.3–10.4)
CHLORIDE SERPL-SCNC: 113 MMOL/L (ref 98–107)
CO2 SERPL-SCNC: 22 MMOL/L (ref 21–32)
CREAT SERPL-MCNC: 1.09 MG/DL (ref 0.8–1.5)
ERYTHROCYTE [DISTWIDTH] IN BLOOD BY AUTOMATED COUNT: 13.4 % (ref 11.9–14.6)
GLUCOSE SERPL-MCNC: 124 MG/DL (ref 65–100)
HCT VFR BLD AUTO: 41.5 % (ref 41.1–50.3)
HGB BLD-MCNC: 14.6 G/DL (ref 13.6–17.2)
MCH RBC QN AUTO: 32.7 PG (ref 26.1–32.9)
MCHC RBC AUTO-ENTMCNC: 35.2 G/DL (ref 31.4–35)
MCV RBC AUTO: 93 FL (ref 79.6–97.8)
MM INDURATION POC: 0 MM (ref 0–5)
PLATELET # BLD AUTO: 180 K/UL (ref 150–450)
PMV BLD AUTO: 11.6 FL (ref 10.8–14.1)
POTASSIUM SERPL-SCNC: 3.5 MMOL/L (ref 3.5–5.1)
PPD POC: 0 NEGATIVE
RBC # BLD AUTO: 4.46 M/UL (ref 4.23–5.67)
SODIUM SERPL-SCNC: 145 MMOL/L (ref 136–145)
WBC # BLD AUTO: 7.7 K/UL (ref 4.3–11.1)

## 2017-09-20 PROCEDURE — 97530 THERAPEUTIC ACTIVITIES: CPT

## 2017-09-20 PROCEDURE — 97535 SELF CARE MNGMENT TRAINING: CPT

## 2017-09-20 PROCEDURE — 65660000000 HC RM CCU STEPDOWN

## 2017-09-20 PROCEDURE — 74011000258 HC RX REV CODE- 258: Performed by: HOSPITALIST

## 2017-09-20 PROCEDURE — 92526 ORAL FUNCTION THERAPY: CPT

## 2017-09-20 PROCEDURE — 36415 COLL VENOUS BLD VENIPUNCTURE: CPT | Performed by: HOSPITALIST

## 2017-09-20 PROCEDURE — 80048 BASIC METABOLIC PNL TOTAL CA: CPT | Performed by: HOSPITALIST

## 2017-09-20 PROCEDURE — 74011250636 HC RX REV CODE- 250/636: Performed by: HOSPITALIST

## 2017-09-20 PROCEDURE — 85027 COMPLETE CBC AUTOMATED: CPT | Performed by: HOSPITALIST

## 2017-09-20 PROCEDURE — 74011250637 HC RX REV CODE- 250/637: Performed by: HOSPITALIST

## 2017-09-20 PROCEDURE — 97150 GROUP THERAPEUTIC PROCEDURES: CPT

## 2017-09-20 PROCEDURE — 74011250636 HC RX REV CODE- 250/636: Performed by: INTERNAL MEDICINE

## 2017-09-20 RX ADMIN — SODIUM CHLORIDE 1.5 G: 900 INJECTION, SOLUTION INTRAVENOUS at 04:41

## 2017-09-20 RX ADMIN — ASPIRIN 81 MG 81 MG: 81 TABLET ORAL at 10:56

## 2017-09-20 RX ADMIN — Medication 5 ML: at 22:34

## 2017-09-20 RX ADMIN — PANTOPRAZOLE SODIUM 40 MG: 40 TABLET, DELAYED RELEASE ORAL at 05:41

## 2017-09-20 RX ADMIN — ATORVASTATIN CALCIUM 80 MG: 40 TABLET, FILM COATED ORAL at 10:57

## 2017-09-20 RX ADMIN — TAMSULOSIN HYDROCHLORIDE 0.4 MG: 0.4 CAPSULE ORAL at 11:00

## 2017-09-20 RX ADMIN — ENOXAPARIN SODIUM 40 MG: 40 INJECTION SUBCUTANEOUS at 16:00

## 2017-09-20 RX ADMIN — SODIUM CHLORIDE 1.5 G: 900 INJECTION, SOLUTION INTRAVENOUS at 13:07

## 2017-09-20 RX ADMIN — FUROSEMIDE 40 MG: 40 TABLET ORAL at 10:57

## 2017-09-20 RX ADMIN — Medication 5 ML: at 05:41

## 2017-09-20 RX ADMIN — CLONIDINE HYDROCHLORIDE 0.2 MG: 0.2 TABLET ORAL at 10:57

## 2017-09-20 RX ADMIN — LISINOPRIL 40 MG: 20 TABLET ORAL at 10:56

## 2017-09-20 RX ADMIN — CLOPIDOGREL BISULFATE 75 MG: 75 TABLET ORAL at 10:57

## 2017-09-20 RX ADMIN — SODIUM CHLORIDE 75 ML/HR: 900 INJECTION, SOLUTION INTRAVENOUS at 16:28

## 2017-09-20 RX ADMIN — METOPROLOL TARTRATE 50 MG: 50 TABLET ORAL at 10:57

## 2017-09-20 RX ADMIN — METOPROLOL TARTRATE 50 MG: 50 TABLET ORAL at 22:34

## 2017-09-20 RX ADMIN — Medication 10 ML: at 13:08

## 2017-09-20 RX ADMIN — CLONIDINE HYDROCHLORIDE 0.2 MG: 0.2 TABLET ORAL at 22:34

## 2017-09-20 RX ADMIN — AMLODIPINE BESYLATE 10 MG: 10 TABLET ORAL at 10:57

## 2017-09-20 RX ADMIN — SODIUM CHLORIDE 1.5 G: 900 INJECTION, SOLUTION INTRAVENOUS at 21:22

## 2017-09-20 NOTE — PROGRESS NOTES
Hospitalist Progress Note    2017  Admit Date: 2017 12:25 PM   NAME: Elle Klein   :  1934   MRN:  [id number]   Attending: Carey Chao MD  PCP:  Donnell Danielson MD    SUBJECTIVE:     Elle Klein is a 80years old male with pmhx of CAD, CABG, paroxysmal A.fib, HTN, dyslipidemia admitted on  with left sided weakness with CT evidence of right MCA infarct. : seen at bedside with pt's wife  Speech continues to improve, with regain of sensation in left hand  No chest pain, SOB, coughing, headache, nausea or vomiting. No overnight events    Review of Systems negative with exception of pertinent positives noted above      PHYSICAL EXAM       Visit Vitals    /62 (BP 1 Location: Right arm, BP Patient Position: At rest)    Pulse 62    Temp 99.1 °F (37.3 °C)    Resp 20    Ht 5' 10\" (1.778 m)    Wt 69.4 kg (153 lb)    SpO2 95%    BMI 21.95 kg/m2      Temp (24hrs), Av.3 °F (36.8 °C), Min:97.6 °F (36.4 °C), Max:99.1 °F (37.3 °C)    Oxygen Therapy  O2 Sat (%): 95 % (17 0754)  Pulse via Oximetry: 63 beats per minute (09/15/17 0541)  O2 Device: Room air (09/15/17 0541)  No intake or output data in the 24 hours ending 17 0800       General: No acute distress. HEENT:           Head ATNC, PERRLA+, no pallor or ict, neck suppler, no JVD  Lungs:  CTA Bilaterally. CVS:  Regular rate and rhythm,  No murmur, rub, or gallop, No JVD, No lower   extremity edema. Abdomen: Soft, Non distended, Non tender, Positive bowel sounds. MSK:  No deformities, lesions, Spontaneously moves extremities. Neurologic:  gcs 15, speech clear, left facial droop, flaccid left upper EXT, 4/5 LLE, cerebellar functions intact. Strength 5/5 in RUE/RLE  Psychiatry:      No anxiety/Depression  Skin:   No rash/lesions.  Good skin turgor  Lymphatics:  No LAD    Recent Results (from the past 24 hour(s))   PLEASE READ & DOCUMENT PPD TEST IN 24 HRS    Collection Time: 17  1:32 PM Result Value Ref Range    PPD neg Negative    mm Induration 0 mm   CBC W/O DIFF    Collection Time: 09/20/17  5:31 AM   Result Value Ref Range    WBC 7.7 4.3 - 11.1 K/uL    RBC 4.46 4.23 - 5.67 M/uL    HGB 14.6 13.6 - 17.2 g/dL    HCT 41.5 41.1 - 50.3 %    MCV 93.0 79.6 - 97.8 FL    MCH 32.7 26.1 - 32.9 PG    MCHC 35.2 (H) 31.4 - 35.0 g/dL    RDW 13.4 11.9 - 14.6 %    PLATELET 816 303 - 521 K/uL    MPV 11.6 10.8 - 60.0 FL   METABOLIC PANEL, BASIC    Collection Time: 09/20/17  5:31 AM   Result Value Ref Range    Sodium 145 136 - 145 mmol/L    Potassium 3.5 3.5 - 5.1 mmol/L    Chloride 113 (H) 98 - 107 mmol/L    CO2 22 21 - 32 mmol/L    Anion gap 10 7 - 16 mmol/L    Glucose 124 (H) 65 - 100 mg/dL    BUN 30 (H) 8 - 23 MG/DL    Creatinine 1.09 0.8 - 1.5 MG/DL    GFR est AA >60 >60 ml/min/1.73m2    GFR est non-AA >60 >60 ml/min/1.73m2    Calcium 8.2 (L) 8.3 - 10.4 MG/DL         Imaging /Procedures /Studies   MRI brain w/o contrast:  IMPRESSION: Acute to early subacute large right MCA territory infarct.     CTA head and neck:  IMPRESSION: Diffuse arterial disease with 50% right ICA NASCET stenosis and 99%  NASCET stenosis at the left ICA. Stenosis of both vertebral artery origins also  is demonstrated.     No significant intracranial arterial pathology.   ASSESSMENT      Hospital Problems as of 9/20/2017  Never Reviewed          Codes Class Noted - Resolved POA    * (Principal)Stroke ([facility] [address] ICD-10-CM: I63.9  ICD-9-CM: 434.91  9/14/2017 - Present Unknown        CAD (coronary artery disease) (Chronic) ICD-10-CM: I25.10  ICD-9-CM: 414.00  10/6/2015 - Present Yes        PAF (paroxysmal atrial fibrillation) (HCC) (Chronic) ICD-10-CM: I48.0  ICD-9-CM: 427.31  10/6/2015 - Present Yes    Overview Signed 4/26/2017 12:59 PM by Cj Mccallum MD     [facility]             Dyslipidemia (Chronic) ICD-10-CM: E78.5  ICD-9-CM: 272.4  10/6/2015 - Present Yes                  Plan:    - extensive right MCA territory stroke with LUE flaccidity:  CTA head showed 50-69% stenosis of ORTIZ and >54% stenosis of LICA. No urgent surgical intervention. Follow up with vascular surgery clinic in 1 month. Echo showed normal LVEF 55-60% with mildly dilated left atrium, no right to left shunt. - continue start plavix and aspirin, continue lipitor.  - [id number] score of 5, will need OAC, probably 1-2 weeks later. Should follow up with outpatient neuro and cardiology. - continue PT/OT eval  - DVT prophylaxis with lovenox. - will resume norvasc, lisinopril and lopressor. Disposition: accepted by [facility]. Will likely be discharged on 9/21.     More Stoll MD

## 2017-09-20 NOTE — PROGRESS NOTES
Problem: Dysphagia (Adult)  Goal: *Acute Goals and Plan of Care (Insert Text)  STG: Pt will consume trials pureed/nectar thick liquids without signs/sx aspiration 100% (goal revised 9/15/17)  STG: Pt will complete oral motor exercises with 80% accuracy. STG: Pt will participate in a ST evaluation x1. LTG: Pt will tolerate least restrictive diet without signs/sx aspiration 100% for safe swallow function. SPEECH LANGUAGE PATHOLOGY: BEDSIDE SWALLOW NOTE: Daily Note 2     NAME/AGE/GENDER: Mya Tobar is a 80 y.o. male  DATE: 9/20/2017  PRIMARY DIAGNOSIS: Stroke [facility])       ICD-10: Treatment Diagnosis: dysphagia, oropharyngeal 13.12  INTERDISCIPLINARY COLLABORATION: Registered Nurse and Physician  PRECAUTIONS/ALLERGIES: Review of patient's allergies indicates no known allergies. ASSESSMENT:   Patient seen for dysphagia therapy. Sitting up in the chair. Continues to fluctuate in level of alertness. Patient's eyes were open and he was talkative as he was leaving to work with OT earlier today but is more lethargic with his eyes closed during the majority of the session. Patient repeated ST's name after his wife and responded appropriately to basic questions at first.  No overt signs/sx of aspiration with nectar liquids via the straw. Brief delayed initiation of the swallow. Increased pocketing with the pureed trial with patient becoming increasingly more lethargic with increased cues to swallow. Mild anterior spillage. No overt signs/sx of aspiration once delayed swallow was elicited. No additional trials provided due to decreased level of alertness. Per family, patient was more awake earlier and ate half of the pureed foods his wife brought in for him which were consistent with pureed textures. As SLP was leaving, patient opened his eyes. Attempted to participate with oral motor exercises. Lingual ROM and resistance exercises completed x5. Lingual deviation to the left side.   Patient once again became drowsy and stopped participating. A written copy of exercises was provided for practice with family. Recommend continue pureed/nectar thick liquids. Family requesting oatmeal and yogurt with breakfast.  Also interested in nectar thick vanilla or strawberry mighty shakes. Requests added to diet orders. Re-iterated to only feed patient when fully awake/alert. Patient will benefit from skilled intervention to address the below impairments. ?????? ? ? This section established at most recent assessment??????????  PROBLEM LIST (Impairments causing functional limitations):  1. Dysphagia  2. Facial weakness  3. Dysarthria  4. dysfluencies  REHABILITATION POTENTIAL FOR STATED GOALS: GOOD      PLAN OF CARE:   Patient will benefit from skilled intervention to address the following impairments. RECOMMENDATIONS AND PLANNED INTERVENTIONS (Benefits and precautions of therapy have been discussed with the patient.):  · PO:  Pureed and Liquids:  nectar  MEDICATIONS:  · Crushed in puree  COMPENSATORY STRATEGIES/MODIFICATIONS INCLUDING:  · None  OTHER RECOMMENDATIONS (including follow up treatment recommendations):   · Oral motor exercises  · ST evaluation  · Po trials  RECOMMENDED DIET MODIFICATIONS DISCUSSED WITH:  · Hospitalist  · Nursing  · ER MD  · Patient  FREQUENCY/DURATION: Continue to follow patient 3 times a week for duration of hospital stay to address above goals. RECOMMENDED REHABILITATION/EQUIPMENT: (at time of discharge pending progress): Due to the probability of continued deficits (see above) this patient will likely need continued skilled speech therapy after discharge. SUBJECTIVE:   Pt drowsy but cooperative. History of Present Injury/Illness: Mr. José Miguel Thurman  has a past medical history of Arrhythmia; Arthritis; CAD (coronary artery disease) (12/1999); Carotid artery stenosis without cerebral infarction (1/18/2016); Chronic pain; Claudication ([facility] [address]  Coronary atherosclerosis of native coronary artery (1/18/2016); GERD (gastroesophageal reflux disease); Hyperlipidemia; Hypertension; Kidney stones; MI (myocardial infarction) ([facility] [address] (12/1999); PAF (paroxysmal atrial fibrillation) ([facility] and Thromboembolus ([facility] .   He also  has a past surgical history that includes cardiac surg procedure unlist (12/1999); abdomen surgery proc unlisted (2000?); appendectomy (age 15); heent (1's?); and ptca. Present Symptoms: dysphagia    Pain Intensity 1: 0  Pain Location 1: Head  Pain Intervention(s) 1: Nurse notified  Current Medications:   No current facility-administered medications on file prior to encounter. Current Outpatient Prescriptions on File Prior to Encounter   Medication Sig Dispense Refill    naproxen (NAPROSYN) 500 mg tablet TAKE ONE TABLET BY MOUTH ONCE DAILY AS NEEDED 30 Tab 0    metoprolol tartrate (LOPRESSOR) 25 mg tablet Take 0.5 Tabs by mouth every morning. 30 Tab 6    esomeprazole (NEXIUM) 20 mg capsule Take 20 mg by mouth daily.  magnesium 250 mg tab Take 250 mg by mouth daily.  furosemide (LASIX) 40 mg tablet Take 1 Tab by mouth daily. 90 Tab 3    aspirin 81 mg chewable tablet Take 1 Tab by mouth daily.  lisinopril (PRINIVIL, ZESTRIL) 40 mg tablet Take 1 Tab by mouth every morning. 30 Tab 6    nitroglycerin (NITROSTAT) 0.4 mg SL tablet 1 Tab by SubLINGual route every five (5) minutes as needed for Chest Pain. 2 Bottle 4    atorvastatin (LIPITOR) 40 mg tablet Take 40 mg by mouth daily.  tamsulosin (FLOMAX) 0.4 mg capsule Take 0.4 mg by mouth daily.  amlodipine (NORVASC) 5 mg tablet Take 5 mg by mouth every morning.  MULTI-VITAMIN PO Take 1 Tab by mouth every morning.  zinc 50 mg capsule Take 50 mg by mouth every morning.  ascorbic acid (VITAMIN C) 1,000 mg tablet Take 1,000 mg by mouth every morning.  lutein 6 mg Cap Take 1 Tab by mouth every morning.  arginine 500 mg tablet Take 500 mg by mouth every morning.       sildenafil citrate (VIAGRA) 100 mg tablet Take 1 Tab by mouth as needed. 10 Tab 3     Current Dietary Status:  NPO                 History of reflux:  NO   · Reflux medication:  Social History/Home Situation: residing with spouse   Home Environment: Private residence  # Steps to Enter: 1  One/Two Story Residence: One story  Living Alone: No  Support Systems: Spouse/Significant Other/Partner  Patient Expects to be Discharged to[de-identified] Unknown  Current DME Used/Available at Home: None  Tub or Shower Type: Tub/Shower combination      OBJECTIVE:   Respiratory Status:        CXR Results: n/a  MRI/CT Results: Probable subacute right MCA CVA. Note: If a subtle CVA is suspected,  MRI would be more definitive if clinically warranted. Cognitive and Communication Status:  Neurologic State: Alert  Orientation Level: Oriented X4  Cognition: Follows commands  Perception: Cues to maintain midline in sitting  Perseveration: No perseveration noted  Safety/Judgement: Decreased insight into deficits     BEDSIDE SWALLOW EVALUATION  Oral Assessment:  Oral Assessment  Labial: Left droop  Dentition: Upper dentures; Intact; Natural  Lingual: Left deviation  P.O. Trials: The patient was given teaspoon to straw amounts of the following:   Consistency Presented: Pudding; nectar thick liquid  How Presented: Self-fed/presented;Straw;Successive swallows     ORAL PHASE:  Bolus Acceptance: Impaired  Bolus Formation/Control: Impaired  Propulsion: Delayed (# of seconds)  Type of Impairment: Delayed  Oral Residue: 10-50% of bolus     PHARYNGEAL PHASE:        Aspiration Signs/Symptoms: None  Vocal Quality: No impairment                 OTHER OBSERVATIONS:  Rate/bite size: Impaired   Endurance:  Impaired      Comments:         Tool Used: Dysphagia Outcome and Severity Scale (JOSÉ)     Score Comments   Normal Diet  [ ] 7 With no strategies or extra time needed   Functional Swallow  [ ] 6 May have mild oral or pharyngeal delay         Mild Dysphagia     [ ] 5 Which may require one diet consistency restricted (those who demonstrate penetration which is entirely cleared on MBS would be included)   Mild-Moderate Dysphagia  [ ] 4 With 1-2 diet consistencies restricted         Moderate Dysphagia  [ ] 3 With 2 or more diet consistencies restricted         Moderately Severe Dysphagia  [ ] 2 With partial PO strategies (trials with ST only)         Severe Dysphagia  [ ] 1 With inability to tolerate any PO safely            Score:  Initial: 2 Most Recent: 3 (Date: 9/20/17 )   Interpretation of Tool: The Dysphagia Outcome and Severity Scale (JOSÉ) is a simple, easy-to-use, 7-point scale developed to systematically rate the functional severity of dysphagia based on objective assessment and make recommendations for diet level, independence level, and type of nutrition. Score 7 6 5 4 3 2 1   Modifier CH CI CJ CK CL CM CN   · Swallowing:               - CURRENT STATUS:           CM - 80%-99% impaired, limited or restricted               - GOAL STATUS:                   CK - 40%-59% impaired, limited or restricted               - D/C STATUS:                       ---------------To be determined---------------  Payor: [facility] MEDICARE / Plan: [facility] MEDICARE / Product Type: Managed Care Medicare /       TREATMENT:               (In addition to Assessment/Re-Assessment sessions the following treatments were rendered)  Dysphagia Activities: Activities/Procedures listed utilized to improve progress in swallow strength and swallow function. Required moderate cueing to improve swallow safety. ORAL MOTOR  EXERCISES:                    Labial Closure:  Yes  Reps : 5  Sets: 1                                                                Lingual Lateralization-Interior: Yes  Reps : 5  Sets : 1  Lingual Lateralization-Exterior: Yes  Reps : 5  Sets: 1                                                            LARYNGEAL / PHARYNGEAL EXERCISES: __________________________________________________________________________________________________  Safety:   After treatment position/precautions:  · Upright in Bed  Treatment Assessment:   . Progression/Medical Necessity:   · Skilled intervention continues to be required due to persistent signs and symptoms of aspiration. Compliance with Program/Exercises: Will assess as treatment progresses. Reason for Continuation of Services/Other Comments:  · Patient continues to require skilled intervention due to dysphagia .   Recommendations/Intent for next treatment session: \"Treatment next visit will focus on diet tolerance/oral motor exercises/ST assessment  Total Treatment Duration:  Time In: 1342  Time Out: Snigh PARKS, CCC-SLP

## 2017-09-20 NOTE — PROGRESS NOTES
Problem: Self Care Deficits Care Plan (Adult)  Goal: *Acute Goals and Plan of Care (Insert Text)  1. Patient will feed self entire meal with set up assistance and adaptive utensils as needed. 2. Patient will complete full body dressing and bathing with minimal assistance and adaptive equipment as needed. 3. Patient will participate in BUE therapeutic exercises to increase strength in LUE to at least 3/5 for participation in ADLs and functional transfers. 4. Patient will participate in [address] [place] [address] therapeutic activities to increase coordination in LUE to [facility] for bimanual fine motor ADLs. 5. Patient will attend to L side for 100% of treatment session with no verbal cues from therapist.   6. Patient will complete functional transfers with minimal assistance and adaptive equipment as needed. Timeframe: 7 visits     Comments:       OCCUPATIONAL THERAPY: Daily Note, Treatment Day: 2nd and AM    9/20/2017  INPATIENT: Hospital Day: 7  Payor: [facility] / Plan: [place] [facility] / Product Type: [place] Care Medicare /      NAME/AGE/GENDER: Licha Riley is a 80 y.o. 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 ASSESSMENT:   Mr. Starla Ivy was admitted for the above diagnosis. Pt presents supine upon arrival. Pt required mod/max assist to perform sit to stand and transfer over to recliner. Pt was assisted with shaving as well today. PROM performed to LUE to increase mobility and coordination. Pt with no active movement in LUE.  Pt was brought to therapy gym to participate in group exercises (in grid below) to increase UE strength and activity tolerance to perform mobility and ADLs. Pt required visual, verbal, and tactile cueing to complete exercises. Pt tolerated session well. Returned to room by recliner. Pt will continue to benefit from skilled OT during stay. This section established at most recent assessment   PROBLEM LIST (Impairments causing functional limitations):  1. Decreased Strength  2. Decreased ADL/Functional Activities  3. Decreased Transfer Abilities  4. Decreased Ambulation Ability/Technique  5. Decreased Balance  6. Increased Pain  7. Decreased Activity Tolerance  8. Decreased Work Simplification/Energy Conservation Techniques  9. Decreased Knowledge of Precautions    INTERVENTIONS PLANNED: (Benefits and precautions of occupational therapy have been discussed with the patient.)  1. Activities of daily living training  2. Adaptive equipment training  3. Balance training  4. Clothing management  5. Donning&doffing training  6. Group therapy  7. Nacho tech training  8. Hygiene training  9. Neuromuscular re-eduation  10. Sensory reintegration training  11. Therapeutic activity  12. Therapeutic exercise      TREATMENT PLAN: Frequency/Duration: Follow patient 4x/week to address above goals. Rehabilitation Potential For Stated Goals: GOOD      RECOMMENDED REHABILITATION/EQUIPMENT: (at time of discharge pending progress): Due to the probability of continued deficits (see above) this patient will likely need continued skilled occupational therapy after discharge. Equipment:   · Hygiene Aides, Type: Tub Seat/Chair               OCCUPATIONAL PROFILE AND HISTORY:   History of Present Injury/Illness (Reason for Referral):  See H&P  Past Medical History/Comorbidities:   Mr. Shani Turner  has a past medical history of Arrhythmia; Arthritis; CAD (coronary artery disease) (12/1999); Carotid artery stenosis without cerebral infarction (1/18/2016); Chronic pain; Claudication ([facility] [address]  Coronary atherosclerosis of native coronary artery (1/18/2016); GERD (gastroesophageal reflux disease); Hyperlipidemia; Hypertension; Kidney stones; MI (myocardial infarction) ([facility] [address] (12/1999); PAF (paroxysmal atrial fibrillation) ([facility] [address] and Thromboembolus ([facility] [address] Mr. Lucia Segovia  has a past surgical history that includes cardiac surg procedure unlist (12/1999); abdomen surgery proc unlisted (2000?); appendectomy (age 15); heent (1's?); and ptca. Social History/Living Environment:   Home Environment: Private residence  # Steps to Enter: 1  One/Two Story Residence: One story  Living Alone: No  Support Systems: Spouse/Significant Other/Partner  Patient Expects to be Discharged to[de-identified] Unknown  Current DME Used/Available at Home: None  Tub or Shower Type: Tub/Shower combination  Prior Level of Function/Work/Activity:  Independent with ADLs  Personal Factors:          Sex:  male        Age:  80 y.o. Number of Personal Factors/Comorbidities that affect the Plan of Care: Expanded review of therapy/medical records (1-2):  MODERATE COMPLEXITY   ASSESSMENT OF OCCUPATIONAL PERFORMANCE[de-identified]   Activities of Daily Living:           Basic ADLs (From Assessment) Complex ADLs (From Assessment)   Basic ADL  Feeding: Minimum assistance  Oral Facial Hygiene/Grooming: Moderate assistance  Bathing: Maximum assistance  Upper Body Dressing: Moderate assistance  Lower Body Dressing: Total assistance  Toileting: Total assistance Instrumental ADL  Meal Preparation: Total assistance  Homemaking: Total assistance  Medication Management: Total assistance  Financial Management: Total assistance   Grooming/Bathing/Dressing Activities of Daily Living   Grooming  Shaving: Maximum assistance (due to using safety razor) Cognitive Retraining  Safety/Judgement: Decreased insight into deficits                       Bed/Mat Mobility  Supine to Sit: Moderate assistance;Maximum assistance  Sit to Supine: Maximum assistance;Assist x2  Sit to Stand:  Moderate assistance  Bed to Chair: Moderate assistance;Assist x2          Most Recent Physical Functioning: Gross Assessment:                  Posture:  Posture (WDL): Exceptions to WDL  Posture Assessment: Asymmetry (comment) (increased L lean)  Balance:  Sitting: Impaired  Sitting - Static: Fair (occasional)  Sitting - Dynamic: Fair (occasional)  Standing: Impaired  Standing - Static: Poor  Standing - Dynamic : Poor Bed Mobility:  Supine to Sit: Moderate assistance;Maximum assistance  Sit to Supine: Maximum assistance;Assist x2  Wheelchair Mobility:     Transfers:  Sit to Stand: Moderate assistance  Stand to Sit: Maximum assistance  Bed to Chair: Moderate assistance;Assist x2       Patient Vitals for the past 6 hrs:   BP BP Patient Position SpO2 Pulse   17 1149 178/73 Sitting 95 % 85   17 1452 93/53 Sitting 94 % (!) 58        Mental Status  Neurologic State: Alert  Orientation Level: Oriented X4  Cognition: Follows commands  Perception: Cues to maintain midline in sitting  Perseveration: No perseveration noted  Safety/Judgement: Decreased insight into deficits                               Physical Skills Involved:  1. Range of Motion  2. Balance  3. Strength  4. Activity Tolerance  5. Sensation  6. Fine Motor Control  7. Gross Motor Control  8. Vision Cognitive Skills Affected (resulting in the inability to perform in a timely and safe manner):  1. Sustained Attention  2. Divided Attention Psychosocial Skills Affected:  1. Habits/Routines  2. Environmental Adaptation   Number of elements that affect the Plan of Care: 5+:  HIGH COMPLEXITY   CLINICAL DECISION MAKIN [facility] [address] [address] AM-PAC 6 Clicks   Daily Activity Inpatient Short Form  How much help from another person does the patient currently need. .. Total A Lot A Little None   1. Putting on and taking off regular lower body clothing? [X] 1   [ ] 2   [ ] 3   [ ] 4   2. Bathing (including washing, rinsing, drying)? [ ] 1   [X] 2   [ ] 3   [ ] 4   3.   Toileting, which includes using toilet, bedpan or urinal?   [X] 1   [ ] 2   [ ] 3   [ ] 4 4.  Putting on and taking off regular upper body clothing?   [ ] 1   [X] 2   [ ] 3   [ ] 4   5. Taking care of personal grooming such as brushing teeth? [ ] 1   [X] 2   [ ] 3   [ ] 4   6. Eating meals? [ ] 1   [ ] 2   [X] 3   [ ] 4   © 2007, Trustees of [facility] [employer], under license to [employer]. All rights reserved    Score:  Initial: 11 Most Recent: X (Date: -- )     Interpretation of Tool:  Represents activities that are increasingly more difficult (i.e. Bed mobility, Transfers, Gait). Score 24 23 22-20 19-15 14-10 9-7 6       Modifier CH CI CJ CK CL CM CN         · Self Care:               - CURRENT STATUS:    CL - 60%-79% impaired, limited or restricted               - GOAL STATUS:           CK - 40%-59% impaired, limited or restricted               - D/C STATUS:                       ---------------To be determined---------------  Payor: [facility] / [place]: [place] [facility] / Product Type: Managed Care Medicare /       Medical Necessity:     · Patient is expected to demonstrate progress in strength, range of motion, balance, coordination and functional technique to increase independence with ADLs. Reason for Services/Other Comments:  · Patient continues to require skilled intervention due to medical complications. Use of outcome tool(s) and clinical judgement create a POC that gives a: MODERATE COMPLEXITY             TREATMENT:   (In addition to Assessment/Re-Assessment sessions the following treatments were rendered)      Pre-treatment Symptoms/Complaints:    Pain: Initial:   Pain Intensity 1: 0  Post Session:  same      Self Care: (15 minutes): Procedure(s) (per grid) utilized to improve and/or restore self-care/home management as related to grooming. Required maximal manual and tactile cueing to facilitate activities of daily living skills. Therapeutic Activity: (10 minutes):   Therapeutic activities including Bed transfers and Chair transfers to improve mobility, strength and balance. Required maximal Safety awareness training; Tactile cues; Verbal cues to promote static and dynamic balance in standing. Therapeutic Exercise: (10 minutes):  Exercises per grid below to improve mobility, strength, coordination and dynamic movement of arm - left, upper to improve functional bending, lifting, carrying and reaching. Required maximal manual cues to promote proper body alignment and promote proper body mechanics. Progressed repetitions as indicated. UE Exercises (RUE with red theraband; LUE PROM) Date:  9/20/2017 Date:   Date:     Activity/Exercise Parameters Parameters Parameters   Shoulder Abd/Adduction 15 reps     Shoulder Flexion 10 reps     Elbow Flexion 15 reps     Punches 15 reps                         Braces/Orthotics/Lines/Etc:   · O2 Device: Room air  Treatment/Session Assessment:    · Response to Treatment:  [address] participation  · Interdisciplinary Collaboration:Certified Occupational Therapy Assistant and Registered Nurse  · After treatment position/precautions:Up in chair, Bed/Chair-wheels locked, Call light within reach and Family at bedside  · Compliance with Program/Exercises: compliant all of the time. · Recommendations/Intent for next treatment session: \"Next visit will focus on advancements to more challenging activities and reduction in assistance provided\".   Total Treatment Duration:  OT Patient Time In/Time Out  Time In: 1035 (1150)  Time Out: 1100 (1200)     Jose Campos

## 2017-09-20 NOTE — PROGRESS NOTES
SPEECH PATHOLOGY NOTE:    Attempted to see pt this am for dysphagia treatment. Leaving the room for group therapy with OT. Will re-attempt later today schedule permitting.     Ismael Rawls MS, CCC-SLP

## 2017-09-21 LAB
MM INDURATION POC: 0 MM (ref 0–5)
PPD POC: 0 NEGATIVE

## 2017-09-21 PROCEDURE — 65660000000 HC RM CCU STEPDOWN

## 2017-09-21 PROCEDURE — 74011250636 HC RX REV CODE- 250/636: Performed by: HOSPITALIST

## 2017-09-21 PROCEDURE — 74011000258 HC RX REV CODE- 258: Performed by: HOSPITALIST

## 2017-09-21 PROCEDURE — 74011250637 HC RX REV CODE- 250/637: Performed by: HOSPITALIST

## 2017-09-21 PROCEDURE — 92526 ORAL FUNCTION THERAPY: CPT

## 2017-09-21 PROCEDURE — 74011250636 HC RX REV CODE- 250/636: Performed by: INTERNAL MEDICINE

## 2017-09-21 PROCEDURE — 97530 THERAPEUTIC ACTIVITIES: CPT

## 2017-09-21 RX ORDER — POTASSIUM CHLORIDE 20 MEQ/1
40 TABLET, EXTENDED RELEASE ORAL 2 TIMES DAILY
Status: DISCONTINUED | OUTPATIENT
Start: 2017-09-21 | End: 2017-09-22 | Stop reason: HOSPADM

## 2017-09-21 RX ADMIN — Medication 10 ML: at 16:08

## 2017-09-21 RX ADMIN — POTASSIUM CHLORIDE 40 MEQ: 20 TABLET, EXTENDED RELEASE ORAL at 12:51

## 2017-09-21 RX ADMIN — CLONIDINE HYDROCHLORIDE 0.2 MG: 0.2 TABLET ORAL at 22:38

## 2017-09-21 RX ADMIN — PANTOPRAZOLE SODIUM 40 MG: 40 TABLET, DELAYED RELEASE ORAL at 06:17

## 2017-09-21 RX ADMIN — ENOXAPARIN SODIUM 40 MG: 40 INJECTION SUBCUTANEOUS at 16:07

## 2017-09-21 RX ADMIN — Medication 5 ML: at 22:38

## 2017-09-21 RX ADMIN — METOPROLOL TARTRATE 50 MG: 50 TABLET ORAL at 22:38

## 2017-09-21 RX ADMIN — LISINOPRIL 40 MG: 20 TABLET ORAL at 09:39

## 2017-09-21 RX ADMIN — AMLODIPINE BESYLATE 10 MG: 10 TABLET ORAL at 09:40

## 2017-09-21 RX ADMIN — TAMSULOSIN HYDROCHLORIDE 0.4 MG: 0.4 CAPSULE ORAL at 09:40

## 2017-09-21 RX ADMIN — SODIUM CHLORIDE 1.5 G: 900 INJECTION, SOLUTION INTRAVENOUS at 04:24

## 2017-09-21 RX ADMIN — METOPROLOL TARTRATE 50 MG: 50 TABLET ORAL at 09:40

## 2017-09-21 RX ADMIN — ATORVASTATIN CALCIUM 80 MG: 40 TABLET, FILM COATED ORAL at 09:40

## 2017-09-21 RX ADMIN — FUROSEMIDE 40 MG: 40 TABLET ORAL at 09:41

## 2017-09-21 RX ADMIN — CLONIDINE HYDROCHLORIDE 0.2 MG: 0.2 TABLET ORAL at 09:47

## 2017-09-21 RX ADMIN — CLOPIDOGREL BISULFATE 75 MG: 75 TABLET ORAL at 09:41

## 2017-09-21 RX ADMIN — POTASSIUM CHLORIDE 40 MEQ: 20 TABLET, EXTENDED RELEASE ORAL at 17:06

## 2017-09-21 RX ADMIN — Medication 5 ML: at 06:17

## 2017-09-21 RX ADMIN — ASPIRIN 81 MG 81 MG: 81 TABLET ORAL at 09:40

## 2017-09-21 NOTE — PROGRESS NOTES
Problem: Self Care Deficits Care Plan (Adult)  Goal: *Acute Goals and Plan of Care (Insert Text)  1. Patient will feed self entire meal with set up assistance and adaptive utensils as needed. 2. Patient will complete full body dressing and bathing with minimal assistance and adaptive equipment as needed. 3. Patient will participate in BUE therapeutic exercises to increase strength in LUE to at least 3/5 for participation in ADLs and functional transfers. 4. Patient will participate in [address] [place] [address] therapeutic activities to increase coordination in [facility] for bimanual fine motor ADLs. 5. Patient will attend to L side for 100% of treatment session with no verbal cues from therapist.   6. Patient will complete functional transfers with minimal assistance and adaptive equipment as needed. Timeframe: 7 visits     Comments:       OCCUPATIONAL THERAPY: Daily Note, Treatment Day: 3rd and PM    9/21/2017  INPATIENT: Hospital Day: 8  Payor: [facility] / Plan: [facility] MEDICARE / Product Type: [employer] Care Medicare /      NAME/AGE/GENDER: Edward Anne is a 80 y.o. male     PRIMARY DIAGNOSIS:  Stroke ([facility] [address] Stroke ([facility] [address] Stroke ([facility] [address]        ICD-10: Treatment Diagnosis:        · Generalized Muscle Weakness (M62.81)  · Other lack of cordination (R27.8)  · Hemiplegia and hemiparesis following cerebral infarction affecting left non-dominant side (M09.230)   Precautions/Allergies:         Review of patient's allergies indicates no known allergies. ASSESSMENT:   Mr. Najma Pop was admitted for the above diagnosis. Pt presents supine upon arrival. Pt required min assist for supine to sit transfer today. Pt demonstrates occasional sitting balance at edge of bed today. Worked on trunk strengthening activities at edge of bed. Pt noted to have more left neglect at this time. He started to lean more heavy to left and was returned to supine with min assist. PROM was performed to E as well.  Pt will continue to benefit from skilled OT during stay. This section established at most recent assessment   PROBLEM LIST (Impairments causing functional limitations):  1. Decreased Strength  2. Decreased ADL/Functional Activities  3. Decreased Transfer Abilities  4. Decreased Ambulation Ability/Technique  5. Decreased Balance  6. Increased Pain  7. Decreased Activity Tolerance  8. Decreased Work Simplification/Energy Conservation Techniques  9. Decreased Knowledge of Precautions    INTERVENTIONS PLANNED: (Benefits and precautions of occupational therapy have been discussed with the patient.)  1. Activities of daily living training  2. Adaptive equipment training  3. Balance training  4. Clothing management  5. Donning&doffing training  6. Group therapy  7. Nacho tech training  8. Hygiene training  9. Neuromuscular re-eduation  10. Sensory reintegration training  11. Therapeutic activity  12. Therapeutic exercise      TREATMENT PLAN: Frequency/Duration: Follow patient 4x/week to address above goals. Rehabilitation Potential For Stated Goals: GOOD      RECOMMENDED REHABILITATION/EQUIPMENT: (at time of discharge pending progress): Due to the probability of continued deficits (see above) this patient will likely need continued skilled occupational therapy after discharge. Equipment:   · Hygiene Aides, Type: Tub Seat/Chair               OCCUPATIONAL PROFILE AND HISTORY:   History of Present Injury/Illness (Reason for Referral):  See H&P  Past Medical History/Comorbidities:   Mr. Sherlyn Rincon  has a past medical history of Arrhythmia; Arthritis; CAD (coronary artery disease) (12/1999); Carotid artery stenosis without cerebral infarction (1/18/2016); Chronic pain; Claudication ([facility] [address] Coronary atherosclerosis of native coronary artery (1/18/2016); GERD (gastroesophageal reflux disease); Hyperlipidemia; Hypertension; Kidney stones; MI (myocardial infarction) ([address] (12/1999); PAF (paroxysmal atrial fibrillation) ([facility] [address] and Thromboembolus ([facility] [address] Mr. José Miguel Thurman  has a past surgical history that includes cardiac surg procedure unlist (12/1999); abdomen surgery proc unlisted (2000?); appendectomy (age 15); heent (1's?); and ptca. Social History/Living Environment:   Home Environment: Private residence  # Steps to Enter: 1  One/Two Story Residence: One story  Living Alone: No  Support Systems: Spouse/Significant Other/Partner  Patient Expects to be Discharged to[de-identified] Unknown  Current DME Used/Available at Home: None  Tub or Shower Type: Tub/Shower combination  Prior Level of Function/Work/Activity:  Independent with ADLs  Personal Factors:          Sex:  male        Age:  80 y.o. Number of Personal Factors/Comorbidities that affect the Plan of Care: Expanded review of therapy/medical records (1-2):  MODERATE COMPLEXITY   ASSESSMENT OF OCCUPATIONAL PERFORMANCE[de-identified]   Activities of Daily Living:           Basic ADLs (From Assessment) Complex ADLs (From Assessment)   Basic ADL  Feeding: Minimum assistance  Oral Facial Hygiene/Grooming: Moderate assistance  Bathing: Maximum assistance  Upper Body Dressing: Moderate assistance  Lower Body Dressing: Total assistance  Toileting: Total assistance Instrumental ADL  Meal Preparation: Total assistance  Homemaking: Total assistance  Medication Management: Total assistance  Financial Management: Total assistance   Grooming/Bathing/Dressing Activities of Daily Living     Cognitive Retraining  Safety/Judgement: Decreased awareness of environment                       Bed/Mat Mobility  Supine to Sit: Minimum assistance  Sit to Supine: Minimum assistance; Moderate assistance          Most Recent Physical Functioning:   Gross Assessment:                  Posture:  Posture (WDL): Exceptions to WDL  Posture Assessment: Asymmetry (comment) (increased L lean)  Balance:  Sitting: Impaired  Sitting - Static: Occassional  Sitting - Dynamic: Occassional Bed Mobility:  Supine to Sit: Minimum assistance  Sit to Supine: Minimum assistance; Moderate assistance  Wheelchair Mobility:     Transfers:          Patient Vitals for the past 6 hrs:   BP BP Patient Position SpO2 Pulse   17 1200 117/60 At rest 97 % (!) 51   17 1518 141/56 Sitting 92 % (!) 50        Mental Status  Neurologic State: Alert  Orientation Level: Oriented X4  Cognition: Follows commands  Perception: Cues to maintain midline in sitting  Perseveration: No perseveration noted  Safety/Judgement: Decreased awareness of environment                               Physical Skills Involved:  1. Range of Motion  2. Balance  3. Strength  4. Activity Tolerance  5. Sensation  6. Fine Motor Control  7. Gross Motor Control  8. Vision Cognitive Skills Affected (resulting in the inability to perform in a timely and safe manner):  1. Sustained Attention  2. Divided Attention Psychosocial Skills Affected:  1. Habits/Routines  2. Environmental Adaptation   Number of elements that affect the Plan of Care: 5+:  HIGH COMPLEXITY   CLINICAL DECISION MAKIN[address] [place], [place] [id number] AM-PAC 6 Clicks   Daily Activity Inpatient Short Form  How much help from another person does the patient currently need. .. Total A Lot A Little None   1. Putting on and taking off regular lower body clothing? [X] 1   [ ] 2   [ ] 3   [ ] 4   2. Bathing (including washing, rinsing, drying)? [ ] 1   [X] 2   [ ] 3   [ ] 4   3. Toileting, which includes using toilet, bedpan or urinal?   [X] 1   [ ] 2   [ ] 3   [ ] 4   4. Putting on and taking off regular upper body clothing?   [ ] 1   [X] 2   [ ] 3   [ ] 4   5. Taking care of personal grooming such as brushing teeth? [ ] 1   [X] 2   [ ] 3   [ ] 4   6. Eating meals? [ ] 1   [ ] 2   [X] 3   [ ] 4   © , Trustees of [address] [place] [address], under license to [employer].  All rights reserved    Score:  Initial: 11 Most Recent: X (Date: -- )     Interpretation of Tool:  Represents activities that are increasingly more difficult (i.e. Bed mobility, Transfers, Gait).       Score 24 23 22-20 19-15 14-10 9-7 6       Modifier CH CI CJ CK CL CM CN         · Self Care:               - CURRENT STATUS:    CL - 60%-79% impaired, limited or restricted               - GOAL STATUS:           CK - 40%-59% impaired, limited or restricted               - D/C STATUS:                       ---------------To be determined---------------  [facility] [place] [facility] / [place] [facility] [place] / Product Type: [employer] Medicare /       Medical Necessity:     · Patient is expected to demonstrate progress in strength, range of motion, balance, coordination and functional technique to increase independence with ADLs. Reason for Services/Other Comments:  · Patient continues to require skilled intervention due to medical complications. Use of outcome tool(s) and clinical judgement create a POC that gives a: MODERATE COMPLEXITY             TREATMENT:   (In addition to Assessment/Re-Assessment sessions the following treatments were rendered)      Pre-treatment Symptoms/Complaints:    Pain: Initial:   Pain Intensity 1: 0  Post Session:  same     Therapeutic Activity: (20 minutes): Therapeutic activities including Bed transfers and sitting edge of bed to improve mobility, strength and balance.   Required moderate assistance to promote static and dynamic balance in sitting    UE Exercises (RUE with red theraband; LUE PROM) Date:  9/20/2017 Date:   Date:     Activity/Exercise Parameters Parameters Parameters   Shoulder Abd/Adduction 15 reps     Shoulder Flexion 10 reps     Elbow Flexion 15 reps     Punches 15 reps                         Braces/Orthotics/Lines/Etc:   · O2 Device: Room air  Treatment/Session Assessment:    · Response to Treatment:  [address] participation  · Interdisciplinary Collaboration:Certified Occupational Therapy Assistant and Registered Nurse  · After treatment position/precautions:Supine in bed, Bed/Chair-wheels locked, Bed in low position, Call light within reach and Family at bedside  · Compliance with Program/Exercises: compliant all of the time. · Recommendations/Intent for next treatment session: \"Next visit will focus on advancements to more challenging activities and reduction in assistance provided\".   Total Treatment Duration:  OT Patient Time In/Time Out  Time In: 1550  Time Out: 1455 [facility]

## 2017-09-21 NOTE — PROGRESS NOTES
Hospitalist Progress Note    2017  Admit Date: 2017 12:25 PM   NAME: Nain Mcdowell   :  1934   MRN:  [id number]   Attending: Sumeet Pereira MD  PCP:  Kiran Li MD    SUBJECTIVE:     Nain Mcdowell is a 80years old male with pmhx of CAD, CABG, paroxysmal A.fib, HTN, dyslipidemia admitted on  with left sided weakness with CT evidence of right MCA infarct. : seen at bedside. Pt's wife present. \" I feel okay. \"  No chest pain, SOB, coughing, headache, nausea or vomiting. Review of Systems negative with exception of pertinent positives noted above      PHYSICAL EXAM       Visit Vitals    /85 (BP 1 Location: Left arm, BP Patient Position: At rest)    Pulse (!) 59    Temp 97.6 °F (36.4 °C)    Resp 16    Ht 5' 10\" (1.778 m)    Wt 69.4 kg (153 lb)    SpO2 98%    BMI 21.95 kg/m2      Temp (24hrs), Av.5 °F (36.4 °C), Min:97.1 °F (36.2 °C), Max:97.7 °F (36.5 °C)    Oxygen Therapy  O2 Sat (%): 98 % (17 0845)  Pulse via Oximetry: 63 beats per minute (09/15/17 0541)  O2 Device: Room air (09/15/17 0541)  No intake or output data in the 24 hours ending 17 0955       General: No acute distress. HEENT:           Head ATNC, PERRLA+, no pallor or ict, neck suppler, no JVD  Lungs:  CTA Bilaterally. CVS:  Regular rate and rhythm,  No murmur, rub, or gallop, No JVD, No lower   extremity edema. Abdomen: Soft, Non distended, Non tender, Positive bowel sounds. MSK:  No deformities, lesions, Spontaneously moves extremities. Neurologic:  gcs 15, speech clear, left facial droop, flaccid left upper EXT, 4/5 LLE, cerebellar functions intact. Strength 5/5 in RUE/RLE  Psychiatry:      No anxiety/Depression  Skin:   No rash/lesions.  Good skin turgor  Lymphatics:  No LAD    Recent Results (from the past 24 hour(s))   PLEASE READ & DOCUMENT PPD TEST IN 48 HRS    Collection Time: 17  3:00 PM   Result Value Ref Range    PPD 0 0neg Negative    mm Induration 0 0 mm         Imaging /Procedures /Studies   MRI brain w/o contrast:  IMPRESSION: Acute to early subacute large right MCA territory infarct.     CTA head and neck:  IMPRESSION: Diffuse arterial disease with 50% right ICA NASCET stenosis and 99%  NASCET stenosis at the left ICA. Stenosis of both vertebral artery origins also  is demonstrated.     No significant intracranial arterial pathology. ASSESSMENT      Hospital Problems as of 9/21/2017  Never Reviewed          Codes Class Noted - Resolved POA    * (Principal)Stroke ([address] ICD-10-CM: I63.9  ICD-9-CM: 434.91  9/14/2017 - Present Unknown        CAD (coronary artery disease) (Chronic) ICD-10-CM: I25.10  ICD-9-CM: 414.00  10/6/2015 - Present Yes        PAF (paroxysmal atrial fibrillation) (HCC) (Chronic) ICD-10-CM: I48.0  ICD-9-CM: 427.31  10/6/2015 - Present Yes    Overview Signed 4/26/2017 12:59 PM by Sherri Worrell MD     chads vasc 6             Dyslipidemia (Chronic) ICD-10-CM: E78.5  ICD-9-CM: 272.4  10/6/2015 - Present Yes                  Plan:    - Extensive right MCA territory stroke with LUE flaccidity:  CTA head showed 50-69% stenosis of ORTIZ and >98% stenosis of LICA. No urgent surgical intervention. Follow up with vascular surgery clinic in 1 month. Echo showed normal LVEF 55-60% with mildly dilated left atrium, no right to left shunt. - continue start plavix and aspirin, continue lipitor.  - [id number] score of 5, will need OAC, probably 1-2 weeks later. Should follow up with outpatient neuro and cardiology. - continue PT/OT eval  - DVT prophylaxis with lovenox. - will resume norvasc, lisinopril and lopressor. Disposition: accepted by [facility].  Awaiting insurance approval.  Luz Loza MD

## 2017-09-21 NOTE — PROGRESS NOTES
Still awaiting insurance precert before transfer to [facility]. CM will continue to follow for any further needs.

## 2017-09-21 NOTE — PROGRESS NOTES
Problem: Dysphagia (Adult)  Goal: *Acute Goals and Plan of Care (Insert Text)  STG: Pt will consume trials pureed/nectar thick liquids without signs/sx aspiration 100% (goal revised 9/15/17)  STG: Pt will complete oral motor exercises with 80% accuracy. STG: Pt will participate in a ST evaluation x1. LTG: Pt will tolerate least restrictive diet without signs/sx aspiration 100% for safe swallow function. SPEECH LANGUAGE PATHOLOGY: BEDSIDE SWALLOW NOTE: Daily Note 3     NAME/AGE/GENDER: Delmis Dallas is a 80 y.o. male  DATE: 9/21/2017  PRIMARY DIAGNOSIS: Stroke [facility])       ICD-10: Treatment Diagnosis: dysphagia, oropharyngeal 13.12  INTERDISCIPLINARY COLLABORATION: Registered Nurse and Physician  PRECAUTIONS/ALLERGIES: Review of patient's allergies indicates no known allergies. ASSESSMENT:   Patient seen for dysphagia therapy. Pt initially wide awake with eyes open and laughing and joking with his wife and daughter. However, pt became drowsy last 5-10 minutes of session and closed his eyes. He still participated with exercises but needed cues to stay awake. Mod cues to exaggerate movements with vowels. Patient will benefit from skilled intervention to address the below impairments. ?????? ? ? This section established at most recent assessment??????????  PROBLEM LIST (Impairments causing functional limitations):  1. Dysphagia  2. Facial weakness  3. Dysarthria  4. dysfluencies  REHABILITATION POTENTIAL FOR STATED GOALS: GOOD      PLAN OF CARE:   Patient will benefit from skilled intervention to address the following impairments.   RECOMMENDATIONS AND PLANNED INTERVENTIONS (Benefits and precautions of therapy have been discussed with the patient.):  · PO:  Pureed and Liquids:  nectar  MEDICATIONS:  · Crushed in puree  COMPENSATORY STRATEGIES/MODIFICATIONS INCLUDING:  · None  OTHER RECOMMENDATIONS (including follow up treatment recommendations):   · Oral motor exercises  · ST evaluation  · Po trials  RECOMMENDED DIET MODIFICATIONS DISCUSSED WITH:  · Hospitalist  · Nursing  · ER MD  · Patient  FREQUENCY/DURATION: Continue to follow patient 3 times a week for duration of hospital stay to address above goals. RECOMMENDED REHABILITATION/EQUIPMENT: (at time of discharge pending progress): Due to the probability of continued deficits (see above) this patient will likely need continued skilled speech therapy after discharge. SUBJECTIVE:   Pt very alert initially. History of Present Injury/Illness: Mr. Jarocho Portillo  has a past medical history of Arrhythmia; Arthritis; CAD (coronary artery disease) (12/1999); Carotid artery stenosis without cerebral infarction (1/18/2016); Chronic pain; Claudication ([facility] Coronary atherosclerosis of native coronary artery (1/18/2016); GERD (gastroesophageal reflux disease); Hyperlipidemia; Hypertension; Kidney stones; MI (myocardial infarction) ([address] (12/1999); PAF (paroxysmal atrial fibrillation) ([facility] [address] and Thromboembolus ([facility] [address] .   He also  has a past surgical history that includes cardiac surg procedure unlist (12/1999); abdomen surgery proc unlisted (2000?); appendectomy (age 15); heent (1's?); and ptca. Present Symptoms: dysphagia    Pain Intensity 1: 0  Pain Location 1: Head, Neck  Pain Orientation 1: Right  Pain Intervention(s) 1: Ambulation/Increased Activity, Emotional support, Rest, Repositioned, Family Support  Current Medications:   No current facility-administered medications on file prior to encounter. Current Outpatient Prescriptions on File Prior to Encounter   Medication Sig Dispense Refill    naproxen (NAPROSYN) 500 mg tablet TAKE ONE TABLET BY MOUTH ONCE DAILY AS NEEDED 30 Tab 0    metoprolol tartrate (LOPRESSOR) 25 mg tablet Take 0.5 Tabs by mouth every morning. 30 Tab 6    esomeprazole (NEXIUM) 20 mg capsule Take 20 mg by mouth daily.  magnesium 250 mg tab Take 250 mg by mouth daily.       furosemide (LASIX) 40 mg tablet Take 1 Tab by mouth daily. 90 Tab 3    aspirin 81 mg chewable tablet Take 1 Tab by mouth daily.  lisinopril (PRINIVIL, ZESTRIL) 40 mg tablet Take 1 Tab by mouth every morning. 30 Tab 6    nitroglycerin (NITROSTAT) 0.4 mg SL tablet 1 Tab by SubLINGual route every five (5) minutes as needed for Chest Pain. 2 Bottle 4    atorvastatin (LIPITOR) 40 mg tablet Take 40 mg by mouth daily.  tamsulosin (FLOMAX) 0.4 mg capsule Take 0.4 mg by mouth daily.  amlodipine (NORVASC) 5 mg tablet Take 5 mg by mouth every morning.  MULTI-VITAMIN PO Take 1 Tab by mouth every morning.  zinc 50 mg capsule Take 50 mg by mouth every morning.  ascorbic acid (VITAMIN C) 1,000 mg tablet Take 1,000 mg by mouth every morning.  lutein 6 mg Cap Take 1 Tab by mouth every morning.  arginine 500 mg tablet Take 500 mg by mouth every morning.  sildenafil citrate (VIAGRA) 100 mg tablet Take 1 Tab by mouth as needed. 10 Tab 3     Current Dietary Status:  Pureed/nectar                 History of reflux:  NO   · Reflux medication:  Social History/Home Situation: residing with spouse   Home Environment: Private residence  # Steps to Enter: 1  One/Two Story Residence: One story  Living Alone: No  Support Systems: Spouse/Significant Other/Partner  Patient Expects to be Discharged to[de-identified] Unknown  Current DME Used/Available at Home: None  Tub or Shower Type: Tub/Shower combination      OBJECTIVE:   Respiratory Status:        CXR Results: n/a  MRI/CT Results: Probable subacute right MCA CVA. Note: If a subtle CVA is suspected,  MRI would be more definitive if clinically warranted.     Cognitive and Communication Status:  Mental status: alert        Tool Used: Dysphagia Outcome and Severity Scale (JOSÉ)     Score Comments   Normal Diet  [ ] 7 With no strategies or extra time needed   Functional Swallow  [ ] 6 May have mild oral or pharyngeal delay         Mild Dysphagia     [ ] 5 Which may require one diet consistency restricted (those who demonstrate penetration which is entirely cleared on MBS would be included)   Mild-Moderate Dysphagia  [ ] 4 With 1-2 diet consistencies restricted         Moderate Dysphagia  [ ] 3 With 2 or more diet consistencies restricted         Moderately Severe Dysphagia  [ ] 2 With partial PO strategies (trials with ST only)         Severe Dysphagia  [ ] 1 With inability to tolerate any PO safely            Score:  Initial: 2 Most Recent: 3 (Date: 9/20/17 )   Interpretation of Tool: The Dysphagia Outcome and Severity Scale (JOSÉ) is a simple, easy-to-use, 7-point scale developed to systematically rate the functional severity of dysphagia based on objective assessment and make recommendations for diet level, independence level, and type of nutrition. Score 7 6 5 4 3 2 1   Modifier CH CI CJ CK CL CM CN   · Swallowing:               - CURRENT STATUS:           CM - 80%-99% impaired, limited or restricted               - GOAL STATUS:                   CK - 40%-59% impaired, limited or restricted               - D/C STATUS:                       ---------------To be determined---------------  Payor: [facility] / Plan: [place] [facility] MED[facility] / Product Type: Managed Care Medicare /       TREATMENT:               (In addition to Assessment/Re-Assessment sessions the following treatments were rendered)  Dysphagia Activities: Activities/Procedures listed utilized to improve progress in swallow strength and swallow function. Required moderate cueing to improve swallow safety. ORAL MOTOR  EXERCISES:  Exaggerate Vowels: Yes  Reps: 10  Sets : 1           Labial Closure:  Yes  Reps : 10  Sets: 1           Labial Pucker: Yes  Reps: 10  Sets : 1           Labial Retraction: Yes  Reps : 5  Sets : 1  Labial Seal-Cheeks Extended: Yes  Reps : 10  Sets : 1                            Lingual Lateralization-Exterior: Yes  Reps : 10 (decreased ROM to the left )  Sets: 1 LARYNGEAL / PHARYNGEAL EXERCISES:                                                                                                                                      __________________________________________________________________________________________________  Safety:   After treatment position/precautions:  · Upright in Bed  Treatment Assessment:   . Progression/Medical Necessity:   · Skilled intervention continues to be required due to persistent signs and symptoms of aspiration. Compliance with Program/Exercises: Will assess as treatment progresses. Reason for Continuation of Services/Other Comments:  · Patient continues to require skilled intervention due to dysphagia .   Recommendations/Intent for next treatment session: \"Treatment next visit will focus on diet tolerance/oral motor exercises/ST assessment  Total Treatment Duration:  Time In: 1453  Time Out: [place] 19, Manan Magana 43., CCC-SLP

## 2017-09-22 VITALS
WEIGHT: 153 LBS | HEIGHT: 70 IN | DIASTOLIC BLOOD PRESSURE: 66 MMHG | BODY MASS INDEX: 21.9 KG/M2 | TEMPERATURE: 98.2 F | HEART RATE: 79 BPM | RESPIRATION RATE: 16 BRPM | SYSTOLIC BLOOD PRESSURE: 146 MMHG | OXYGEN SATURATION: 93 %

## 2017-09-22 PROCEDURE — 74011250637 HC RX REV CODE- 250/637: Performed by: HOSPITALIST

## 2017-09-22 RX ORDER — OXYCODONE AND ACETAMINOPHEN 5; 325 MG/1; MG/1
1 TABLET ORAL
Qty: 20 TAB | Refills: 0 | Status: SHIPPED | OUTPATIENT
Start: 2017-09-22 | End: 2017-10-23

## 2017-09-22 RX ORDER — AMLODIPINE BESYLATE 10 MG/1
10 TABLET ORAL DAILY
Qty: 30 TAB | Refills: 2 | Status: SHIPPED | OUTPATIENT
Start: 2017-09-22 | End: 2017-10-23

## 2017-09-22 RX ORDER — CLOPIDOGREL BISULFATE 75 MG/1
75 TABLET ORAL DAILY
Qty: 30 TAB | Refills: 2 | Status: SHIPPED | OUTPATIENT
Start: 2017-09-22 | End: 2017-10-23

## 2017-09-22 RX ORDER — TAMSULOSIN HYDROCHLORIDE 0.4 MG/1
0.4 CAPSULE ORAL DAILY
Qty: 30 CAP | Refills: 2 | Status: SHIPPED | OUTPATIENT
Start: 2017-09-22 | End: 2017-10-23

## 2017-09-22 RX ORDER — FUROSEMIDE 40 MG/1
40 TABLET ORAL DAILY
Qty: 30 TAB | Refills: 2 | Status: SHIPPED | OUTPATIENT
Start: 2017-09-22 | End: 2017-10-23

## 2017-09-22 RX ORDER — GUAIFENESIN 100 MG/5ML
81 LIQUID (ML) ORAL DAILY
Qty: 30 TAB | Refills: 2 | Status: SHIPPED | OUTPATIENT
Start: 2017-09-22 | End: 2017-10-23

## 2017-09-22 RX ORDER — PANTOPRAZOLE SODIUM 40 MG/1
40 TABLET, DELAYED RELEASE ORAL
Qty: 30 TAB | Refills: 2 | Status: SHIPPED | OUTPATIENT
Start: 2017-09-22 | End: 2017-10-23

## 2017-09-22 RX ORDER — ATORVASTATIN CALCIUM 80 MG/1
80 TABLET, FILM COATED ORAL DAILY
Qty: 30 TAB | Refills: 2 | Status: SHIPPED | OUTPATIENT
Start: 2017-09-22 | End: 2017-10-23

## 2017-09-22 RX ORDER — LISINOPRIL 40 MG/1
40 TABLET ORAL DAILY
Qty: 30 TAB | Refills: 2 | Status: SHIPPED | OUTPATIENT
Start: 2017-09-22 | End: 2017-10-23

## 2017-09-22 RX ADMIN — ATORVASTATIN CALCIUM 80 MG: 40 TABLET, FILM COATED ORAL at 10:06

## 2017-09-22 RX ADMIN — CLOPIDOGREL BISULFATE 75 MG: 75 TABLET ORAL at 10:06

## 2017-09-22 RX ADMIN — FUROSEMIDE 40 MG: 40 TABLET ORAL at 10:06

## 2017-09-22 RX ADMIN — AMLODIPINE BESYLATE 10 MG: 10 TABLET ORAL at 10:06

## 2017-09-22 RX ADMIN — CLONIDINE HYDROCHLORIDE 0.2 MG: 0.2 TABLET ORAL at 10:59

## 2017-09-22 RX ADMIN — METOPROLOL TARTRATE 50 MG: 50 TABLET ORAL at 10:20

## 2017-09-22 RX ADMIN — TAMSULOSIN HYDROCHLORIDE 0.4 MG: 0.4 CAPSULE ORAL at 10:06

## 2017-09-22 RX ADMIN — LISINOPRIL 40 MG: 20 TABLET ORAL at 10:06

## 2017-09-22 RX ADMIN — POTASSIUM CHLORIDE 40 MEQ: 20 TABLET, EXTENDED RELEASE ORAL at 10:06

## 2017-09-22 RX ADMIN — ASPIRIN 81 MG 81 MG: 81 TABLET ORAL at 10:07

## 2017-09-22 NOTE — DISCHARGE SUMMARY
Hospitalist Discharge Summary     Patient ID:  Chuck Rae  [id number]  75 y.o.  1934  Admit date: 9/14/2017 12:25 PM  Discharge date and time: 9/22/2017  Attending: Sylvia Easley MD  PCP:  Solange Harris MD  Treatment Team: Attending Provider: Sylvia Easley MD; Consulting Provider: Gretchen Baker MD; [place] Manager[place] [facility] [place]: Ashleigh Dean    Principal Diagnosis   Right MCA Stroke [facility])       Principal Problem:    Stroke [facility]) (9/14/2017)    Active Problems:    CAD (coronary artery disease) (10/6/2015)      PAF (paroxysmal atrial fibrillation) ([facility] [address] (10/6/2015)      Overview: chads vasc 6      Dyslipidemia (10/6/2015)             Hospital Course:  Please refer to the admission H&P for details of presentation. In summary, the patient is 80years old male with pmhx of CAD, CABG, paroxysmal A.fib, HTN, dyslipidemia admitted on 9/14 with left sided weakness with CT evidence of right MCA infarct. Right sided infarct was confirmed on MRI brain. Pt also had CTA head and neck, showed diffuse arterial disease with 50% right ICA stenosis and 99% of left ICA stenosis. For CTA finding, vascular surgery was consulted, they recommended no urgent surgical intervention, but recommended to follow up with Vascular clinic in 1 month. Tele neurology consulted, recommended to continue both ASA and plavix along with high intensity statin. Given [id number] score of 5, pt will need oral anticoagulation 2 weeks after the discharge. Risk of bleeding has been explained to the family, and at this time, benefits outweighs the risks. Will discharge on eliquis to be started 2 weeks later. Antihypertensive medications were adjusted to achieve optimal blood pressure control after initial 24 hours of permissive hypertension. PT/OT evaluated the patient and recommended for STR for left upper extremity paralysis.  During the hospital course, pt's speech, weakness in left lower extremity, has improved significantly. Speech pathology evaluated the patient and cleared him for pureed nectar diet. Rest of the hospital course was uneventful. Significant Diagnostic Studies:   Echo:  SUMMARY:    -  Left ventricle: Systolic function was normal. Ejection fraction was  estimated in the range of 55 % to 60 %. There were no regional wall motion  abnormalities. Wall thickness was increased. -  Right ventricle: The ventricle was dilated. -  Left atrium: The atrium was mildly dilated. -  Atrial septum: Contrast injection was performed. There was no   right-to-left  shunt, with provocative maneuvers to increase right atrial pressure. -  Aortic valve: The aortic valve area by the continuity equation was 148   cm2. The findings were most consistent with mild aortic stenosis. -  Mitral valve: There was mild regurgitation. CTA head and neck w and w/o contrast:  IMPRESSION: Diffuse arterial disease with 50% right ICA NASCET stenosis and 99%  NASCET stenosis at the left ICA. Stenosis of both vertebral artery origins also  is demonstrated.     No significant intracranial arterial pathology. MRI brain:  IMPRESSION: Acute to early subacute large right MCA territory infarct.     Carotid doppler:  IMPRESSION:  Bilateral atherosclerosis formation.     50-69% stenosis of the left internal carotid artery. Labs: Results:       Chemistry Recent Labs      09/20/17   0531   GLU  124*   NA  145   K  3.5   CL  113*   CO2  22   BUN  30*   CREA  1.09   CA  8.2*   AGAP  10      CBC w/Diff Recent Labs      09/20/17   0531   WBC  7.7   RBC  4.46   HGB  14.6   HCT  41.5   PLT  180      Cardiac Enzymes No results for input(s): CPK, CKND1, NAEL in the last 72 hours. No lab exists for component: CKRMB, TROIP   Coagulation No results for input(s): PTP, INR, APTT in the last 72 hours.     No lab exists for component: INREXT    Lipid Panel Lab Results   Component Value Date/Time    Cholesterol, total 156 09/15/2017 04:58 AM    HDL Cholesterol 72 09/15/2017 04:58 AM    LDL, calculated 62.8 09/15/2017 04:58 AM    VLDL, calculated 21.2 09/15/2017 04:58 AM    Triglyceride 106 09/15/2017 04:58 AM    CHOL/HDL Ratio 2.2 09/15/2017 04:58 AM      BNP No results for input(s): BNPP in the last 72 hours. Liver Enzymes No results for input(s): TP, ALB, TBIL, AP, SGOT, GPT in the last 72 hours. No lab exists for component: DBIL   Thyroid Studies Lab Results   Component Value Date/Time    TSH 1.580 06/01/2016 02:41 PM            Discharge Exam:  Visit Vitals    /66 (BP 1 Location: Left arm, BP Patient Position: At rest)    Pulse 79    Temp 98.2 °F (36.8 °C)    Resp 16    Ht 5' 10\" (1.778 m)    Wt 69.4 kg (153 lb)    SpO2 93%    BMI 21.95 kg/m2     General:                    No acute distress. HEENT:           Head ATNC, PERRLA+, no pallor or ict, neck suppler, no JVD  Lungs:                                 CTA Bilaterally. CVS:                                    Regular rate and rhythm,  No murmur, rub, or gallop, No JVD, No lower                                     extremity edema. Abdomen:                  Soft, Non distended, Non tender, Positive bowel sounds. MSK:                                   No deformities, lesions, Spontaneously moves extremities. Neurologic:                gcs 15, speech clear, left facial droop, flaccid left upper EXT, 4/5 LLE, cerebellar functions intact. Strength 5/5 in RUE/RLE  Psychiatry:      No anxiety/Depression  Skin:                                    No rash/lesions. Good skin turgor  Lymphatics:  No LAD      Disposition: STR  Discharge Condition: stable  Patient Instructions:   Current Discharge Medication List      START taking these medications    Details   pantoprazole (PROTONIX) 40 mg tablet Take 1 Tab by mouth Daily (before breakfast) for 30 days. Qty: 30 Tab, Refills: 2      clopidogrel (PLAVIX) 75 mg tab Take 1 Tab by mouth daily for 30 days.   Qty: 30 Tab, Refills: 2      apixaban (ELIQUIS) 5 mg tablet Take 1 Tab by mouth two (2) times a day for 30 days. Indications: Please start eliquis 2 weeks from discharge. Qty: 60 Tab, Refills: 1      oxyCODONE-acetaminophen (PERCOCET) 5-325 mg per tablet Take 1 Tab by mouth every six (6) hours as needed for Pain. Max Daily Amount: 4 Tabs. Qty: 20 Tab, Refills: 0         CONTINUE these medications which have CHANGED    Details   lisinopril (PRINIVIL, ZESTRIL) 40 mg tablet Take 1 Tab by mouth daily for 30 days. Qty: 30 Tab, Refills: 2      tamsulosin (FLOMAX) 0.4 mg capsule Take 1 Cap by mouth daily for 30 days. Qty: 30 Cap, Refills: 2      furosemide (LASIX) 40 mg tablet Take 1 Tab by mouth daily for 30 days. Qty: 30 Tab, Refills: 2      aspirin 81 mg chewable tablet Take 1 Tab by mouth daily for 30 days. Qty: 30 Tab, Refills: 2      amLODIPine (NORVASC) 10 mg tablet Take 1 Tab by mouth daily for 30 days. Qty: 30 Tab, Refills: 2      atorvastatin (LIPITOR) 80 mg tablet Take 1 Tab by mouth daily for 30 days. Qty: 30 Tab, Refills: 2         CONTINUE these medications which have NOT CHANGED    Details   potassium 99 mg tablet Take 99 mg by mouth daily. OMEGA-3/DHA/EPA/FISH OIL (OMEGA-3 PO) Take 1,200 mg by mouth daily. cholecalciferol, vitamin D3, (VITAMIN D3) 2,000 unit tab Take 1 Tab by mouth daily. GLUC CAMPOS/CHONDRO CAMPOS A/VIT C/MN (GLUCOSAMINE 1500 COMPLEX PO) Take 1 Tab by mouth daily. esomeprazole (NEXIUM) 20 mg capsule Take 20 mg by mouth daily. magnesium 250 mg tab Take 250 mg by mouth daily. nitroglycerin (NITROSTAT) 0.4 mg SL tablet 1 Tab by SubLINGual route every five (5) minutes as needed for Chest Pain. Qty: 2 Bottle, Refills: 4      zinc 50 mg capsule Take 50 mg by mouth every morning. sildenafil citrate (VIAGRA) 100 mg tablet Take 1 Tab by mouth as needed.   Qty: 10 Tab, Refills: 3         STOP taking these medications       SAW PALMETTO PO Comments:   Reason for Stopping: naproxen (NAPROSYN) 500 mg tablet Comments:   Reason for Stopping:         metoprolol tartrate (LOPRESSOR) 25 mg tablet Comments:   Reason for Stopping:         MULTI-VITAMIN PO Comments:   Reason for Stopping:         ascorbic acid (VITAMIN C) 1,000 mg tablet Comments:   Reason for Stopping:         lutein 6 mg Cap Comments:   Reason for Stopping:         arginine 500 mg tablet Comments:   Reason for Stopping:         saw palmetto 160 mg Cap Comments:   Reason for Stopping:         GLUCOSAM & CHONDROIT-MV & MIN3 (GLUCOSAMINE &CHONDROIT-MV-MIN3 PO) Comments:   Reason for Stopping:         omega-3 fatty acids 1,000 mg Cap Comments:   Reason for Stopping:               Activity: PT/OT Eval and Treat  Diet: Cardiac pureed nectar consistency  Wound Care: None needed    Follow-up  ·   Follow up with Neurology and Cardiology in 2 weeks. · Follow up with Vascular in 1 month. · Follow up with PCP in 10 days.   Time spent to discharge patient 35 minutes  Signed:  Angelina Valles MD  9/22/2017  10:23 AM

## 2017-10-30 LAB — INR BLD: NORMAL (ref 11–14)

## 2017-11-02 ENCOUNTER — HOME HEALTH ADMISSION (OUTPATIENT)
Dept: HOME HEALTH SERVICES | Facility: HOME HEALTH | Age: 82
End: 2017-11-02

## 2017-12-16 ENCOUNTER — APPOINTMENT (OUTPATIENT)
Dept: GENERAL RADIOLOGY | Age: 82
End: 2017-12-16
Attending: EMERGENCY MEDICINE
Payer: MEDICARE

## 2017-12-16 ENCOUNTER — HOSPITAL ENCOUNTER (EMERGENCY)
Age: 82
Discharge: HOME | End: 2017-12-17
Attending: EMERGENCY MEDICINE
Payer: MEDICARE

## 2017-12-16 DIAGNOSIS — E87.5 ACUTE HYPERKALEMIA: ICD-10-CM

## 2017-12-16 DIAGNOSIS — R53.1 GENERALIZED WEAKNESS: ICD-10-CM

## 2017-12-16 DIAGNOSIS — N17.9 AKI (ACUTE KIDNEY INJURY) (HCC): ICD-10-CM

## 2017-12-16 DIAGNOSIS — R55 VASOVAGAL SYNCOPE: Primary | ICD-10-CM

## 2017-12-16 DIAGNOSIS — D69.6 THROMBOCYTOPENIA (HCC): ICD-10-CM

## 2017-12-16 LAB
ALBUMIN SERPL-MCNC: 3.3 G/DL (ref 3.2–4.6)
ALBUMIN/GLOB SERPL: 1.1 {RATIO} (ref 1.2–3.5)
ALP SERPL-CCNC: 74 U/L (ref 50–136)
ALT SERPL-CCNC: 25 U/L (ref 12–65)
ANION GAP SERPL CALC-SCNC: 9 MMOL/L (ref 7–16)
AST SERPL-CCNC: 20 U/L (ref 15–37)
BASOPHILS # BLD: 0 K/UL (ref 0–0.2)
BASOPHILS NFR BLD: 0 % (ref 0–2)
BILIRUB SERPL-MCNC: 0.2 MG/DL (ref 0.2–1.1)
BUN SERPL-MCNC: 52 MG/DL (ref 8–23)
CALCIUM SERPL-MCNC: 9 MG/DL (ref 8.3–10.4)
CHLORIDE SERPL-SCNC: 115 MMOL/L (ref 98–107)
CO2 SERPL-SCNC: 21 MMOL/L (ref 21–32)
CREAT SERPL-MCNC: 2.26 MG/DL (ref 0.8–1.5)
DIFFERENTIAL METHOD BLD: ABNORMAL
EOSINOPHIL # BLD: 0.1 K/UL (ref 0–0.8)
EOSINOPHIL NFR BLD: 1 % (ref 0.5–7.8)
ERYTHROCYTE [DISTWIDTH] IN BLOOD BY AUTOMATED COUNT: 15 % (ref 11.9–14.6)
GLOBULIN SER CALC-MCNC: 3.1 G/DL (ref 2.3–3.5)
GLUCOSE SERPL-MCNC: 171 MG/DL (ref 65–100)
HCT VFR BLD AUTO: 32.3 % (ref 41.1–50.3)
HGB BLD-MCNC: 10.2 G/DL (ref 13.6–17.2)
IMM GRANULOCYTES # BLD: 0 K/UL (ref 0–0.5)
IMM GRANULOCYTES NFR BLD AUTO: 0 % (ref 0–5)
LYMPHOCYTES # BLD: 0.9 K/UL (ref 0.5–4.6)
LYMPHOCYTES NFR BLD: 11 % (ref 13–44)
MCH RBC QN AUTO: 31.8 PG (ref 26.1–32.9)
MCHC RBC AUTO-ENTMCNC: 31.6 G/DL (ref 31.4–35)
MCV RBC AUTO: 100.6 FL (ref 79.6–97.8)
MONOCYTES # BLD: 0.5 K/UL (ref 0.1–1.3)
MONOCYTES NFR BLD: 6 % (ref 4–12)
NEUTS SEG # BLD: 6.8 K/UL (ref 1.7–8.2)
NEUTS SEG NFR BLD: 82 % (ref 43–78)
PLATELET # BLD AUTO: 91 K/UL (ref 150–450)
PMV BLD AUTO: 12 FL (ref 10.8–14.1)
POTASSIUM SERPL-SCNC: 5.3 MMOL/L (ref 3.5–5.1)
PROT SERPL-MCNC: 6.4 G/DL (ref 6.3–8.2)
RBC # BLD AUTO: 3.21 M/UL (ref 4.23–5.67)
SODIUM SERPL-SCNC: 145 MMOL/L (ref 136–145)
TROPONIN I SERPL-MCNC: <0.02 NG/ML (ref 0.02–0.05)
WBC # BLD AUTO: 8.3 K/UL (ref 4.3–11.1)

## 2017-12-16 PROCEDURE — 93005 ELECTROCARDIOGRAM TRACING: CPT | Performed by: EMERGENCY MEDICINE

## 2017-12-16 PROCEDURE — 84484 ASSAY OF TROPONIN QUANT: CPT | Performed by: EMERGENCY MEDICINE

## 2017-12-16 PROCEDURE — 80053 COMPREHEN METABOLIC PANEL: CPT | Performed by: EMERGENCY MEDICINE

## 2017-12-16 PROCEDURE — 71020 XR CHEST PA LAT: CPT

## 2017-12-16 PROCEDURE — 96361 HYDRATE IV INFUSION ADD-ON: CPT | Performed by: EMERGENCY MEDICINE

## 2017-12-16 PROCEDURE — 85025 COMPLETE CBC W/AUTO DIFF WBC: CPT | Performed by: EMERGENCY MEDICINE

## 2017-12-16 PROCEDURE — 99285 EMERGENCY DEPT VISIT HI MDM: CPT | Performed by: EMERGENCY MEDICINE

## 2017-12-16 PROCEDURE — 71010 XR CHEST SNGL V: CPT

## 2017-12-16 PROCEDURE — 74011250636 HC RX REV CODE- 250/636: Performed by: EMERGENCY MEDICINE

## 2017-12-16 RX ADMIN — SODIUM CHLORIDE 1000 ML: 900 INJECTION, SOLUTION INTRAVENOUS at 23:39

## 2017-12-17 VITALS
TEMPERATURE: 97.2 F | HEART RATE: 80 BPM | BODY MASS INDEX: 21.9 KG/M2 | OXYGEN SATURATION: 98 % | DIASTOLIC BLOOD PRESSURE: 61 MMHG | RESPIRATION RATE: 18 BRPM | SYSTOLIC BLOOD PRESSURE: 138 MMHG | WEIGHT: 153 LBS | HEIGHT: 70 IN

## 2017-12-17 LAB
ATRIAL RATE: 340 BPM
CALCULATED R AXIS, ECG10: 90 DEGREES
CALCULATED T AXIS, ECG11: 61 DEGREES
DIAGNOSIS, 93000: NORMAL
Q-T INTERVAL, ECG07: 396 MS
QRS DURATION, ECG06: 84 MS
QTC CALCULATION (BEZET), ECG08: 408 MS
VENTRICULAR RATE, ECG03: 64 BPM

## 2017-12-17 PROCEDURE — 74011000258 HC RX REV CODE- 258: Performed by: EMERGENCY MEDICINE

## 2017-12-17 PROCEDURE — 74011250636 HC RX REV CODE- 250/636: Performed by: EMERGENCY MEDICINE

## 2017-12-17 PROCEDURE — 96365 THER/PROPH/DIAG IV INF INIT: CPT | Performed by: EMERGENCY MEDICINE

## 2017-12-17 RX ORDER — CALCIUM GLUCONATE 94 MG/ML
1 INJECTION, SOLUTION INTRAVENOUS
Status: DISCONTINUED | OUTPATIENT
Start: 2017-12-17 | End: 2017-12-17 | Stop reason: SDUPTHER

## 2017-12-17 RX ADMIN — CALCIUM GLUCONATE 1 G: 94 INJECTION, SOLUTION INTRAVENOUS at 01:15

## 2017-12-17 NOTE — DISCHARGE INSTRUCTIONS
Thrombocytopenia: Care Instructions  Your Care Instructions    Thrombocytopenia is a low number of platelets in the blood. Platelets are the cells that help blood clot. If you don't have enough of them, your blood cannot clot well. So it is harder to stop bleeding. You may have low platelets because your bone marrow does not make them. Or your body's defenses (immune system) may destroy them. Having an enlarged spleen can also reduce the number of platelets in your blood. This is because they can get trapped in the enlarged spleen. Some diseases or medicines may also cause low platelets. But platelets may go back to normal levels if the disease is treated or the medicine is stopped. You may not need treatment if your problem is mild. If you do need treatment, you may have platelets added to your blood. Or you may get medicine to stop the loss of platelets or help your body make them. Follow-up care is a key part of your treatment and safety. Be sure to make and go to all appointments, and call your doctor if you are having problems. It's also a good idea to know your test results and keep a list of the medicines you take. How can you care for yourself at home? · Be safe with medicines. Take your medicines exactly as prescribed. Call your doctor if you think you are having a problem with your medicine. · Do not take aspirin or anti-inflammatory medicines unless your doctor says it is okay. Examples are ibuprofen (Advil, Motrin) and naproxen (Aleve). They may increase the risk of bleeding. · Avoid contact sports or activities that could cause you to fall. When should you call for help? Call 911 anytime you think you may need emergency care. For example, call if:  ? · You passed out (lost consciousness). ? · You have signs of severe bleeding, which includes:  ¨ You have a severe headache that is different from past headaches. ¨ You vomit blood or what looks like coffee grounds.   ¨ Your stools are maroon or very bloody. ?Call your doctor now or seek immediate medical care if:  ? · You are dizzy or lightheaded, or you feel like you may faint. ? · You have abnormal bleeding, such as:  ¨ Your stools are black and look like tar, or they have streaks of blood. ¨ You have blood in your urine. ¨ You have joint pain. ¨ You have bruises or blood spots under your skin. ? Watch closely for changes in your health, and be sure to contact your doctor if:  ? · You do not get better as expected. Where can you learn more? Go to http://shea-[place].info/. Enter Y504 in the search box to learn more about \"Thrombocytopenia: Care Instructions. \"  Current as of: October 13, 2016  Content Version: 11.4  © 1404-2233 [employer]. Care instructions adapted under license by [employer] (which disclaims liability or warranty for this information). If you have questions about a medical condition or this instruction, always ask your healthcare professional. [place] [address] any warranty or liability for your use of this information. Hyperkalemia: Care Instructions  Your Care Instructions    Hyperkalemia is too much potassium in the blood. Potassium helps keep the right mix of fluids in your body. It also helps your nerves and muscles work as they should. And it keeps your heartbeat in a normal rhythm. Some things can raise potassium levels. These include some health problems, medicines, and kidney problems. (Normally, your kidneys remove extra potassium.)  Too much potassium can cause nausea. It also can cause a heartbeat that isn't normal. But you may not have any symptoms. Too much potassium can be dangerous. That's why it's important to treat it. If you are taking any of the medicines that can raise your levels, your doctor will ask you to stop. You may get medicines to lower your levels.  And you may have to limit or not eat foods that have a lot of potassium. Follow-up care is a key part of your treatment and safety. Be sure to make and go to all appointments, and call your doctor if you are having problems. It's also a good idea to know your test results and keep a list of the medicines you take. How can you care for yourself at home? · Take your medicines exactly as prescribed. Call your doctor if you think you are having a problem with your medicine. · Stop taking certain medicines if your doctor asks you to. They may be causing your high potassium levels. If you have concerns about stopping medicine, talk with your doctor. · If you have kidney, heart, or liver disease and have to limit fluids, talk with your doctor before you increase the amount of fluids you drink. If the doctor says it's okay, drink plenty of fluids. This means drinking enough so that your urine is light yellow or clear like water. · Avoid strenuous exercise until your doctor tells you it is okay. · Potassium is in many foods, including vegetables, fruits, and milk products. Foods high in potassium include bananas, cantaloupe, broccoli, milk, potatoes, and tomatoes. · Low potassium foods include blueberries, raspberries, cucumber, white or brown rice, spaghetti, and macaroni. · Do not use a salt substitute without talking to your doctor first. Most of these are very high in potassium. · Be sure to tell your doctor about any prescription, over-the-counter, or herbal medicines you take. Some of these can raise potassium. When should you call for help? Call 911 anytime you think you may need emergency care. For example, call if:  ? · You passed out (lost consciousness). ? · You have an unusual heartbeat. Your heart may beat fast or skip beats. ?Call your doctor now or seek immediate medical care if:  ? · You have muscle aches. ? · You feel very weak. ? Watch closely for changes in your health, and be sure to contact your doctor if:  ? · You do not get better as expected. Where can you learn more? Go to http://shea-[place].info/. Enter U942 in the search box to learn more about \"Hyperkalemia: Care Instructions. \"  Current as of: May 12, 2017  Content Version: 11.4  © 9114-3889 [employer]. Care instructions adapted under license by [employer] (which disclaims liability or warranty for this information). If you have questions about a medical condition or this instruction, always ask your healthcare professional. [address] any warranty or liability for your use of this information. Vasovagal Syncope: Care Instructions  Your Care Instructions    Vasovagal syncope (say \"swu-uvc-WIN-gul MYIA-xtf-[id number]\")is sudden dizziness or fainting that can be set off by things such as pain, stress, fear, or trauma. You may sweat or feel lightheaded, sick to your stomach, or tingly. The problem causes the heart rate to slow and the blood vessels to widen, or dilate, for a short time. When this happens, blood pools in the lower body, and less blood goes to the brain. You can usually get relief by lying down with your legs raised (elevated). This helps more blood to flow to your brain and may help relieve symptoms like feeling dizzy. Some doctors may recommend a technique that involves tensing your fists and arms. This type of fainting is often easy to predict. For example, it happens to some people when they see blood or have to get a shot. They may feel symptoms before they faint. An episode of vasovagal syncope usually responds well to self-care. Other treatment often isn't needed. But if the fainting keeps happening, your doctor may suggest further treatments. Follow-up care is a key part of your treatment and safety. Be sure to make and go to all appointments, and call your doctor if you are having problems. It's also a good idea to know your test results and keep a list of the medicines you take.   How can you care for yourself at home? · Drink plenty of fluids to prevent dehydration. If you have kidney, heart, or liver disease and have to limit fluids, talk with your doctor before you increase your fluid intake. · Try to avoid things that you think may set off vasovagal syncope. · Talk to your doctor about any medicines you take. Some medicines may increase the chance of this condition occurring. · If you feel symptoms, lie down with your legs raised. Talk to your doctor about what to do if your symptoms come back. When should you call for help? Call 911 anytime you think you may need emergency care. For example, call if:  ? · You have symptoms of a heart problem. These may include:  ¨ Chest pain or pressure. ¨ Severe trouble breathing. ¨ A fast or irregular heartbeat. ? Watch closely for changes in your health, and be sure to contact your doctor if:  ? · You have more episodes of fainting at home. ? · You do not get better as expected. Where can you learn more? Go to http://shea-[place].info/. Enter L754 in the search box to learn more about \"Vasovagal Syncope: Care Instructions. \"  Current as of: March 20, 2017  Content Version: 11.4  © 4969-9446 [employer]. Care instructions adapted under license by [employer] (which disclaims liability or warranty for this information). If you have questions about a medical condition or this instruction, always ask your healthcare professional. [place] [address] any warranty or liability for your use of this information. Weakness: Care Instructions  Your Care Instructions    Weakness is a lack of physical or muscle strength. You may feel that you need to make extra effort to move your arms, legs, or other muscles. Generalized weakness means that you feel weak in most areas of your body. Another type of weakness may affect just one muscle or group of muscles.   You may feel weak and tired after you have done too much activity, such as taking an extra-long hike. This is not a serious problem. It often goes away on its own. Feeling weak can also be caused by medical conditions like thyroid problems, depression, or a virus. Sometimes the cause can be serious. Your doctor may want to do more tests to try to find the cause of the weakness. The doctor has checked you carefully, but problems can develop later. If you notice any problems or new symptoms, get medical treatment right away. Follow-up care is a key part of your treatment and safety. Be sure to make and go to all appointments, and call your doctor if you are having problems. It's also a good idea to know your test results and keep a list of the medicines you take. How can you care for yourself at home? · Rest when you feel tired. · Be safe with medicines. If your doctor prescribed medicine, take it exactly as prescribed. Call your doctor if you think you are having a problem with your medicine. You will get more details on the specific medicines your doctor prescribes. · Do not skip meals. Eating a balanced diet may increase your energy level. · Get some physical activity every day, but do not get too tired. When should you call for help? Call your doctor now or seek immediate medical care if:  ? · You have new or worse weakness. ? · You are dizzy or lightheaded, or you feel like you may faint. ? Watch closely for changes in your health, and be sure to contact your doctor if:  ? · You do not get better as expected. Where can you learn more? Go to http://shea-[place].info/. Enter [id number] [id number] [id number] in the search box to learn more about \"Weakness: Care Instructions. \"  Current as of: March 20, 2017  Content Version: 11.4  © 0859-6898 [employer]. Care instructions adapted under license by [employer] (which disclaims liability or warranty for this information).  If you have questions about a medical condition or this instruction, always ask your healthcare professional. [place] [address] any warranty or liability for your use of this information.

## 2017-12-17 NOTE — ED NOTES
PT arrived to ED c/o a syncopal while using the bathroom. FD had an intial BP of 60/30. EMS states they had an initial BP of 130/68.

## 2017-12-17 NOTE — ED PROVIDER NOTES
HPI Comments: 79 yo M w/ PMHx of CVA, CAD, HTN, GERD who presents s/p near syncopal episode earlier this evening while using restroom. Fire department noted BP on arrival to be 60/30. EMS states they had an initial BP of 130/68. Denies preceding chest pain, dizziness, shortness of breath, focal weakness, numbness, tingling, nausea, vomiting. Denies hitting his head or loss of consciousness. Wife states that his creatinine 1-2 weeks ago was 2.9. States that he was recently started on Plavix since currently just on Eliquis. Patient currently without any complaints at this time. Patient is a 80 y.o. male presenting with syncope. No  was used. Syncope    This is a new problem. The current episode started 1 to 2 hours ago. The problem occurs constantly. The problem has not changed since onset. He lost consciousness for a period of less than one minute. The problem is associated with nothing. Pertinent negatives include no chest pain, no palpitations, no clumsiness, no confusion, no fever, no abdominal pain, no nausea, no vomiting, no congestion, no headaches, no back pain, no dizziness, no focal weakness, no light-headedness, no seizures, no slurred speech, no weakness, no anal bleeding and no head injury. He has tried nothing for the symptoms. His past medical history is significant for CVA and syncope.         Past Medical History:   Diagnosis Date    Arrhythmia     reason for current procedure    Arthritis     osteo    CAD (coronary artery disease) 12/1999    CABG no stents    Carotid artery stenosis without cerebral infarction 1/18/2016    Chronic pain     arthritic    Claudication [facility])     Coronary atherosclerosis of native coronary artery 1/18/2016    GERD (gastroesophageal reflux disease)     controlled with meds    Hyperlipidemia     Hypertension     controlled with meds    Kidney stones     hx of    MI (myocardial infarction) 12/1999    [facility] [address]     Stroke ([facility] [address]     Thromboembolus [facility])        Past Surgical History:   Procedure Laterality Date    ABDOMEN SURGERY PROC UNLISTED  2000? hernia repair    CABG, ARTERY-VEIN, THREE  12/1999    CABG no stents    HX APPENDECTOMY  age 15    HX HEENT  12's?    left cataract    HX PTCA           Family History:   Problem Relation Age of Onset    Coronary Artery Disease Other      family history       Social History     Social History    Marital status:      Spouse name: N/A    Number of children: N/A    Years of education: N/A     Occupational History    Not on file. Social History Main Topics    Smoking status: Former Smoker     Packs/day: 1.50     Years: 35.00     Quit date: 10/1/1986    Smokeless tobacco: Never Used      Comment: quit 1980's    Alcohol use 4.0 oz/week     7 Glasses of wine, 1 Shots of liquor per week      Comment: minimal    Drug use: No    Sexual activity: Not on file     Other Topics Concern    Not on file     Social History Narrative         ALLERGIES: Review of patient's allergies indicates no known allergies. Review of Systems   Constitutional: Negative for chills and fever. HENT: Negative for congestion, facial swelling and sore throat. Respiratory: Negative for cough and shortness of breath. Cardiovascular: Positive for syncope. Negative for chest pain, palpitations and leg swelling. Gastrointestinal: Negative for abdominal pain, anal bleeding, constipation, nausea and vomiting. Genitourinary: Negative for dysuria and hematuria. Musculoskeletal: Negative for back pain, joint swelling, myalgias and neck pain. Skin: Negative for rash and wound. Neurological: Positive for syncope. Negative for dizziness, focal weakness, seizures, weakness, light-headedness, numbness and headaches. Psychiatric/Behavioral: Negative for confusion.        Vitals:    12/16/17 2135   BP: 120/55   Pulse: 64   Resp: 18   Temp: 97.2 °F (36.2 °C) Weight: 69.4 kg (153 lb)   Height: 5' 10\" (1.778 m)            Physical Exam   Constitutional: He is oriented to person, place, and time. He appears well-developed and well-nourished. HENT:   Head: Normocephalic. Mouth/Throat: Oropharynx is clear and moist. No oropharyngeal exudate. Eyes: Conjunctivae and EOM are normal. Pupils are equal, round, and reactive to light. Neck: Normal range of motion. No JVD present. No tracheal deviation present. Cardiovascular: Normal rate, regular rhythm, normal heart sounds and intact distal pulses. No murmur heard. Pulmonary/Chest: Effort normal and breath sounds normal. No respiratory distress. He has no wheezes. He has no rales. He exhibits no tenderness. Abdominal: Soft. He exhibits no distension. There is no tenderness. There is no rebound. Musculoskeletal: Normal range of motion. He exhibits no edema, tenderness or deformity. Neurological: He is alert and oriented to person, place, and time. No cranial nerve deficit. Coordination normal.   No dysarthria. No facial droop. Patient with residual left-sided weakness. No new focal weakness noted. Skin: Skin is warm and dry. Nursing note and vitals reviewed.        MDM  Number of Diagnoses or Management Options  Acute hyperkalemia:   TERRY (acute kidney injury) ([facility] [address]): new and requires workup  Generalized weakness: new and requires workup  Thrombocytopenia ([facility] [address]): new and requires workup  Vasovagal syncope: new and requires workup     Amount and/or Complexity of Data Reviewed  Clinical lab tests: ordered and reviewed  Tests in the medicine section of CPT®: ordered and reviewed  Review and summarize past medical records: yes  Independent visualization of images, tracings, or specimens: yes    Risk of Complications, Morbidity, and/or Mortality  Presenting problems: moderate  Diagnostic procedures: moderate  Management options: moderate    Patient Progress  Patient progress: stable    ED Course   Comment By Time XR IMPRESSION: 1. An apparent pleural line in the right upper thorax, likely artifactual due to  skinfold. Small pneumothorax not entirely excluded. Consider obtaining PA chest x-ray to better evaluate. Niles Monaco MD 12/16 6180   CXR IMPRESSION: No evidence of acute pulmonary disease. No pneumothorax. Niles Monaco MD 12/17 6312   Wife states that at rehabilitation Cr 2.9 at time of discharge. Cr currently 2.26 with improvement. K slightly elevated at 5.3. Will treat w/ Calcium Gluconate. Niles Monaco MD 12/17 0107   Orthostatics normal limits. Piotr Aburto MD 12/17 0111   Hospitalist consulted. Piotr Aburto MD 12/17 0163   Hospitalist state no need for admission as patient has already received IVF hydration in ED. State that patient needs to follow-up w/ Cards as they are the ones who started anticoagulation. Patient family state they're ready for discharge home. Patient states that his symptoms have resolved and he feels good enough for discharge. Offered admission but states they're ready for discharge. Patient with capacity to make own medical medical decisions.  Piotr Aburto MD 12/17 0139       EKG  Date/Time: 12/16/2017 10:18 PM  Performed by: Kayden Ohara  Authorized by: Armen Dias     ECG reviewed by ED Physician in the absence of a cardiologist: yes    Rate:     ECG rate:  64    ECG rate assessment: normal    Rhythm:     Rhythm: atrial fibrillation    Ectopy:     Ectopy: none    QRS:     QRS axis:  Normal    QRS intervals:  Normal  Conduction:     Conduction: normal    ST segments:     ST segments:  Normal  T waves:     T waves: normal

## 2018-10-09 ENCOUNTER — HOSPITAL ENCOUNTER (OUTPATIENT)
Dept: PHYSICAL THERAPY | Age: 83
Discharge: HOME | End: 2018-10-09
Payer: MEDICARE

## 2018-10-09 PROCEDURE — G8978 MOBILITY CURRENT STATUS: HCPCS

## 2018-10-09 PROCEDURE — 97163 PT EVAL HIGH COMPLEX 45 MIN: CPT

## 2018-10-09 PROCEDURE — G8979 MOBILITY GOAL STATUS: HCPCS

## 2018-10-09 NOTE — PROGRESS NOTES
Ambulatory/Rehab Services H2 Model Falls Risk Assessment    Risk Factor Pts. ·   Confusion/Disorientation/Impulsivity  []    4 ·   Symptomatic Depression  []   2 ·   Altered Elimination  []   1 ·   Dizziness/Vertigo  []   1 ·   Gender (Male)  [x]   1 ·   Any administered antiepileptics (anticonvulsants):  []   2 ·   Any administered benzodiazepines:  []   1 ·   Visual Impairment (specify):  []   1 ·   Portable Oxygen Use  []   1 ·   Orthostatic ? BP  []   1 ·   History of Recent Falls (within 3 mos.)  [x]   5     Ability to Rise from Chair (choose one) Pts. ·   Ability to rise in a single movement  []   0 ·   Pushes up, successful in one attempt  []   1 ·   Multiple attempts, but successful  [x]   3 ·   Unable to rise without assistance  []   4   Total: (5 or greater = High Risk) 9     Falls Prevention Plan:   []                Physical Limitations to Exercise left hemiplegia   []                Mobility Assistance Device straight cane   []                Exercise/Equipment Adaptation standby/contact guarding    ©2010 [facility] [address] Patent #[id number].  Federal Law prohibits the replication, distribution or use without written permission from [facility] [employer]

## 2018-10-09 NOTE — THERAPY EVALUATION
OUTPATIENT PHYSICAL THERAPY:Initial Assessment 10/9/2018   ICD-10: Treatment Diagnosis: Difficulty walking (R26.2)  Muscle weakness (M62.81)  Hemiplegia left non-dominant side (S28.661)  Weakness left hand (W12.456)  Precautions/Allergies:   Review of patient's allergies indicates no known allergies. Fall Risk Score: 9 (? 5 = High Risk)  MD Orders: Evaluate and Treat MEDICAL/REFERRING DIAGNOSIS:  Other abnormalities of gait and mobility [R26.89]  Weakness [R53.1]  History of falling [Z91.81]   DATE OF ONSET: 9/14/17  REFERRING PHYSICIAN: Bennett Dailey*  RETURN PHYSICIAN APPOINTMENT: February 2019     INITIAL ASSESSMENT:  Mr. 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 Was in hospital for one week post then transferred to rehabilitation facility from 9/22/17 until 12/3/17. At the time of his discharge he was walking very short distances in his home and used a wheelchair for majority of the day. He received home physical,  and speech therapy until the beginning of August 2018. He was discharged from occupational therapy last week. At this time he is able to walk approximately 5 minutes before needing to rest. He requires moderate assistance for dressing. He requires standby assistance for bathing. Requires assistance cutting food. He is independent with toileting. Mr Brigitte De Paz states that he has fallen approximately 4 times since December 2017. Last fall was 2 months ago. Mr Brigitte De Paz lives with his wife who assists him with ADLs and performing his home exercise program. His home program consists of walking in his home, balance ex, using a stationary bike, intermittent stretching of his hand using a Saebo dynamic splint, and performing some light /release activities with his left hand. Patient states that his goals are to increase his walking endurance both in and outside of the home.  He wishes to do a little yard work and have better use of his left hand during ADLs such as dressing. PROBLEM LIST (Impacting functional limitations):  1. Decreased Strength  2. Decreased ADL/Functional Activities  3. Decreased Ambulation Ability/Technique  4. Decreased Balance  5. Decreased Activity Tolerance  6. Increased Fatigue  7. Decreased Flexibility/Joint Mobility INTERVENTIONS PLANNED:  1. Balance Exercise  2. Family Education  3. Gait Training  4. Home Exercise Program (HEP)  5. Neuromuscular Re-education/Strengthening  6. Range of Motion (ROM)  7. Therapeutic Activites  8. Therapeutic Exercise/Strengthening   TREATMENT PLAN:  Effective Dates: 10/9/2018 TO 12/10/2018 (60 days). Frequency/Duration: 1 time a week for 60 Days  GOALS: (Goals have been discussed and agreed upon with patient.)  Short-Term Functional Goals: Time Frame: 4 weeks  1. Mr Loretta Verde will be able to walk for 10 minutes with standby supervision using straight cane. 2. Mr Loretta Verde will improve TUG score from 30 sec to 20 sec. 3. Mr Loretta Verde will increase lower extremity strength to 3+/5 for improved transfer and walking abilities. 4. Mr Loretta Verde will increase compliance with use of Saebo dynamic splint to 4 hours a day to further decrease spacity of the finger flexors and improve gross grasp ability. Discharge Goals: Time Frame: 8 weeks  1. Mr Loretta Verde will be able to walk continuously for 20 min. 2. Mr Loretta Verde will improve LEFS score by 10 points to improve overall functional mobility. 3. Mr Loretta Verde will require minimal assistance with dressing through improved left upper extremity function. 4. Mr Loretta Verde will reduce incidence of falling by improving overall strength, endurance and balance reactions. Rehabilitation Potential For Stated Goals: Good  Regarding Nori Florian therapy, I certify that the treatment plan above will be carried out by a therapist or under their direction.   Thank you for this referral,  Kandi Topete, PT CHT     Referring Physician Signature: Consuelo Chacon The information in this section was collected on 10/9/18 (except where otherwise noted). HISTORY:   History of Present Injury/Illness (Reason for Referral):  See above  Past Medical History/Comorbidities:   Mr. Brigitte De Paz  has a past medical history of Arrhythmia; Arthritis; CAD (coronary artery disease) (12/1999); Carotid artery stenosis without cerebral infarction (1/18/2016); Chronic pain; Claudication ([facility] Coronary atherosclerosis of native coronary artery (1/18/2016); GERD (gastroesophageal reflux disease); Hyperlipidemia; Hypertension; Kidney stones; MI (myocardial infarction) ([facility] (12/1999); PAF (paroxysmal atrial fibrillation) ([facility] Stroke [facility]); and [facility] Mr. 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 Social History/Living Environment:    lives with wife in one story home, walk in shower  Prior Level of Function/Work/Activity:  Mr Brigitte De Paz enjoyed playing golf and gardening    Current Medications:       Current Outpatient Prescriptions:     apixaban (ELIQUIS) 2.5 mg tablet, Take 2.5 mg by mouth two (2) times a day., Disp: , Rfl:     amLODIPine (NORVASC) 5 mg tablet, Take 5 mg by mouth daily. , Disp: , Rfl:     escitalopram oxalate (LEXAPRO) 10 mg tablet, Take 10 mg by mouth daily. , Disp: , Rfl:     allopurinol (ZYLOPRIM) 100 mg tablet, Take 100 mg by mouth daily. , Disp: , Rfl:     atorvastatin (LIPITOR) 80 mg tablet, Take 40 mg by mouth daily. , Disp: , Rfl:     aspirin 81 mg chewable tablet, Take 81 mg by mouth daily. , Disp: , Rfl:     furosemide (LASIX) 40 mg tablet, Take 40 mg by mouth as needed. , Disp: , Rfl:     lisinopril (PRINIVIL, ZESTRIL) 40 mg tablet, Take 40 mg by mouth daily. , Disp: , Rfl:     nitroglycerin (NITROSTAT) 0.4 mg SL tablet, 1 Tab by SubLINGual route every five (5) minutes as needed for Chest Pain., Disp: 2 Bottle, Rfl: 4   Date Last Reviewed:  10/9/18   Number of Personal Factors/Comorbidities that affect the Plan of Care: 3+: HIGH COMPLEXITY   EXAMINATION:   ROM:          Lower extremity active and passive motion WNL with exception left dorsiflexion limited to 5 active/passive. AROM  DATE  10/9/18 DATE     Flexion shoulder R: 160  L: 90 R:   L:     elbow flex/ext R: 140/0  L: 90/5 R:   L:    Wrist flex/ext R: 80/80  L: 80/5 moves within synergy pattern R:   L:     R:   L:  R:   L:     R:   L:  R:   L:     R:   L: R:   L:     Full assessment of upper extremity strength will performed next visit. Due to time constraints today. Strength:    Lower extremities Date:  10/9/18 Date:   Date:      Right                Left     Hip flexion 4/5             3/5     Hip extension 3+/5           3-/5     Knee extension  4/5             4-/5     Knee flexion  4/5            3+/5     dorsiflexion 4+/5          3-/5     Plantar flexion  4/5             3/5             Upper extremity strength DATE  10/9/18 DATE     Shoulder flexion R: 4/5  L: 3/5 R:   L:    Elbow flexion R: 5-  L: 3/5 R:   L:    Elbow extension R: 5-/5                  L: 3-/5  R:   L:    Wrist ext R: 5/5                   L: 3- performs in synergy pattern R:   L:     R:   L:  R:   L:     R:   L: R:   L:       Neurological Screen:        Sensation: will be assessed next visit. Patient reports parathesias in left hand  Functional Mobility:         Gait/Ambulation:  Ambulates with standby supervision with straight cane. No dorsiflexion assist. However note decreased heel strike on left. Transfers:  Requires use of right hand to push out of chair. Balance:          SIngle leg stance on right 1 second. Unable to perform on left.   Coordination:          Not assessed at this time  Activities of Daily Living:           Basic ADLs (From Assessment) Complex ADLs (From Assessment)         Grooming/Bathing/Dressing Activities of Daily Living Body Structures Involved:  1. Nerves  2. Muscles Body Functions Affected:  1. Neuromusculoskeletal  2. Movement Related Activities and Participation Affected:  1. General Tasks and Demands  2. Mobility  3. Self Care  4. Domestic Life   Number of elements (examined above) that affect the Plan of Care: 4+: HIGH COMPLEXITY   CLINICAL PRESENTATION:   Presentation: Evolving clinical presentation with changing clinical characteristics: MODERATE COMPLEXITY   CLINICAL DECISION MAKING:   Outcome Measure: Tool Used: Lower Extremity Functional Scale (LEFS)  Score:  Initial: 19/80 Most Recent: X/80 (Date: -- )   Interpretation of Score: 20 questions each scored on a 5 point scale with 0 representing \"extreme difficulty or unable to perform\" and 4 representing \"no difficulty\". The lower the score, the greater the functional disability. 80/80 represents no disability. Minimal detectable change is 9 points. Score 80 79-63 62-48 47-32 31-16 15-1 0   Modifier CH CI CJ CK CL CM CN     ? Mobility - Walking and Moving Around:     - CURRENT STATUS: CL - 60%-79% impaired, limited or restricted    - GOAL STATUS: CK - 40%-59% impaired, limited or restricted    - D/C STATUS:  ---------------To be determined---------------    Medical Necessity:   · Patient is expected to demonstrate progress in strength, range of motion, balance, coordination and functional technique to decrease assistance required with ambulation, increase independence with dressing and improve safety during ambulation . · Patient demonstrates good rehab potential due to higher previous functional level. Reason for Services/Other Comments:  · Patient continues to require skilled intervention due to decreased independence with ADLs. .   Use of outcome tool(s) and clinical judgement create a POC that gives a: Questionable prediction of patient's progress: MODERATE COMPLEXITY            TREATMENT:   (In addition to Assessment/Re-Assessment sessions the following treatments were rendered)  Pre-treatment Symptoms/Complaints:  Tired just getting ready and coming to therapy  Pain: Initial:     0 Post Session:  0   Treatment not performed today due to time constraints. Focus was on patient interview and physical assessment. [employer] Portal  Treatment/Session Assessment:    · Response to Treatment:  . · Compliance with Program/Exercises: Will assess as treatment progresses. · Recommendations/Intent for next treatment session: \"Next visit will focus on strength and balance activities\".   Total Treatment Duration:  PT Patient Time In/Time Out  Time In: 1351  Time Out: 2401 Yosi Leone, PT CHT    Future Appointments  Date Time Provider Anu Montoyaisti   10/15/2018 1:[facility] PM [facility]   1/10/2019 1:00 PM Nico Tamez MD [facility]

## 2018-10-15 ENCOUNTER — HOSPITAL ENCOUNTER (OUTPATIENT)
Dept: PHYSICAL THERAPY | Age: 83
Discharge: HOME | End: 2018-10-15
Payer: MEDICARE

## 2018-10-15 PROCEDURE — 97110 THERAPEUTIC EXERCISES: CPT

## 2018-10-15 NOTE — PROGRESS NOTES
OUTPATIENT PHYSICAL THERAPY:Daily Note 10/15/2018   ICD-10: Treatment Diagnosis: Difficulty walking (R26.2)  Muscle weakness (M62.81)  Hemiplegia left non-dominant side (J50.794)  Weakness left hand (K36.989)  Precautions/Allergies:   Review of patient's allergies indicates no known allergies. Fall Risk Score: 9 (? 5 = High Risk)  MD Orders: Evaluate and Treat MEDICAL/REFERRING DIAGNOSIS:  Other abnormalities of gait and mobility [R26.89]  Weakness [R53.1]  History of falling [Z91.81]   DATE OF ONSET: 9/14/17  REFERRING PHYSICIAN: Tara Landis*  RETURN PHYSICIAN APPOINTMENT: February 2019     INITIAL ASSESSMENT:  Mr. 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 Was in hospital for one week post then transferred to rehabilitation facility from 9/22/17 until 12/3/17. At the time of his discharge he was walking very short distances in his home and used a wheelchair for majority of the day. He received home physical,  and speech therapy until the beginning of August 2018. He was discharged from occupational therapy last week. At this time he is able to walk approximately 5 minutes before needing to rest. He requires moderate assistance for dressing. He requires standby assistance for bathing. Requires assistance cutting food. He is independent with toileting. Mr Loretta Verde states that he has fallen approximately 4 times since December 2017. Last fall was 2 months ago. Mr Loretta Verde lives with his wife who assists him with ADLs and performing his home exercise program. His home program consists of walking in his home, balance ex, using a stationary bike, intermittent stretching of his hand using a Saebo dynamic splint, and performing some light /release activities with his left hand. Patient states that his goals are to increase his walking endurance both in and outside of the home. He wishes to do a little yard work and have better use of his left hand during ADLs such as dressing. PROBLEM LIST (Impacting functional limitations):  1. Decreased Strength  2. Decreased ADL/Functional Activities  3. Decreased Ambulation Ability/Technique  4. Decreased Balance  5. Decreased Activity Tolerance  6. Increased Fatigue  7. Decreased Flexibility/Joint Mobility INTERVENTIONS PLANNED:  1. Balance Exercise  2. Family Education  3. Gait Training  4. Home Exercise Program (HEP)  5. Neuromuscular Re-education/Strengthening  6. Range of Motion (ROM)  7. Therapeutic Activites  8. Therapeutic Exercise/Strengthening   TREATMENT PLAN:  Effective Dates: 10/9/2018 TO 12/10/2018 (60 days). Frequency/Duration: 1 time a week for 60 Days  GOALS: (Goals have been discussed and agreed upon with patient.)  Short-Term Functional Goals: Time Frame: 4 weeks  1. Mr Omari Mccracken will be able to walk for 10 minutes with standby supervision using straight cane. 2. Mr Omari Mccracken will improve TUG score from 30 sec to 20 sec. 3. Mr Omari Mccracken will increase lower extremity strength to 3+/5 for improved transfer and walking abilities. 4. Mr Omari Mccracken will increase compliance with use of Saebo dynamic splint to 4 hours a day to further decrease spacity of the finger flexors and improve gross grasp ability. Discharge Goals: Time Frame: 8 weeks  1. Mr Omari Mccracken will be able to walk continuously for 20 min. 2. Mr Omari Mccracken will improve LEFS score by 10 points to improve overall functional mobility. 3. Mr Omari Mccracken will require minimal assistance with dressing through improved left upper extremity function. 4. Mr Omari Mccracken will reduce incidence of falling by improving overall strength, endurance and balance reactions. Rehabilitation Potential For Stated Goals: Good  Regarding Scooby Query therapy, I certify that the treatment plan above will be carried out by a therapist or under their direction.   Thank you for this referral,  Viry Wooten, PT CHT     Referring Physician Signature: Andrae Roberts              Date                    The information in this section was collected on 10/9/18 (except where otherwise noted). HISTORY:   History of Present Injury/Illness (Reason for Referral):  See above  Past Medical History/Comorbidities:   Mr. Akira Romero  has a past medical history of Arrhythmia; Arthritis; CAD (coronary artery disease) (12/1999); Carotid artery stenosis without cerebral infarction (1/18/2016); Chronic pain; Claudication ([facility] Coronary atherosclerosis of native coronary artery (1/18/2016); GERD (gastroesophageal reflux disease); Hyperlipidemia; Hypertension; Kidney stones; MI (myocardial infarction) ([facility] (12/1999); PAF (paroxysmal atrial fibrillation) ([facility] Stroke [facility]); and [facility] Mr. 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 Social History/Living Environment:    lives with wife in one story home, walk in shower  Prior Level of Function/Work/Activity:  Mr Akira Romero enjoyed playing golf and gardening    Current Medications:       Current Outpatient Prescriptions:     apixaban (ELIQUIS) 2.5 mg tablet, Take 2.5 mg by mouth two (2) times a day., Disp: , Rfl:     amLODIPine (NORVASC) 5 mg tablet, Take 5 mg by mouth daily. , Disp: , Rfl:     escitalopram oxalate (LEXAPRO) 10 mg tablet, Take 10 mg by mouth daily. , Disp: , Rfl:     allopurinol (ZYLOPRIM) 100 mg tablet, Take 100 mg by mouth daily. , Disp: , Rfl:     atorvastatin (LIPITOR) 80 mg tablet, Take 40 mg by mouth daily. , Disp: , Rfl:     aspirin 81 mg chewable tablet, Take 81 mg by mouth daily. , Disp: , Rfl:     furosemide (LASIX) 40 mg tablet, Take 40 mg by mouth as needed. , Disp: , Rfl:     lisinopril (PRINIVIL, ZESTRIL) 40 mg tablet, Take 40 mg by mouth daily. , Disp: , Rfl:     nitroglycerin (NITROSTAT) 0.4 mg SL tablet, 1 Tab by SubLINGual route every five (5) minutes as needed for Chest Pain., Disp: 2 Bottle, Rfl: 4   Date Last Reviewed:  10/9/18   Number of Personal Factors/Comorbidities that affect the Plan of Care: 3+: HIGH COMPLEXITY   EXAMINATION:   ROM:          Lower extremity active and passive motion WNL with exception left dorsiflexion limited to 5 active/passive. AROM  DATE  10/9/18 DATE     Flexion shoulder R: 160  L: 90 R:   L:     elbow flex/ext R: 140/0  L: 90/5 R:   L:    Wrist flex/ext R: 80/80  L: 80/5 moves within synergy pattern R:   L:     R:   L:  R:   L:     R:   L:  R:   L:     R:   L: R:   L:     Full assessment of upper extremity strength will performed next visit. Due to time constraints today. Strength:    Lower extremities Date:  10/9/18 Date:   Date:      Right                Left     Hip flexion 4/5             3/5     Hip extension 3+/5           3-/5     Knee extension  4/5             4-/5     Knee flexion  4/5            3+/5     dorsiflexion 4+/5          3-/5     Plantar flexion  4/5             3/5             Upper extremity strength DATE  10/9/18 DATE     Shoulder flexion R: 4/5  L: 3/5 R:   L:    Elbow flexion R: 5-  L: 3/5 R:   L:    Elbow extension R: 5-/5                  L: 3-/5  R:   L:    Wrist ext R: 5/5                   L: 3- performs in synergy pattern R:   L:     R:   L:  R:   L:     R:   L: R:   L:       Neurological Screen:        Sensation: will be assessed next visit. Patient reports parathesias in left hand  Functional Mobility:         Gait/Ambulation:  Ambulates with standby supervision with straight cane. No dorsiflexion assist. However note decreased heel strike on left. Transfers:  Requires use of right hand to push out of chair. Balance:          SIngle leg stance on right 1 second. Unable to perform on left.   Coordination:          Not assessed at this time  Activities of Daily Living:           Basic ADLs (From Assessment) Complex ADLs (From Assessment)         Grooming/Bathing/Dressing Activities of Daily Living Body Structures Involved:  1. Nerves  2. Muscles Body Functions Affected:  1. Neuromusculoskeletal  2. Movement Related Activities and Participation Affected:  1. General Tasks and Demands  2. Mobility  3. Self Care  4. Domestic Life   Number of elements (examined above) that affect the Plan of Care: 4+: HIGH COMPLEXITY   CLINICAL PRESENTATION:   Presentation: Evolving clinical presentation with changing clinical characteristics: MODERATE COMPLEXITY   CLINICAL DECISION MAKING:   Outcome Measure: Tool Used: Lower Extremity Functional Scale (LEFS)  Score:  Initial: 19/80 Most Recent: X/80 (Date: -- )   Interpretation of Score: 20 questions each scored on a 5 point scale with 0 representing \"extreme difficulty or unable to perform\" and 4 representing \"no difficulty\". The lower the score, the greater the functional disability. 80/80 represents no disability. Minimal detectable change is 9 points. Score 80 79-63 62-48 47-32 31-16 15-1 0   Modifier CH CI CJ CK CL CM CN     ? Mobility - Walking and Moving Around:     - CURRENT STATUS: CL - 60%-79% impaired, limited or restricted    - GOAL STATUS: CK - 40%-59% impaired, limited or restricted    - D/C STATUS:  ---------------To be determined---------------    Medical Necessity:   · Patient is expected to demonstrate progress in strength, range of motion, balance, coordination and functional technique to decrease assistance required with ambulation, increase independence with dressing and improve safety during ambulation . · Patient demonstrates good rehab potential due to higher previous functional level. Reason for Services/Other Comments:  · Patient continues to require skilled intervention due to decreased independence with ADLs. .   Use of outcome tool(s) and clinical judgement create a POC that gives a: Questionable prediction of patient's progress: MODERATE COMPLEXITY            TREATMENT:   (In addition to Assessment/Re-Assessment sessions the following treatments were rendered)  Pre-treatment Symptoms/Complaints:  Tired just getting ready and coming to therapy  Pain: Initial:     0 Post Session:  0     Treatment time 10/15/18 45 minutes     Date:  10/15/18 Date:   Date:     Activity/Exercise Parameters Parameters Parameters   recumbant bike 8 min level 6, manual cues assist to grasp handle with left hand      Chair squat 15 x with overhead reach, manual and verbal cues     LAQ 30 x 1.5 lbs bilateral     Overhead reach bilateral With hands clasped      Level surface walking 2.5 min rest 5 min, 3.0 min with gait belt. Contact guarding, verbal cues     Weight bearing through left UE in sitting 5 min     Grasp and release  Manual assist cues 5 min                   [employer] Portal  Treatment/Session Assessment:    · Response to Treatment:  .Client requires frequent rest breaks during ambulation. Responded well to verbal cues to look up when standing up and reaching up. Did better when he verbal said what he was going to do during the activity. Compliance with Program/Exercises: Will assess as treatment progresses. · Recommendations/Intent for next treatment session: \"Next visit will focus on functional strengthening, endurance and balance activities.   Total Treatment Duration: 45 min  PT Patient Time In/Time Out  Time In: 1342  Time Out: [address] [facility], PT CHT    Future Appointments  Date Time Provider Anu Montilla   10/24/2018 1:00 PM Ramin Zhang, RT [facility]   10/29/2018 1:00 PM Ana Zhang, RT [facility]   11/5/2018 1:00 PM Ana Zhang, RT [facility]   11/12/2018 1:00 PM Ana Zhang, RT [facility]   11/19/2018 1:00 PM Melissa Leefudara, RT [facility]   11/26/2018 1:00 PM Melissa Zhang, RT [facility]   1/10/2019 1:00 PM Ines Vivas MD [facility]

## 2018-10-24 ENCOUNTER — HOSPITAL ENCOUNTER (OUTPATIENT)
Dept: PHYSICAL THERAPY | Age: 83
Discharge: HOME | End: 2018-10-24
Payer: MEDICARE

## 2018-10-24 PROCEDURE — 97530 THERAPEUTIC ACTIVITIES: CPT

## 2018-10-24 PROCEDURE — 97110 THERAPEUTIC EXERCISES: CPT

## 2018-10-24 NOTE — PROGRESS NOTES
OUTPATIENT PHYSICAL THERAPY:Daily Note 10/24/2018   ICD-10: Treatment Diagnosis: Difficulty walking (R26.2)  Muscle weakness (M62.81)  Hemiplegia left non-dominant side (A06.103)  Weakness left hand (J46.368)  Precautions/Allergies:   Patient has no known allergies. Fall Risk Score: 9 (? 5 = High Risk)  MD Orders: Evaluate and Treat MEDICAL/REFERRING DIAGNOSIS:  Other abnormalities of gait and mobility [R26.89]  Weakness [R53.1]  History of falling [Z91.81]   DATE OF ONSET: 9/14/17  REFERRING PHYSICIAN: Yaneli Bell*  RETURN PHYSICIAN APPOINTMENT: February 2019     INITIAL ASSESSMENT:  Mr. 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 Was in hospital for one week post then transferred to rehabilitation facility from 9/22/17 until 12/3/17. At the time of his discharge he was walking very short distances in his home and used a wheelchair for majority of the day. He received home physical,  and speech therapy until the beginning of August 2018. He was discharged from occupational therapy last week. At this time he is able to walk approximately 5 minutes before needing to rest. He requires moderate assistance for dressing. He requires standby assistance for bathing. Requires assistance cutting food. He is independent with toileting. Mr Kelly Villarreal states that he has fallen approximately 4 times since December 2017. Last fall was 2 months ago. Mr Kelly Villarreal lives with his wife who assists him with ADLs and performing his home exercise program. His home program consists of walking in his home, balance ex, using a stationary bike, intermittent stretching of his hand using a Saebo dynamic splint, and performing some light /release activities with his left hand. Patient states that his goals are to increase his walking endurance both in and outside of the home. He wishes to do a little yard work and have better use of his left hand during ADLs such as dressing.    PROBLEM LIST (Impacting functional limitations):  1. Decreased Strength  2. Decreased ADL/Functional Activities  3. Decreased Ambulation Ability/Technique  4. Decreased Balance  5. Decreased Activity Tolerance  6. Increased Fatigue  7. Decreased Flexibility/Joint Mobility INTERVENTIONS PLANNED:  1. Balance Exercise  2. Family Education  3. Gait Training  4. Home Exercise Program (HEP)  5. Neuromuscular Re-education/Strengthening  6. Range of Motion (ROM)  7. Therapeutic Activites  8. Therapeutic Exercise/Strengthening   TREATMENT PLAN:  Effective Dates: 10/9/2018 TO 12/10/2018 (60 days). Frequency/Duration: 1 time a week for 60 Days  GOALS: (Goals have been discussed and agreed upon with patient.)  Short-Term Functional Goals: Time Frame: 4 weeks  1. Mr Prabhjot Baker will be able to walk for 10 minutes with standby supervision using straight cane. 2. Mr Prabhjot Baker will improve TUG score from 30 sec to 20 sec. 3. Mr Prabhjot Baker will increase lower extremity strength to 3+/5 for improved transfer and walking abilities. 4. Mr Prabhjot Baker will increase compliance with use of Saebo dynamic splint to 4 hours a day to further decrease spacity of the finger flexors and improve gross grasp ability. Discharge Goals: Time Frame: 8 weeks  1. Mr Prabhjot Baker will be able to walk continuously for 20 min. 2. Mr Prabhjot Baker will improve LEFS score by 10 points to improve overall functional mobility. 3. Mr Prabhjot Baker will require minimal assistance with dressing through improved left upper extremity function. 4. Mr Prabhjot Baker will reduce incidence of falling by improving overall strength, endurance and balance reactions. Rehabilitation Potential For Stated Goals: Good  Regarding [facility] therapy, I certify that the treatment plan above will be carried out by a therapist or under their direction.   Thank you for this referral,  Alta Nassar, PT CHT     Referring Physician Signature: Shaye Hsieh*              Date                    The information in this section was collected on 10/9/18 (except where otherwise noted). HISTORY:   History of Present Injury/Illness (Reason for Referral):  See above  Past Medical History/Comorbidities:   Mr. Katelynn Strong  has a past medical history of Arrhythmia, Arthritis, CAD (coronary artery disease), Carotid artery stenosis without cerebral infarction, Chronic pain, Claudication ([facility] [address] Coronary atherosclerosis of native coronary artery, GERD (gastroesophageal reflux disease), Hyperlipidemia, Hypertension, Kidney stones, MI (myocardial infarction) ([address] PAF (paroxysmal atrial fibrillation) ([address] Stroke ([address] and Thromboembolus ([facility] [address] Mr. Katelynn Strong  has a past surgical history that includes pr abdomen surgery proc unlisted (2000?); hx appendectomy (age 15); hx heent (1's?); hx ptca; pr cabg, artery-vein, three (12/1999); and CARDIAC ABLATION (N/A, 9/14/2010). Social History/Living Environment:    lives with wife in one story home, walk in shower  Prior Level of Function/Work/Activity:  Mr Katelynn Strong enjoyed playing golf and gardening    Current Medications:       Current Outpatient Medications:     apixaban (ELIQUIS) 2.5 mg tablet, Take 2.5 mg by mouth two (2) times a day., Disp: , Rfl:     amLODIPine (NORVASC) 5 mg tablet, Take 5 mg by mouth daily. , Disp: , Rfl:     escitalopram oxalate (LEXAPRO) 10 mg tablet, Take 10 mg by mouth daily. , Disp: , Rfl:     allopurinol (ZYLOPRIM) 100 mg tablet, Take 100 mg by mouth daily. , Disp: , Rfl:     atorvastatin (LIPITOR) 80 mg tablet, Take 40 mg by mouth daily. , Disp: , Rfl:     aspirin 81 mg chewable tablet, Take 81 mg by mouth daily. , Disp: , Rfl:     furosemide (LASIX) 40 mg tablet, Take 40 mg by mouth as needed. , Disp: , Rfl:     lisinopril (PRINIVIL, ZESTRIL) 40 mg tablet, Take 40 mg by mouth daily. , Disp: , Rfl:     nitroglycerin (NITROSTAT) 0.4 mg SL tablet, 1 Tab by SubLINGual route every five (5) minutes as needed for Chest Pain., Disp: 2 Bottle, Rfl: 4   Date Last Reviewed: 10/9/18   Number of Personal Factors/Comorbidities that affect the Plan of Care: 3+: HIGH COMPLEXITY   EXAMINATION:   ROM:          Lower extremity active and passive motion WNL with exception left dorsiflexion limited to 5 active/passive. AROM  DATE  10/9/18 DATE     Flexion shoulder R: 160  L: 90 R:   L:     elbow flex/ext R: 140/0  L: 90/5 R:   L:    Wrist flex/ext R: 80/80  L: 80/5 moves within synergy pattern R:   L:     R:   L:  R:   L:     R:   L:  R:   L:     R:   L: R:   L:     Full assessment of upper extremity strength will performed next visit. Due to time constraints today. Strength:    Lower extremities Date:  10/9/18 Date:   Date:      Right                Left     Hip flexion 4/5             3/5     Hip extension 3+/5           3-/5     Knee extension  4/5             4-/5     Knee flexion  4/5            3+/5     dorsiflexion 4+/5          3-/5     Plantar flexion  4/5             3/5             Upper extremity strength DATE  10/9/18 DATE     Shoulder flexion R: 4/5  L: 3/5 R:   L:    Elbow flexion R: 5-  L: 3/5 R:   L:    Elbow extension R: 5-/5                  L: 3-/5  R:   L:    Wrist ext R: 5/5                   L: 3- performs in synergy pattern R:   L:     R:   L:  R:   L:     R:   L: R:   L:       Neurological Screen:        Sensation: will be assessed next visit. Patient reports parathesias in left hand  Functional Mobility:         Gait/Ambulation:  Ambulates with standby supervision with straight cane. No dorsiflexion assist. However note decreased heel strike on left. Transfers:  Requires use of right hand to push out of chair. Balance:          SIngle leg stance on right 1 second. Unable to perform on left.   Coordination:          Not assessed at this time  Activities of Daily Living:           Basic ADLs (From Assessment) Complex ADLs (From Assessment)         Grooming/Bathing/Dressing Activities of Daily Living                                        Body Structures Involved:  1. Nerves  2. Muscles Body Functions Affected:  1. Neuromusculoskeletal  2. Movement Related Activities and Participation Affected:  1. General Tasks and Demands  2. Mobility  3. Self Care  4. Domestic Life   Number of elements (examined above) that affect the Plan of Care: 4+: HIGH COMPLEXITY   CLINICAL PRESENTATION:   Presentation: Evolving clinical presentation with changing clinical characteristics: MODERATE COMPLEXITY   CLINICAL DECISION MAKING:   Outcome Measure: Tool Used: Lower Extremity Functional Scale (LEFS)  Score:  Initial: 19/80 Most Recent: X/80 (Date: -- )   Interpretation of Score: 20 questions each scored on a 5 point scale with 0 representing \"extreme difficulty or unable to perform\" and 4 representing \"no difficulty\". The lower the score, the greater the functional disability. 80/80 represents no disability. Minimal detectable change is 9 points. Score 80 79-63 62-48 47-32 31-16 15-1 0   Modifier CH CI CJ CK CL CM CN     ? Mobility - Walking and Moving Around:     - CURRENT STATUS: CL - 60%-79% impaired, limited or restricted    - GOAL STATUS: CK - 40%-59% impaired, limited or restricted    - D/C STATUS:  ---------------To be determined---------------    Medical Necessity:   · Patient is expected to demonstrate progress in strength, range of motion, balance, coordination and functional technique to decrease assistance required with ambulation, increase independence with dressing and improve safety during ambulation . · Patient demonstrates good rehab potential due to higher previous functional level. Reason for Services/Other Comments:  · Patient continues to require skilled intervention due to decreased independence with ADLs. .   Use of outcome tool(s) and clinical judgement create a POC that gives a: Questionable prediction of patient's progress: MODERATE COMPLEXITY            TREATMENT:   (In addition to Assessment/Re-Assessment sessions the following treatments were rendered)  Pre-treatment Symptoms/Complaints:  Tired just getting ready and coming to therapy  Pain: Initial:     0 Post Session:  0     Treatment time 10/24/18 15 minutes     Date:  10/15/18 Date:  10/24/18 Date:     Activity/Exercise Parameters Parameters Parameters   recumbant bike 8 min level 6, manual cues assist to grasp handle with left hand  10 min manual cues to maintain  on handle level 5     Chair squat 15 x with overhead reach, manual and verbal cues 1    LAQ 30 x 1.5 lbs bilateral     Overhead reach bilateral With hands clasped      Level surface walking 2.5 min rest 5 min, 3.0 min with gait belt. Contact guarding, verbal cues 5 min     Weight bearing through left UE in sitting 5 min     Grasp and release  Manual assist cues 5 min       [employer] 30 min 10/24/18 :     Date:  10/24/18 Date:   Date:     Activity/Exercise Parameters Parameters Parameters   Assisted grasp of cup to mouth 5 sets of 5     Assisted finger food  to mouth 8 min     Family education functional training techniques 8 min                                         Offsite Care Resources Portal  Treatment/Session Assessment:    · Response to Treatment:  .Client requires frequent rest breaks during ambulation. Able to hold left hand on handle for longer periods of time using NUstep, able to bring bottle to mouth with assistance at elbow wrist .   Compliance with Program/Exercises: Will assess as treatment progresses. · Recommendations/Intent for next treatment session: \"Next visit will focus on functional strengthening, endurance and balance activities.   Total Treatment Duration: 45 min  PT Patient Time In/Time Out  Time In: 1300  Time Out: 3998    Evelin Bowles, PT CHT    Future Appointments   Date Time Provider Anu Montilla   10/29/2018  1:00 PM Franca Zhang, RT [facility]   11/5/2018  1:00 PM Debora Zhang, RT [facility]   11/12/2018  1:00 PM [facility]   11/19/2018  1:00 PM Rolanda [facility]   1/10/2019  1:00 PM Ty Henley MD [facility]

## 2018-10-29 ENCOUNTER — HOSPITAL ENCOUNTER (OUTPATIENT)
Dept: PHYSICAL THERAPY | Age: 83
Discharge: HOME | End: 2018-10-29
Payer: MEDICARE

## 2018-10-29 PROCEDURE — 97530 THERAPEUTIC ACTIVITIES: CPT

## 2018-10-29 PROCEDURE — 97110 THERAPEUTIC EXERCISES: CPT

## 2018-10-29 NOTE — PROGRESS NOTES
OUTPATIENT PHYSICAL THERAPY:Daily Note 10/29/2018   ICD-10: Treatment Diagnosis: Difficulty walking (R26.2)  Muscle weakness (M62.81)  Hemiplegia left non-dominant side (Q00.719)  Weakness left hand (P99.839)  Precautions/Allergies:   Patient has no known allergies. Fall Risk Score: 9 (? 5 = High Risk)  MD Orders: Evaluate and Treat MEDICAL/REFERRING DIAGNOSIS:  Other abnormalities of gait and mobility [R26.89]  Weakness [R53.1]  History of falling [Z91.81]   DATE OF ONSET: 9/14/17  REFERRING PHYSICIAN: Lynne Larkin*  RETURN PHYSICIAN APPOINTMENT: February 2019     INITIAL ASSESSMENT:  Mr. 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 Was in hospital for one week post then transferred to rehabilitation facility from 9/22/17 until 12/3/17. At the time of his discharge he was walking very short distances in his home and used a wheelchair for majority of the day. He received home physical,  and speech therapy until the beginning of August 2018. He was discharged from occupational therapy last week. At this time he is able to walk approximately 5 minutes before needing to rest. He requires moderate assistance for dressing. He requires standby assistance for bathing. Requires assistance cutting food. He is independent with toileting. Mr Shahid Ribeiro states that he has fallen approximately 4 times since December 2017. Last fall was 2 months ago. Mr Shahid Ribeiro lives with his wife who assists him with ADLs and performing his home exercise program. His home program consists of walking in his home, balance ex, using a stationary bike, intermittent stretching of his hand using a Saebo dynamic splint, and performing some light /release activities with his left hand. Patient states that his goals are to increase his walking endurance both in and outside of the home. He wishes to do a little yard work and have better use of his left hand during ADLs such as dressing.    PROBLEM LIST (Impacting functional limitations):  1. Decreased Strength  2. Decreased ADL/Functional Activities  3. Decreased Ambulation Ability/Technique  4. Decreased Balance  5. Decreased Activity Tolerance  6. Increased Fatigue  7. Decreased Flexibility/Joint Mobility INTERVENTIONS PLANNED:  1. Balance Exercise  2. Family Education  3. Gait Training  4. Home Exercise Program (HEP)  5. Neuromuscular Re-education/Strengthening  6. Range of Motion (ROM)  7. Therapeutic Activites  8. Therapeutic Exercise/Strengthening   TREATMENT PLAN:  Effective Dates: 10/9/2018 TO 12/10/2018 (60 days). Frequency/Duration: 1 time a week for 60 Days  GOALS: (Goals have been discussed and agreed upon with patient.)  Short-Term Functional Goals: Time Frame: 4 weeks  1. Mr Emily Rai will be able to walk for 10 minutes with standby supervision using straight cane. 2. Mr Emily Rai will improve TUG score from 30 sec to 20 sec. 3. Mr Emily Rai will increase lower extremity strength to 3+/5 for improved transfer and walking abilities. 4. Mr Emily Rai will increase compliance with use of Saebo dynamic splint to 4 hours a day to further decrease spacity of the finger flexors and improve gross grasp ability. Discharge Goals: Time Frame: 8 weeks  1. Mr Emily Rai will be able to walk continuously for 20 min. 2. Mr Emily Rai will improve LEFS score by 10 points to improve overall functional mobility. 3. Mr Emily Rai will require minimal assistance with dressing through improved left upper extremity function. 4. Mr Emily Rai will reduce incidence of falling by improving overall strength, endurance and balance reactions. Rehabilitation Potential For Stated Goals: Good  Regarding Caty Taylornabila therapy, I certify that the treatment plan above will be carried out by a therapist or under their direction.   Thank you for this referral,  Arianna Paez, PT CHT     Referring Physician Signature: Carlos Alberto Peck*              Date                    The information in this section was collected on 10/9/18 (except where otherwise noted). HISTORY:   History of Present Injury/Illness (Reason for Referral):  See above  Past Medical History/Comorbidities:   Mr. Claudia Amanda  has a past medical history of Arrhythmia, Arthritis, CAD (coronary artery disease), Carotid artery stenosis without cerebral infarction, Chronic pain, Claudication ([address] Coronary atherosclerosis of native coronary artery, GERD (gastroesophageal reflux disease), Hyperlipidemia, Hypertension, Kidney stones, MI (myocardial infarction) ([address] PAF (paroxysmal atrial fibrillation) ([address] Stroke ([address] and Thromboembol[address] ([facility] [address] Mr. Claudia Amanda  has a past surgical history that includes pr abdomen surgery proc unlisted (2000?); hx appendectomy (age 15); hx heent (1's?); hx ptca; pr cabg, artery-vein, three (12/1999); and CARDIAC ABLATION (N/A, 9/14/2010). Social History/Living Environment:    lives with wife in one story home, walk in shower  Prior Level of Function/Work/Activity:  Mr Claudia Amanda enjoyed playing golf and gardening    Current Medications:       Current Outpatient Medications:     apixaban (ELIQUIS) 2.5 mg tablet, Take 2.5 mg by mouth two (2) times a day., Disp: , Rfl:     amLODIPine (NORVASC) 5 mg tablet, Take 5 mg by mouth daily. , Disp: , Rfl:     escitalopram oxalate (LEXAPRO) 10 mg tablet, Take 10 mg by mouth daily. , Disp: , Rfl:     allopurinol (ZYLOPRIM) 100 mg tablet, Take 100 mg by mouth daily. , Disp: , Rfl:     atorvastatin (LIPITOR) 80 mg tablet, Take 40 mg by mouth daily. , Disp: , Rfl:     aspirin 81 mg chewable tablet, Take 81 mg by mouth daily. , Disp: , Rfl:     furosemide (LASIX) 40 mg tablet, Take 40 mg by mouth as needed. , Disp: , Rfl:     lisinopril (PRINIVIL, ZESTRIL) 40 mg tablet, Take 40 mg by mouth daily. , Disp: , Rfl:     nitroglycerin (NITROSTAT) 0.4 mg SL tablet, 1 Tab by SubLINGual route every five (5) minutes as needed for Chest Pain., Disp: 2 Bottle, Rfl: 4   Date Last Reviewed: 10/9/18   Number of Personal Factors/Comorbidities that affect the Plan of Care: 3+: HIGH COMPLEXITY   EXAMINATION:   ROM:          Lower extremity active and passive motion WNL with exception left dorsiflexion limited to 5 active/passive. AROM  DATE  10/9/18 DATE     Flexion shoulder R: 160  L: 90 R:   L:     elbow flex/ext R: 140/0  L: 90/5 R:   L:    Wrist flex/ext R: 80/80  L: 80/5 moves within synergy pattern R:   L:     R:   L:  R:   L:     R:   L:  R:   L:     R:   L: R:   L:     Full assessment of upper extremity strength will performed next visit. Due to time constraints today. Strength:    Lower extremities Date:  10/9/18 Date:   Date:      Right                Left     Hip flexion 4/5             3/5     Hip extension 3+/5           3-/5     Knee extension  4/5             4-/5     Knee flexion  4/5            3+/5     dorsiflexion 4+/5          3-/5     Plantar flexion  4/5             3/5             Upper extremity strength DATE  10/9/18 DATE     Shoulder flexion R: 4/5  L: 3/5 R:   L:    Elbow flexion R: 5-  L: 3/5 R:   L:    Elbow extension R: 5-/5                  L: 3-/5  R:   L:    Wrist ext R: 5/5                   L: 3- performs in synergy pattern R:   L:     R:   L:  R:   L:     R:   L: R:   L:       Neurological Screen:        Sensation: will be assessed next visit. Patient reports parathesias in left hand  Functional Mobility:         Gait/Ambulation:  Ambulates with standby supervision with straight cane. No dorsiflexion assist. However note decreased heel strike on left. Transfers:  Requires use of right hand to push out of chair. Balance:          SIngle leg stance on right 1 second. Unable to perform on left.   Coordination:          Not assessed at this time  Activities of Daily Living:           Basic ADLs (From Assessment) Complex ADLs (From Assessment)         Grooming/Bathing/Dressing Activities of Daily Living                                        Body Structures Involved:  1. Nerves  2. Muscles Body Functions Affected:  1. Neuromusculoskeletal  2. Movement Related Activities and Participation Affected:  1. General Tasks and Demands  2. Mobility  3. Self Care  4. Domestic Life   Number of elements (examined above) that affect the Plan of Care: 4+: HIGH COMPLEXITY   CLINICAL PRESENTATION:   Presentation: Evolving clinical presentation with changing clinical characteristics: MODERATE COMPLEXITY   CLINICAL DECISION MAKING:   Outcome Measure: Tool Used: Lower Extremity Functional Scale (LEFS)  Score:  Initial: 19/80 Most Recent: X/80 (Date: -- )   Interpretation of Score: 20 questions each scored on a 5 point scale with 0 representing \"extreme difficulty or unable to perform\" and 4 representing \"no difficulty\". The lower the score, the greater the functional disability. 80/80 represents no disability. Minimal detectable change is 9 points. Score 80 79-63 62-48 47-32 31-16 15-1 0   Modifier CH CI CJ CK CL CM CN     ? Mobility - Walking and Moving Around:     - CURRENT STATUS: CL - 60%-79% impaired, limited or restricted    - GOAL STATUS: CK - 40%-59% impaired, limited or restricted    - D/C STATUS:  ---------------To be determined---------------    Medical Necessity:   · Patient is expected to demonstrate progress in strength, range of motion, balance, coordination and functional technique to decrease assistance required with ambulation, increase independence with dressing and improve safety during ambulation . · Patient demonstrates good rehab potential due to higher previous functional level. Reason for Services/Other Comments:  · Patient continues to require skilled intervention due to decreased independence with ADLs. .   Use of outcome tool(s) and clinical judgement create a POC that gives a: Questionable prediction of patient's progress: MODERATE COMPLEXITY            TREATMENT:   (In addition to Assessment/Re-Assessment sessions the following treatments were rendered)  Pre-treatment Symptoms/Complaints: Has been working with wife on eating and drinking skills is very frustrating. Pain: Initial:     0 Post Session:  0     Therapeutic ex 30 min     Date:  10/15/18 Date:  10/29/18 Date:     Activity/Exercise Parameters Parameters Parameters   recumbant bike 8 min level 6, manual cues assist to grasp handle with left hand  8 min Nustep    Chair squat 15 x with overhead reach, manual and verbal cues 10 x    LAQ 30 x 1.5 lbs bilateral     Overhead reach bilateral With hands clasped  2 x 10     Level surface walking 2.5 min rest 5 min, 3.0 min with gait belt. Contact guarding, verbal cues 3 min stand by guarding    Weight bearing through left UE in sitting 5 min 5 min    Grasp and release  Manual assist cues 5 min       Therapeutic activity Date:  10/29/18 Date:   Date:     Activity/Exercise Parameters Parameters Parameters   Drinking from bottle  15 min, work on reaching, grasping, bring hand to mouth, gaze fixation, verbal planning                                                       [facility] Portal  Treatment/Session Assessment:    · Response to Treatment:  .Client demonstrates good isolated motion of left shoulder and elbow. Continues to be influenced by synergy patterns at the wrist and fingers . Is able to hold a bottle if he places his left hand on it with the right. Can bring the bottle to his lips with minimal cues with wrist positioning by therapist. Reviewed with patient and family importance of performing functional tasks daily to fatigue in order to improve motor control. Compliance with Program/Exercises: Will assess as treatment progresses. · Recommendations/Intent for next treatment session: \"Next visit will focus on functional strengthening, endurance and balance activities.   Total Treatment Duration: 45 min  PT Patient Time In/Time Out  Time In: 1305  Time Out: 1400    Mariano Zhang, [place] CHT    Future Appointments   Date Time Provider Anu Montilla   11/5/2018  1:00 PM Marco Zhang, RT [facility]   11/12/2018  1:00 PM Marco Zhang, RT [facility]   11/19/2018  1:00 PM Marco Zhang, RT [facility]   1/10/2019  1:00 PM Pascual Butterfield MD [facility]

## 2018-11-05 ENCOUNTER — HOSPITAL ENCOUNTER (OUTPATIENT)
Dept: PHYSICAL THERAPY | Age: 83
Discharge: HOME | End: 2018-11-05
Payer: MEDICARE

## 2018-11-05 PROCEDURE — 97110 THERAPEUTIC EXERCISES: CPT

## 2018-11-05 NOTE — PROGRESS NOTES
OUTPATIENT PHYSICAL THERAPY:Daily Note 11/5/2018   ICD-10: Treatment Diagnosis: Difficulty walking (R26.2)  Muscle weakness (M62.81)  Hemiplegia left non-dominant side (H67.513)  Weakness left hand (H98.241)  Precautions/Allergies:   Patient has no known allergies. Fall Risk Score: 9 (? 5 = High Risk)  MD Orders: Evaluate and Treat MEDICAL/REFERRING DIAGNOSIS:  Other abnormalities of gait and mobility [R26.89]  Weakness [R53.1]  History of falling [Z91.81]   DATE OF ONSET: 9/14/17  REFERRING PHYSICIAN: Lolis Guerra*  RETURN PHYSICIAN APPOINTMENT: February 2019     INITIAL ASSESSMENT:  Mr. 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 Was in hospital for one week post then transferred to rehabilitation facility from 9/22/17 until 12/3/17. At the time of his discharge he was walking very short distances in his home and used a wheelchair for majority of the day. He received home physical,  and speech therapy until the beginning of August 2018. He was discharged from occupational therapy last week. At this time he is able to walk approximately 5 minutes before needing to rest. He requires moderate assistance for dressing. He requires standby assistance for bathing. Requires assistance cutting food. He is independent with toileting. Mr Cruz Angeles states that he has fallen approximately 4 times since December 2017. Last fall was 2 months ago. Mr Cruz Angeles lives with his wife who assists him with ADLs and performing his home exercise program. His home program consists of walking in his home, balance ex, using a stationary bike, intermittent stretching of his hand using a Saebo dynamic splint, and performing some light /release activities with his left hand. Patient states that his goals are to increase his walking endurance both in and outside of the home. He wishes to do a little yard work and have better use of his left hand during ADLs such as dressing.    PROBLEM LIST (Impacting functional limitations):  1. Decreased Strength  2. Decreased ADL/Functional Activities  3. Decreased Ambulation Ability/Technique  4. Decreased Balance  5. Decreased Activity Tolerance  6. Increased Fatigue  7. Decreased Flexibility/Joint Mobility INTERVENTIONS PLANNED:  1. Balance Exercise  2. Family Education  3. Gait Training  4. Home Exercise Program (HEP)  5. Neuromuscular Re-education/Strengthening  6. Range of Motion (ROM)  7. Therapeutic Activites  8. Therapeutic Exercise/Strengthening   TREATMENT PLAN:  Effective Dates: 10/9/2018 TO 12/10/2018 (60 days). Frequency/Duration: 1 time a week for 60 Days  GOALS: (Goals have been discussed and agreed upon with patient.)  Short-Term Functional Goals: Time Frame: 4 weeks  1. Mr Prabhjot Baker will be able to walk for 10 minutes with standby supervision using straight cane. 2. Mr Prabhjot Baker will improve TUG score from 30 sec to 20 sec. 3. Mr Prabhjot Baker will increase lower extremity strength to 3+/5 for improved transfer and walking abilities. 4. Mr Prabhjot Baker will increase compliance with use of Saebo dynamic splint to 4 hours a day to further decrease spacity of the finger flexors and improve gross grasp ability. Discharge Goals: Time Frame: 8 weeks  1. Mr Prabhjot Baker will be able to walk continuously for 20 min. 2. Mr Prabhjot Baker will improve LEFS score by 10 points to improve overall functional mobility. 3. Mr Prabhjot Baker will require minimal assistance with dressing through improved left upper extremity function. 4. Mr Prabhjot Baker will reduce incidence of falling by improving overall strength, endurance and balance reactions. Rehabilitation Potential For Stated Goals: Good  Regarding [facility] therapy, I certify that the treatment plan above will be carried out by a therapist or under their direction.   Thank you for this referral,  Alta Nassar, PT CHT     Referring Physician Signature: Shaye Hsieh*              Date                    The information in this section was collected on 10/9/18 (except where otherwise noted). HISTORY:   History of Present Injury/Illness (Reason for Referral):  See above  Past Medical History/Comorbidities:   Mr. Lesli Cuevas  has a past medical history of Arrhythmia, Arthritis, CAD (coronary artery disease), Carotid artery stenosis without cerebral infarction, Chronic pain, Claudication ([facility] [address] Coronary atherosclerosis of native coronary artery, GERD (gastroesophageal reflux disease), Hyperlipidemia, Hypertension, Kidney stones, MI (myocardial infarction) ([facility] [address] PAF (paroxysmal atrial fibrillation) ([facility] [address] Stroke ([facility] [address] and Thromboembolus ([facility] [address] Mr. Lesli Cuevas  has a past surgical history that includes pr abdomen surgery proc unlisted (2000?); hx appendectomy (age 15); hx heent (1's?); hx ptca; pr cabg, artery-vein, three (12/1999); and CARDIAC ABLATION (N/A, 9/14/2010). Social History/Living Environment:    lives with wife in one story home, walk in shower  Prior Level of Function/Work/Activity:  Mr Lesli Cuevas enjoyed playing golf and gardening    Current Medications:       Current Outpatient Medications:     apixaban (ELIQUIS) 2.5 mg tablet, Take 2.5 mg by mouth two (2) times a day., Disp: , Rfl:     amLODIPine (NORVASC) 5 mg tablet, Take 5 mg by mouth daily. , Disp: , Rfl:     escitalopram oxalate (LEXAPRO) 10 mg tablet, Take 10 mg by mouth daily. , Disp: , Rfl:     allopurinol (ZYLOPRIM) 100 mg tablet, Take 100 mg by mouth daily. , Disp: , Rfl:     atorvastatin (LIPITOR) 80 mg tablet, Take 40 mg by mouth daily. , Disp: , Rfl:     aspirin 81 mg chewable tablet, Take 81 mg by mouth daily. , Disp: , Rfl:     furosemide (LASIX) 40 mg tablet, Take 40 mg by mouth as needed. , Disp: , Rfl:     lisinopril (PRINIVIL, ZESTRIL) 40 mg tablet, Take 40 mg by mouth daily. , Disp: , Rfl:     nitroglycerin (NITROSTAT) 0.4 mg SL tablet, 1 Tab by SubLINGual route every five (5) minutes as needed for Chest Pain., Disp: 2 Bottle, Rfl: 4   Date Last Reviewed: 10/9/18   Number of Personal Factors/Comorbidities that affect the Plan of Care: 3+: HIGH COMPLEXITY   EXAMINATION:   ROM:          Lower extremity active and passive motion WNL with exception left dorsiflexion limited to 5 active/passive. AROM  DATE  10/9/18 DATE     Flexion shoulder R: 160  L: 90 R:   L:     elbow flex/ext R: 140/0  L: 90/5 R:   L:    Wrist flex/ext R: 80/80  L: 80/5 moves within synergy pattern R:   L:     R:   L:  R:   L:     R:   L:  R:   L:     R:   L: R:   L:     Full assessment of upper extremity strength will performed next visit. Due to time constraints today. Strength:    Lower extremities Date:  10/9/18 Date:   Date:      Right                Left     Hip flexion 4/5             3/5     Hip extension 3+/5           3-/5     Knee extension  4/5             4-/5     Knee flexion  4/5            3+/5     dorsiflexion 4+/5          3-/5     Plantar flexion  4/5             3/5             Upper extremity strength DATE  10/9/18 DATE     Shoulder flexion R: 4/5  L: 3/5 R:   L:    Elbow flexion R: 5-  L: 3/5 R:   L:    Elbow extension R: 5-/5                  L: 3-/5  R:   L:    Wrist ext R: 5/5                   L: 3- performs in synergy pattern R:   L:     R:   L:  R:   L:     R:   L: R:   L:       Neurological Screen:        Sensation: will be assessed next visit. Patient reports parathesias in left hand  Functional Mobility:         Gait/Ambulation:  Ambulates with standby supervision with straight cane. No dorsiflexion assist. However note decreased heel strike on left. Transfers:  Requires use of right hand to push out of chair. Balance:          SIngle leg stance on right 1 second. Unable to perform on left.   Coordination:          Not assessed at this time  Activities of Daily Living:           Basic ADLs (From Assessment) Complex ADLs (From Assessment)         Grooming/Bathing/Dressing Activities of Daily Living                                        Body Structures Involved:  1. Nerves  2. Muscles Body Functions Affected:  1. Neuromusculoskeletal  2. Movement Related Activities and Participation Affected:  1. General Tasks and Demands  2. Mobility  3. Self Care  4. Domestic Life   Number of elements (examined above) that affect the Plan of Care: 4+: HIGH COMPLEXITY   CLINICAL PRESENTATION:   Presentation: Evolving clinical presentation with changing clinical characteristics: MODERATE COMPLEXITY   CLINICAL DECISION MAKING:   Outcome Measure: Tool Used: Lower Extremity Functional Scale (LEFS)  Score:  Initial: 19/80 Most Recent: X/80 (Date: -- )   Interpretation of Score: 20 questions each scored on a 5 point scale with 0 representing \"extreme difficulty or unable to perform\" and 4 representing \"no difficulty\". The lower the score, the greater the functional disability. 80/80 represents no disability. Minimal detectable change is 9 points. Score 80 79-63 62-48 47-32 31-16 15-1 0   Modifier CH CI CJ CK CL CM CN     ? Mobility - Walking and Moving Around:     - CURRENT STATUS: CL - 60%-79% impaired, limited or restricted    - GOAL STATUS: CK - 40%-59% impaired, limited or restricted    - D/C STATUS:  ---------------To be determined---------------    Medical Necessity:   · Patient is expected to demonstrate progress in strength, range of motion, balance, coordination and functional technique to decrease assistance required with ambulation, increase independence with dressing and improve safety during ambulation . · Patient demonstrates good rehab potential due to higher previous functional level. Reason for Services/Other Comments:  · Patient continues to require skilled intervention due to decreased independence with ADLs. .   Use of outcome tool(s) and clinical judgement create a POC that gives a: Questionable prediction of patient's progress: MODERATE COMPLEXITY            TREATMENT:   (In addition to Assessment/Re-Assessment sessions the following treatments were rendered)  Pre-treatment Symptoms/Complaints: fell last week in his home, bruised left hand. Not feeling so great today. Pain: Initial:     0 Post Session:  0     Therapeutic ex 40 min     Date:  10/15/18 Date:  10/29/18 Date:  11/5/18   Activity/Exercise Parameters Parameters Parameters   recumbant bike 8 min level 6, manual cues assist to grasp handle with left hand  8 min Nustep 8 min level 6   Chair squat 15 x with overhead reach, manual and verbal cues 10 x    LAQ 30 x 1.5 lbs bilateral     Overhead reach bilateral With hands clasped  2 x 10  2 x 10   Level surface walking 2.5 min rest 5 min, 3.0 min with gait belt. Contact guarding, verbal cues 3 min stand by guarding    Weight bearing through left UE in sitting 5 min 5 min    Grasp and release  Manual assist cues 5 min     Ball rolling on table with laser guidance   10 min find numbers on wall. Reaching with laser guidance   Forward and in scapular plane find numbers on wall. Instruct wife in use of laser as part of home program. 10 min     Therapeutic activity Date:  10/29/18 Date:   Date:     Activity/Exercise Parameters Parameters Parameters   Drinking from bottle  15 min, work on reaching, grasping, bring hand to mouth, gaze fixation, verbal planning                                                       [employer] Portal  Treatment/Session Assessment:    · Response to Treatment:  .Client demonstrated increased left sided neglect today, could not maintain grasp without assistance during ex on Nustep. Noted increased engagement with use of laser. By end of treatment could reach with minimal assist to find targets on wall. Compliance with Program/Exercises: Will assess as treatment progresses. · Recommendations/Intent for next treatment session: \"Next visit will focus on functional strengthening, endurance and balance activities.   Total Treatment Duration: 45 min  PT Patient Time In/Time Out  Time In: 1300  Time Out: 1201 Swipp Vicente, PT CHT    Future Appointments   Date Time Provider Anu Eladia   11/12/2018  1:00 PM Alondra Zhang, RT [facility]   11/19/2018  1:00 PM Alondra Zhang, RT [facility]   1/10/2019  1:00 PM [facility]

## 2018-11-12 ENCOUNTER — APPOINTMENT (OUTPATIENT)
Dept: PHYSICAL THERAPY | Age: 83
End: 2018-11-12
Payer: MEDICARE

## 2018-11-19 ENCOUNTER — APPOINTMENT (OUTPATIENT)
Dept: PHYSICAL THERAPY | Age: 83
End: 2018-11-19
Payer: MEDICARE

## 2018-11-25 ENCOUNTER — APPOINTMENT (OUTPATIENT)
Dept: GENERAL RADIOLOGY | Age: 83
End: 2018-11-25
Attending: EMERGENCY MEDICINE
Payer: MEDICARE

## 2018-11-25 ENCOUNTER — HOSPITAL ENCOUNTER (EMERGENCY)
Age: 83
Discharge: HOME | End: 2018-11-25
Attending: EMERGENCY MEDICINE
Payer: MEDICARE

## 2018-11-25 VITALS
HEART RATE: 68 BPM | OXYGEN SATURATION: 97 % | SYSTOLIC BLOOD PRESSURE: 147 MMHG | HEIGHT: 67 IN | WEIGHT: 150 LBS | DIASTOLIC BLOOD PRESSURE: 83 MMHG | RESPIRATION RATE: 14 BRPM | TEMPERATURE: 98.5 F | BODY MASS INDEX: 23.54 KG/M2

## 2018-11-25 DIAGNOSIS — I95.9 TRANSIENT HYPOTENSION: Primary | ICD-10-CM

## 2018-11-25 LAB
ALBUMIN SERPL-MCNC: 3.1 G/DL (ref 3.2–4.6)
ALBUMIN/GLOB SERPL: 1.1 {RATIO}
ALP SERPL-CCNC: 88 U/L (ref 50–136)
ALT SERPL-CCNC: 28 U/L (ref 12–65)
ANION GAP SERPL CALC-SCNC: 7 MMOL/L
AST SERPL-CCNC: 26 U/L (ref 15–37)
ATRIAL RATE: 74 BPM
BILIRUB SERPL-MCNC: 0.3 MG/DL (ref 0.2–1.1)
BUN SERPL-MCNC: 31 MG/DL (ref 8–23)
CALCIUM SERPL-MCNC: 8.2 MG/DL (ref 8.3–10.4)
CALCULATED R AXIS, ECG10: 86 DEGREES
CALCULATED T AXIS, ECG11: 62 DEGREES
CHLORIDE SERPL-SCNC: 111 MMOL/L (ref 98–107)
CO2 SERPL-SCNC: 26 MMOL/L (ref 21–32)
CREAT SERPL-MCNC: 1.18 MG/DL (ref 0.8–1.5)
DIAGNOSIS, 93000: NORMAL
ERYTHROCYTE [DISTWIDTH] IN BLOOD BY AUTOMATED COUNT: 13.2 %
GLOBULIN SER CALC-MCNC: 2.8 G/DL (ref 2.3–3.5)
GLUCOSE SERPL-MCNC: 102 MG/DL (ref 65–100)
HCT VFR BLD AUTO: 38.3 % (ref 41.1–50.3)
HGB BLD-MCNC: 12.5 G/DL (ref 13.6–17.2)
MAGNESIUM SERPL-MCNC: 2.1 MG/DL (ref 1.8–2.4)
MCH RBC QN AUTO: 32.1 PG (ref 26.1–32.9)
MCHC RBC AUTO-ENTMCNC: 32.6 G/DL (ref 31.4–35)
MCV RBC AUTO: 98.2 FL (ref 79.6–97.8)
NRBC # BLD: 0 K/UL (ref 0–0.2)
PLATELET # BLD AUTO: 169 K/UL (ref 150–450)
PMV BLD AUTO: 12.1 FL (ref 9.4–12.3)
POTASSIUM SERPL-SCNC: 4.3 MMOL/L (ref 3.5–5.1)
PROT SERPL-MCNC: 5.9 G/DL
Q-T INTERVAL, ECG07: 424 MS
QRS DURATION, ECG06: 86 MS
QTC CALCULATION (BEZET), ECG08: 450 MS
RBC # BLD AUTO: 3.9 M/UL (ref 4.23–5.6)
SODIUM SERPL-SCNC: 144 MMOL/L (ref 136–145)
TROPONIN I BLD-MCNC: 0.01 NG/ML (ref 0.02–0.05)
VENTRICULAR RATE, ECG03: 68 BPM
WBC # BLD AUTO: 8.5 K/UL (ref 4.3–11.1)

## 2018-11-25 PROCEDURE — 93005 ELECTROCARDIOGRAM TRACING: CPT | Performed by: EMERGENCY MEDICINE

## 2018-11-25 PROCEDURE — 83735 ASSAY OF MAGNESIUM: CPT

## 2018-11-25 PROCEDURE — 84484 ASSAY OF TROPONIN QUANT: CPT

## 2018-11-25 PROCEDURE — 71045 X-RAY EXAM CHEST 1 VIEW: CPT

## 2018-11-25 PROCEDURE — 85027 COMPLETE CBC AUTOMATED: CPT

## 2018-11-25 PROCEDURE — 99285 EMERGENCY DEPT VISIT HI MDM: CPT | Performed by: EMERGENCY MEDICINE

## 2018-11-25 PROCEDURE — 80053 COMPREHEN METABOLIC PANEL: CPT

## 2018-11-25 NOTE — ED TRIAGE NOTES
Pt here by ems, pt was sitting in the bathroom at home, wife stated that the pt slumped over, BP 70/40 on EMS arrival, received 500 mL bolus, BP came up to 140/70. Pt has left sided deficits from previous stroke. Stroke in September.

## 2018-11-25 NOTE — DISCHARGE INSTRUCTIONS
Follow-up with your doctor in the morning for recheck  Take all medications as directed  Increase fluids  Return to ER for any worsening symptoms or new problems which may arise

## 2018-11-25 NOTE — ED NOTES
I have reviewed discharge instructions with the patient and spouse. The patient and spouse verbalized understanding. Patient left ED via Discharge Method: wheelchair to Home with spouse Opportunity for questions and clarification provided. Patient given 0 scripts. To continue your aftercare when you leave the hospital, you may receive an automated call from our care team to check in on how you are doing. This is a free service and part of our promise to provide the best care and service to meet your aftercare needs.  If you have questions, or wish to unsubscribe from this service please call [phone]. Thank you for Choosing our [facility] Emergency Department.

## 2018-11-25 NOTE — ED PROVIDER NOTES
24-year-old male presents to the ER with complaints of a syncope, near syncope episode Family and patient report that the patient was sitting on a stool in his bathroom performing his normal ADLs when he had an episode of weakness and began to act like he might pass out Wife reports that the patient slumped in the stool, but did not fall to the floor. She reports that he was weak and had to be held up It seems that he was probably unconscious for a short period of time, although the wife recollection of the events is somewhat fuzzy Patient has had similar episodes in the past 
According to the wife, there've never found a specific etiology for these episodes EMS reported that the blood pressure was low today, which is similar to at least one other episode No recent illnesses No recent change in medications. The history is provided by the patient, the spouse and a friend. Syncope This is a recurrent problem. The current episode started 1 to 2 hours ago. The problem occurs rarely. The problem has been resolved. He lost consciousness for a period of less than one minute. The problem is associated with nothing. Pertinent negatives include no chest pain, no palpitations, no abdominal pain, no bowel incontinence, no bladder incontinence, no focal weakness, no seizures and no head injury. He has tried nothing for the symptoms. His past medical history is significant for CVA, CAD, HTN and syncope. Past Medical History:  
Diagnosis Date  Arrhythmia   
 reason for current procedure  Arthritis   
 osteo  CAD (coronary artery disease) 12/1999 CABG no stents  Carotid artery stenosis without cerebral infarction 1/18/2016  Chronic pain   
 arthritic  Claudication ([address]  Coronary atherosclerosis of native coronary artery 1/18/2016  GERD (gastroesophageal reflux disease)   
 controlled with meds  Hyperlipidemia  Hypertension   
 controlled with meds  Kidney stones hx of  MI (myocardial infarction) ([facility] [address] 1999  PA[facility]oxysmal [facility] fibrillation) [facility]  Stroke ([facility]  Thromboembolus ([facility] Past Surgical History:  
Procedure Laterality Date  [address] UNLISTED  ? hernia repair  CABG, ARTERY-VEIN, THREE  1999 CABG no stents  HX APPENDECTOMY  age 15  
 HX HEENT  12's?  
 left cataract  HX PTCA Family History:  
Problem Relation Age of Onset  Coronary Artery Disease Other   
     family history Social History Socioeconomic History  Marital status:  Spouse name: Not on file  Number of children: Not on file  Years of education: Not on file  Highest education level: Not on file Social Needs  Financial resource strain: Not on file  Food insecurity - worry: Not on file  Food insecurity - inability: Not on file  Transportation needs - medical: Not on file  Transportation needs - non-medical: Not on file Occupational History  Not on file Tobacco Use  Smoking status: Former Smoker Packs/day: 1.50 Years: 35.00 Pack years: 52.50 Last attempt to quit: 10/1/1986 Years since quittin.1  Smokeless tobacco: Never Used  Tobacco comment: quit  Substance and Sexual Activity  Alcohol use: Yes Alcohol/week: 4.0 oz Types: 7 Glasses of wine, 1 Shots of liquor per week Comment: minimal  
 Drug use: No  
 Sexual activity: Not on file Other Topics Concern  Not on file Social History Narrative  Not on file ALLERGIES: Patient has no known allergies. Review of Systems Unable to perform ROS: Dementia Cardiovascular: Positive for syncope. Negative for chest pain and palpitations. Gastrointestinal: Negative for abdominal pain and bowel incontinence. Genitourinary: Negative for bladder incontinence. Neurological: Positive for syncope. Negative for focal weakness and seizures. Vitals: 11/25/18 1136 BP: 157/74 Pulse: 67 Resp: 16 Temp: 97.9 °F (36.6 °C) SpO2: 97% Weight: 68 kg (150 lb) Height: 5' 7\" (1.702 m) Physical Exam  
Constitutional: He is oriented to person, place, and time. He appears well-developed and well-nourished. He appears distressed. HENT:  
Head: Normocephalic and atraumatic. Mouth/Throat: Oropharynx is clear and moist. No oropharyngeal exudate. Eyes: Conjunctivae and EOM are normal. Pupils are equal, round, and reactive to light. No scleral icterus. Neck: Normal range of motion. Neck supple. No JVD present. No thyromegaly present. Cardiovascular: Normal rate, regular rhythm, normal heart sounds and intact distal pulses. Exam reveals no gallop and no friction rub. No murmur heard. Pulmonary/Chest: Effort normal and breath sounds normal. No respiratory distress. He has no wheezes. Abdominal: Soft. Bowel sounds are normal. He exhibits no distension. There is no hepatosplenomegaly. There is no tenderness. Musculoskeletal: Normal range of motion. He exhibits no edema, tenderness or deformity. Neurological: He is alert and oriented to person, place, and time. No cranial nerve deficit or sensory deficit. Skin: Skin is warm and dry. Capillary refill takes less than 2 seconds. No rash noted. Psychiatric: His speech is normal. Judgment and thought content normal. His affect is blunt. He is slowed. He exhibits abnormal recent memory. Nursing note and vitals reviewed. MDM Number of Diagnoses or Management Options Transient hypotension: established and worsening Diagnosis management comments: EKG reviewed Atrial fibrillation with frequent PVCs Normal axis No acute ischemic changes No significant interval change from December 2017 2:15 PM 
Workup today is unremarkable Patient will be discharged home to his wife's care, as usual 
Blood pressure has remained stable here I will not make any medication changes at this time Strongly encourage patient to follow-up with his internist tomorrow for recheck Amount and/or Complexity of Data Reviewed Clinical lab tests: ordered and reviewed Tests in the radiology section of CPT®: ordered and reviewed Tests in the medicine section of CPT®: ordered and reviewed Review and summarize past medical records: yes Risk of Complications, Morbidity, and/or Mortality Presenting problems: moderate Diagnostic procedures: moderate Management options: moderate General comments: Elements of this note have been dictated via voice recognition software. Text and phrases may be limited by the accuracy of the software. The chart has been reviewed, but errors may still be present. Patient Progress Patient progress: resolved Procedures

## 2018-11-26 ENCOUNTER — APPOINTMENT (OUTPATIENT)
Dept: PHYSICAL THERAPY | Age: 83
End: 2018-11-26
Payer: MEDICARE

## 2019-05-21 NOTE — PROGRESS NOTES
OUTPATIENT PHYSICAL THERAPY:Discharge 5/21/19   ICD-10: Treatment Diagnosis: Difficulty walking (R26.2)  Muscle weakness (M62.81)  Hemiplegia left non-dominant side (S21.642)  Weakness left hand (S04.923)  Precautions/Allergies:   Patient has no known allergies. Fall Risk Score: 9 (? 5 = High Risk)  MD Orders: Evaluate and Treat MEDICAL/REFERRING DIAGNOSIS:  Other abnormalities of gait and mobility [R26.89]  Weakness [R53.1]  History of falling [Z91.81]   DATE OF ONSET: 9/14/17  REFERRING PHYSICIAN: Akin Guzman*  RETURN PHYSICIAN APPOINTMENT: February 2019     FINAL ASSESSMENT:         MR Murdock self discharged from therapy due to lack of progress. Cancelled last two appointments and did not reschedule. INITIAL ASSESSMENT:  Mr. Aissatou Kearney presented with left hemiplegia following CVA one year ago. Was in hospital for one week post then transferred to rehabilitation facility from 9/22/17 until 12/3/17. At the time of his discharge he was walking very short distances in his home and used a wheelchair for majority of the day. He received home physical,  and speech therapy until the beginning of August 2018. He was discharged from occupational therapy last week. At this time he is able to walk approximately 5 minutes before needing to rest. He requires moderate assistance for dressing. He requires standby assistance for bathing. Requires assistance cutting food. He is independent with toileting. Mr Aissatou Kearney states that he has fallen approximately 4 times since December 2017. Last fall was 2 months ago. Mr Aissatou Kearney lives with his wife who assists him with ADLs and performing his home exercise program. His home program consists of walking in his home, balance ex, using a stationary bike, intermittent stretching of his hand using a Saebo dynamic splint, and performing some light /release activities with his left hand.  Patient states that his goals are to increase his walking endurance both in and outside of the home. He wishes to do a little yard work and have better use of his left hand during ADLs such as dressing.        Spencer Beckett PT CHT

## 2019-09-15 NOTE — PROGRESS NOTES
Problem: Dysphagia (Adult)  Goal: *Acute Goals and Plan of Care (Insert Text)  STG: Pt will consume trials pureed/nectar thick liquids without signs/sx aspiration 100% (goal revised 9/15/17)  STG: Pt will complete oral motor exercises with 80% accuracy. STG: Pt will participate in a ST evaluation x1. LTG: Pt will tolerate least restrictive diet without signs/sx aspiration 100% for safe swallow function. SPEECH LANGUAGE PATHOLOGY: BEDSIDE SWALLOW NOTE: Daily Note 1     NAME/AGE/GENDER: Prem Brown is a 80 y.o. male  DATE: 9/15/2017  PRIMARY DIAGNOSIS: Stroke [facility])       ICD-10: Treatment Diagnosis: dysphagia, oropharyngeal 13.12  INTERDISCIPLINARY COLLABORATION: Registered Nurse and Physician  PRECAUTIONS/ALLERGIES: Review of patient's allergies indicates no known allergies. ASSESSMENT:   Patient seen with po trials. He keeps his eyes closed but responds to all questions. He is oriented and follows basic directions. Left facial droop and lingual deviation to the left. No overt signs/sx of aspiration with 2oz of nectar liquids via the spoon and 2oz via the straw. Increased time to propel via the straw at first.  Clear vocal quality with no cough or throat clear. Tolerated 4 oz of puree with only trace to mild residual in left buccal cavity. Swallow initiation time varies between almost immediate to 5 second delay with trials. Patient not appropriate for assessment with chewable textures at this time due to extent of oral dysphagia. Recommend pureed/nectar thick liquids. Meds crushed in puree. Feeding assistance. Verbal and written information provided for patient's wife to review regarding current diet recommendations and rationale. Discussed safe swallowing strategies with patient and family. Patient will benefit from skilled intervention to address the below impairments. ?????? ? ? This section established at most recent assessment??????????  PROBLEM LIST (Impairments causing functional limitations):  1. Dysphagia  2. Facial weakness  3. Dysarthria  4. dysfluencies  REHABILITATION POTENTIAL FOR STATED GOALS: GOOD      PLAN OF CARE:   Patient will benefit from skilled intervention to address the following impairments. RECOMMENDATIONS AND PLANNED INTERVENTIONS (Benefits and precautions of therapy have been discussed with the patient.):  · NPO  MEDICATIONS:  · Crushed in puree  COMPENSATORY STRATEGIES/MODIFICATIONS INCLUDING:  · None  OTHER RECOMMENDATIONS (including follow up treatment recommendations):   · Oral motor exercises  · ST evaluation  · Po trials  RECOMMENDED DIET MODIFICATIONS DISCUSSED WITH:  · Hospitalist  · Nursing  · ER MD  · Patient  FREQUENCY/DURATION: Continue to follow patient 3 times a week for duration of hospital stay to address above goals. RECOMMENDED REHABILITATION/EQUIPMENT: (at time of discharge pending progress): Due to the probability of continued deficits (see above) this patient will likely need continued skilled speech therapy after discharge. SUBJECTIVE:   Pt cooperative. Pt's wife and good friend present. History of Present Injury/Illness: Mr. Trevor Thapa  has a past medical history of Arrhythmia; Arthritis; CAD (coronary artery disease) (12/1999); Carotid artery stenosis without cerebral infarction (1/18/2016); Chronic pain; Claudication ([address] Coronary atherosclerosis of native coronary artery (1/18/2016); GERD (gastroesophageal reflux disease); Hyperlipidemia; Hypertension; Kidney stones; MI (myocardial infarction) ([address] (12/1999); PAF (paroxysmal atrial fibrillation) ([address] and Thromboembolus ([address] .   He also  has a past surgical history that includes cardiac surg procedure unlist (12/1999); abdomen surgery proc unlisted (2000?); appendectomy (age 15); heent (1's?); and ptca. Present Symptoms: dysphagia    Pain Intensity 1: 0  Current Medications:   No current facility-administered medications on file prior to encounter.       Current Outpatient Prescriptions on File Prior to Encounter   Medication Sig Dispense Refill    naproxen (NAPROSYN) 500 mg tablet TAKE ONE TABLET BY MOUTH ONCE DAILY AS NEEDED 30 Tab 0    metoprolol tartrate (LOPRESSOR) 25 mg tablet Take 0.5 Tabs by mouth every morning. 30 Tab 6    esomeprazole (NEXIUM) 20 mg capsule Take 20 mg by mouth daily.  magnesium 250 mg tab Take 250 mg by mouth daily.  furosemide (LASIX) 40 mg tablet Take 1 Tab by mouth daily. 90 Tab 3    aspirin 81 mg chewable tablet Take 1 Tab by mouth daily.  lisinopril (PRINIVIL, ZESTRIL) 40 mg tablet Take 1 Tab by mouth every morning. 30 Tab 6    nitroglycerin (NITROSTAT) 0.4 mg SL tablet 1 Tab by SubLINGual route every five (5) minutes as needed for Chest Pain. 2 Bottle 4    atorvastatin (LIPITOR) 40 mg tablet Take 40 mg by mouth daily.  tamsulosin (FLOMAX) 0.4 mg capsule Take 0.4 mg by mouth daily.  amlodipine (NORVASC) 5 mg tablet Take 5 mg by mouth every morning.  MULTI-VITAMIN PO Take 1 Tab by mouth every morning.  zinc 50 mg capsule Take 50 mg by mouth every morning.  ascorbic acid (VITAMIN C) 1,000 mg tablet Take 1,000 mg by mouth every morning.  lutein 6 mg Cap Take 1 Tab by mouth every morning.  arginine 500 mg tablet Take 500 mg by mouth every morning.  sildenafil citrate (VIAGRA) 100 mg tablet Take 1 Tab by mouth as needed. 10 Tab 3     Current Dietary Status:  NPO                 History of reflux:  NO   · Reflux medication:  Social History/Home Situation: residing with spouse   Home Environment: Private residence  # Steps to Enter: 0  One/Two Story Residence: One story  Living Alone: No  Support Systems: Spouse/Significant Other/Partner, Family member(s)  Patient Expects to be Discharged to[de-identified] Private residence  Current DME Used/Available at Home: Blood pressure cuff      OBJECTIVE:   Respiratory Status:  Room air     CXR Results: n/a  MRI/CT Results: Probable subacute right MCA CVA.  Note: If a subtle CVA is suspected,  MRI would be more definitive if clinically warranted. Oral Motor Structure/Speech:  Oral-Motor Structure/Motor Speech  Labial: Left droop  Dentition: Upper dentures, Intact, Natural  Oral Hygiene: adequate  Lingual: Left deviation     Cognitive and Communication Status:  Neurologic State: Alert  Orientation Level: Oriented X4  Cognition: Follows commands  Perception:  (keeps eyes closed\)  Perseveration: No perseveration noted  Safety/Judgement: Decreased insight into deficits     BEDSIDE SWALLOW EVALUATION  Oral Assessment:  Oral Assessment  Labial: Left droop  Dentition: Upper dentures; Intact; Natural  Lingual: Left deviation  P.O. Trials:  Patient Position: upright in bed     The patient was given teaspoon to straw amounts of the following:   Consistency Presented: Puree;Nectar thick liquid; nectar thick liquid  How Presented: Self-fed/presented;Spoon;Straw     ORAL PHASE:  Bolus Acceptance: Impaired  Bolus Formation/Control: Impaired  Propulsion: Delayed (# of seconds)  Type of Impairment: Delayed;Spillage;Lip closure  Oral Residue: Less than 10% of bolus;Pocketing;Left     PHARYNGEAL PHASE:  Initiation of Swallow: Delayed (# of seconds)  Laryngeal Elevation: Functional  Aspiration Signs/Symptoms: None  Vocal Quality: No impairment           Pharyngeal Phase Characteristics: Audible swallow     OTHER OBSERVATIONS:  Rate/bite size: Impaired   Endurance:  Impaired      Comments:         Tool Used: Dysphagia Outcome and Severity Scale (JOSÉ)     Score Comments   Normal Diet  [ ] 7 With no strategies or extra time needed   Functional Swallow  [ ] 6 May have mild oral or pharyngeal delay         Mild Dysphagia     [ ] 5 Which may require one diet consistency restricted (those who demonstrate penetration which is entirely cleared on MBS would be included)   Mild-Moderate Dysphagia  [ ] 4 With 1-2 diet consistencies restricted         Moderate Dysphagia  [ ] 3 With 2 or more diet consistencies restricted         Moderately Severe Dysphagia  [ ] 2 With partial PO strategies (trials with ST only)         Severe Dysphagia  [ ] 1 With inability to tolerate any PO safely            Score:  Initial: 2 Most Recent: X (Date: -- )   Interpretation of Tool: The Dysphagia Outcome and Severity Scale (JOSÉ) is a simple, easy-to-use, 7-point scale developed to systematically rate the functional severity of dysphagia based on objective assessment and make recommendations for diet level, independence level, and type of nutrition. Score 7 6 5 4 3 2 1   Modifier CH CI CJ CK CL CM CN   · Swallowing:               - CURRENT STATUS:           CM - 80%-99% impaired, limited or restricted               - GOAL STATUS:                   CK - 40%-59% impaired, limited or restricted               - D/C STATUS:                       ---------------To be determined---------------  [facility] [place] [facility] / Plan[place] [employer] [place] / Product Type: Managed Care Medicare /       TREATMENT:               (In addition to Assessment/Re-Assessment sessions the following treatments were rendered)  Assessment/Reassessment only, no treatment provided today  MODALITIES:                                                                     ORAL MOTOR  EXERCISES:                                                                                                                                                                       LARYNGEAL / PHARYNGEAL EXERCISES:                                                                                                                                      __________________________________________________________________________________________________  Safety:   After treatment position/precautions:  · Upright in Bed  Treatment Assessment:   .   Progression/Medical Necessity:   · Skilled intervention continues to be required due to persistent signs and symptoms of aspiration. Compliance with Program/Exercises: Will assess as treatment progresses. Reason for Continuation of Services/Other Comments:  · Patient continues to require skilled intervention due to dysphagia . Recommendations/Intent for next treatment session: \"Treatment next visit will focus on diet tolerance\".      Total Treatment Duration:  Time In: 0912  Time Out: 1400 [facility] MS, CCC-SLP [facility]

## 2020-01-01 ENCOUNTER — APPOINTMENT (OUTPATIENT)
Dept: CT IMAGING | Age: 85
DRG: 480 | End: 2020-01-01
Attending: EMERGENCY MEDICINE
Payer: MEDICARE

## 2020-01-01 ENCOUNTER — APPOINTMENT (OUTPATIENT)
Dept: GENERAL RADIOLOGY | Age: 85
DRG: 480 | End: 2020-01-01
Attending: ANESTHESIOLOGY
Payer: MEDICARE

## 2020-01-01 ENCOUNTER — APPOINTMENT (OUTPATIENT)
Dept: MRI IMAGING | Age: 85
DRG: 069 | End: 2020-01-01
Attending: NURSE PRACTITIONER
Payer: MEDICARE

## 2020-01-01 ENCOUNTER — HOSPITAL ENCOUNTER (INPATIENT)
Age: 85
LOS: 2 days | DRG: 480 | End: 2020-05-19
Attending: EMERGENCY MEDICINE | Admitting: INTERNAL MEDICINE
Payer: MEDICARE

## 2020-01-01 ENCOUNTER — ANESTHESIA (OUTPATIENT)
Dept: SURGERY | Age: 85
DRG: 480 | End: 2020-01-01
Payer: MEDICARE

## 2020-01-01 ENCOUNTER — APPOINTMENT (OUTPATIENT)
Dept: CT IMAGING | Age: 85
DRG: 480 | End: 2020-01-01
Attending: INTERNAL MEDICINE
Payer: MEDICARE

## 2020-01-01 ENCOUNTER — APPOINTMENT (OUTPATIENT)
Dept: GENERAL RADIOLOGY | Age: 85
DRG: 480 | End: 2020-01-01
Attending: ORTHOPAEDIC SURGERY
Payer: MEDICARE

## 2020-01-01 ENCOUNTER — APPOINTMENT (OUTPATIENT)
Dept: CT IMAGING | Age: 85
DRG: 069 | End: 2020-01-01
Attending: EMERGENCY MEDICINE
Payer: MEDICARE

## 2020-01-01 ENCOUNTER — APPOINTMENT (OUTPATIENT)
Dept: ULTRASOUND IMAGING | Age: 85
DRG: 480 | End: 2020-01-01
Attending: INTERNAL MEDICINE
Payer: MEDICARE

## 2020-01-01 ENCOUNTER — APPOINTMENT (OUTPATIENT)
Dept: GENERAL RADIOLOGY | Age: 85
DRG: 480 | End: 2020-01-01
Attending: INTERNAL MEDICINE
Payer: MEDICARE

## 2020-01-01 ENCOUNTER — APPOINTMENT (OUTPATIENT)
Dept: GENERAL RADIOLOGY | Age: 85
DRG: 480 | End: 2020-01-01
Attending: EMERGENCY MEDICINE
Payer: MEDICARE

## 2020-01-01 ENCOUNTER — HOSPITAL ENCOUNTER (INPATIENT)
Age: 85
LOS: 3 days | Discharge: NURSING, REHAB OR LONG-TERM CARE | DRG: 069 | End: 2020-02-05
Attending: EMERGENCY MEDICINE | Admitting: INTERNAL MEDICINE
Payer: MEDICARE

## 2020-01-01 ENCOUNTER — APPOINTMENT (OUTPATIENT)
Dept: GENERAL RADIOLOGY | Age: 85
DRG: 069 | End: 2020-01-01
Attending: NURSE PRACTITIONER
Payer: MEDICARE

## 2020-01-01 ENCOUNTER — ANESTHESIA EVENT (OUTPATIENT)
Dept: SURGERY | Age: 85
DRG: 480 | End: 2020-01-01
Payer: MEDICARE

## 2020-01-01 ENCOUNTER — APPOINTMENT (OUTPATIENT)
Dept: CT IMAGING | Age: 85
DRG: 069 | End: 2020-01-01
Attending: NURSE PRACTITIONER
Payer: MEDICARE

## 2020-01-01 VITALS
BODY MASS INDEX: 21.56 KG/M2 | TEMPERATURE: 97.9 F | RESPIRATION RATE: 18 BRPM | SYSTOLIC BLOOD PRESSURE: 140 MMHG | DIASTOLIC BLOOD PRESSURE: 65 MMHG | HEART RATE: 67 BPM | WEIGHT: 141.8 LBS | OXYGEN SATURATION: 97 %

## 2020-01-01 VITALS
WEIGHT: 153 LBS | TEMPERATURE: 98.8 F | HEIGHT: 68 IN | OXYGEN SATURATION: 98 % | DIASTOLIC BLOOD PRESSURE: 35 MMHG | SYSTOLIC BLOOD PRESSURE: 64 MMHG | BODY MASS INDEX: 23.19 KG/M2

## 2020-01-01 DIAGNOSIS — I25.10 CORONARY ARTERY DISEASE INVOLVING NATIVE HEART WITHOUT ANGINA PECTORIS, UNSPECIFIED VESSEL OR LESION TYPE: Chronic | ICD-10-CM

## 2020-01-01 DIAGNOSIS — R65.21 SEPTIC SHOCK (HCC): ICD-10-CM

## 2020-01-01 DIAGNOSIS — E87.5 HYPERKALEMIA: ICD-10-CM

## 2020-01-01 DIAGNOSIS — E78.5 DYSLIPIDEMIA: Chronic | ICD-10-CM

## 2020-01-01 DIAGNOSIS — I10 ESSENTIAL HYPERTENSION: Chronic | ICD-10-CM

## 2020-01-01 DIAGNOSIS — N17.9 AKI (ACUTE KIDNEY INJURY) (HCC): ICD-10-CM

## 2020-01-01 DIAGNOSIS — I48.20 CHRONIC ATRIAL FIBRILLATION (HCC): ICD-10-CM

## 2020-01-01 DIAGNOSIS — R65.21 SEVERE SEPSIS WITH SEPTIC SHOCK (HCC): ICD-10-CM

## 2020-01-01 DIAGNOSIS — Z86.73 HISTORY OF CVA (CEREBROVASCULAR ACCIDENT): ICD-10-CM

## 2020-01-01 DIAGNOSIS — I48.0 PAF (PAROXYSMAL ATRIAL FIBRILLATION) (HCC): Chronic | ICD-10-CM

## 2020-01-01 DIAGNOSIS — R74.8 ELEVATED CK: ICD-10-CM

## 2020-01-01 DIAGNOSIS — S72.002A CLOSED LEFT HIP FRACTURE, INITIAL ENCOUNTER (HCC): Primary | ICD-10-CM

## 2020-01-01 DIAGNOSIS — J80 ARDS (ADULT RESPIRATORY DISTRESS SYNDROME) (HCC): ICD-10-CM

## 2020-01-01 DIAGNOSIS — I63.9 CEREBROVASCULAR ACCIDENT (CVA), UNSPECIFIED MECHANISM (HCC): Primary | ICD-10-CM

## 2020-01-01 DIAGNOSIS — S42.202A CLOSED FRACTURE OF PROXIMAL END OF LEFT HUMERUS, UNSPECIFIED FRACTURE MORPHOLOGY, INITIAL ENCOUNTER: ICD-10-CM

## 2020-01-01 DIAGNOSIS — T17.908A ASPIRATION INTO AIRWAY, INITIAL ENCOUNTER: ICD-10-CM

## 2020-01-01 DIAGNOSIS — J95.821 RESPIRATORY FAILURE, POST-OPERATIVE (HCC): ICD-10-CM

## 2020-01-01 DIAGNOSIS — A41.9 SEVERE SEPSIS WITH SEPTIC SHOCK (HCC): ICD-10-CM

## 2020-01-01 DIAGNOSIS — N17.9 ACUTE KIDNEY INJURY (HCC): ICD-10-CM

## 2020-01-01 DIAGNOSIS — S42.92XA CLOSED FRACTURE OF LEFT SHOULDER, INITIAL ENCOUNTER: ICD-10-CM

## 2020-01-01 DIAGNOSIS — G45.9 TIA (TRANSIENT ISCHEMIC ATTACK): ICD-10-CM

## 2020-01-01 DIAGNOSIS — A41.9 SEPTIC SHOCK (HCC): ICD-10-CM

## 2020-01-01 LAB
ALBUMIN SERPL-MCNC: 1.8 G/DL (ref 3.2–4.6)
ALBUMIN SERPL-MCNC: 3.4 G/DL (ref 3.2–4.6)
ALBUMIN SERPL-MCNC: 3.6 G/DL (ref 3.2–4.6)
ALBUMIN/GLOB SERPL: 0.8 {RATIO} (ref 1.2–3.5)
ALBUMIN/GLOB SERPL: 1.1 {RATIO} (ref 1.2–3.5)
ALBUMIN/GLOB SERPL: 1.2 {RATIO} (ref 1.2–3.5)
ALP SERPL-CCNC: 78 U/L (ref 50–136)
ALP SERPL-CCNC: 81 U/L (ref 50–136)
ALP SERPL-CCNC: 89 U/L (ref 50–136)
ALT SERPL-CCNC: 28 U/L (ref 12–65)
ALT SERPL-CCNC: 29 U/L (ref 12–65)
ALT SERPL-CCNC: 389 U/L (ref 12–65)
ANION GAP SERPL CALC-SCNC: 10 MMOL/L (ref 7–16)
ANION GAP SERPL CALC-SCNC: 20 MMOL/L (ref 7–16)
ANION GAP SERPL CALC-SCNC: 28 MMOL/L (ref 7–16)
ANION GAP SERPL CALC-SCNC: 6 MMOL/L (ref 7–16)
ANION GAP SERPL CALC-SCNC: 6 MMOL/L (ref 7–16)
ANION GAP SERPL CALC-SCNC: 7 MMOL/L (ref 7–16)
ANION GAP SERPL CALC-SCNC: 8 MMOL/L (ref 7–16)
APPEARANCE UR: CLEAR
ARTERIAL PATENCY WRIST A: ABNORMAL
ARTERIAL PATENCY WRIST A: NO
ARTERIAL PATENCY WRIST A: YES
AST SERPL-CCNC: 26 U/L (ref 15–37)
AST SERPL-CCNC: 30 U/L (ref 15–37)
AST SERPL-CCNC: 455 U/L (ref 15–37)
ATRIAL RATE: 104 BPM
ATRIAL RATE: 127 BPM
BACTERIA URNS QL MICRO: 0 /HPF
BASE DEFICIT BLD-SCNC: 19 MMOL/L
BASE DEFICIT BLD-SCNC: 21 MMOL/L
BASE DEFICIT BLD-SCNC: 3 MMOL/L
BASOPHILS # BLD: 0 K/UL (ref 0–0.2)
BASOPHILS # BLD: 0.1 K/UL (ref 0–0.2)
BASOPHILS NFR BLD: 0 % (ref 0–2)
BASOPHILS NFR BLD: 0 % (ref 0–2)
BASOPHILS NFR BLD: 1 % (ref 0–2)
BDY SITE: ABNORMAL
BILIRUB SERPL-MCNC: 0.3 MG/DL (ref 0.2–1.1)
BILIRUB SERPL-MCNC: 0.5 MG/DL (ref 0.2–1.1)
BILIRUB SERPL-MCNC: 0.5 MG/DL (ref 0.2–1.1)
BILIRUB UR QL: NEGATIVE
BNP SERPL-MCNC: 2560 PG/ML
BUN SERPL-MCNC: 18 MG/DL (ref 8–23)
BUN SERPL-MCNC: 22 MG/DL (ref 8–23)
BUN SERPL-MCNC: 22 MG/DL (ref 8–23)
BUN SERPL-MCNC: 24 MG/DL (ref 8–23)
BUN SERPL-MCNC: 40 MG/DL (ref 8–23)
BUN SERPL-MCNC: 45 MG/DL (ref 8–23)
BUN SERPL-MCNC: 58 MG/DL (ref 8–23)
BUN SERPL-MCNC: 74 MG/DL (ref 8–23)
BUN SERPL-MCNC: 75 MG/DL (ref 8–23)
CALCIUM SERPL-MCNC: 7.6 MG/DL (ref 8.3–10.4)
CALCIUM SERPL-MCNC: 7.6 MG/DL (ref 8.3–10.4)
CALCIUM SERPL-MCNC: 8.5 MG/DL (ref 8.3–10.4)
CALCIUM SERPL-MCNC: 8.7 MG/DL (ref 8.3–10.4)
CALCIUM SERPL-MCNC: 8.9 MG/DL (ref 8.3–10.4)
CALCIUM SERPL-MCNC: 9.2 MG/DL (ref 8.3–10.4)
CALCIUM SERPL-MCNC: 9.5 MG/DL (ref 8.3–10.4)
CALCULATED R AXIS, ECG10: 76 DEGREES
CALCULATED R AXIS, ECG10: 99 DEGREES
CALCULATED T AXIS, ECG11: 71 DEGREES
CALCULATED T AXIS, ECG11: 79 DEGREES
CASTS URNS QL MICRO: 0 /LPF
CHLORIDE SERPL-SCNC: 104 MMOL/L (ref 98–107)
CHLORIDE SERPL-SCNC: 105 MMOL/L (ref 98–107)
CHLORIDE SERPL-SCNC: 106 MMOL/L (ref 98–107)
CHLORIDE SERPL-SCNC: 108 MMOL/L (ref 98–107)
CHLORIDE SERPL-SCNC: 108 MMOL/L (ref 98–107)
CHLORIDE SERPL-SCNC: 112 MMOL/L (ref 98–107)
CHLORIDE SERPL-SCNC: 113 MMOL/L (ref 98–107)
CHLORIDE SERPL-SCNC: 114 MMOL/L (ref 98–107)
CHLORIDE SERPL-SCNC: 114 MMOL/L (ref 98–107)
CHOLEST SERPL-MCNC: 143 MG/DL
CK SERPL-CCNC: 1235 U/L (ref 21–215)
CK SERPL-CCNC: 1391 U/L (ref 21–215)
CK SERPL-CCNC: 593 U/L (ref 21–215)
CO2 BLD-SCNC: 10 MMOL/L
CO2 BLD-SCNC: 11 MMOL/L
CO2 BLD-SCNC: 26 MMOL/L
CO2 SERPL-SCNC: 12 MMOL/L (ref 21–32)
CO2 SERPL-SCNC: 19 MMOL/L (ref 21–32)
CO2 SERPL-SCNC: 24 MMOL/L (ref 21–32)
CO2 SERPL-SCNC: 26 MMOL/L (ref 21–32)
CO2 SERPL-SCNC: 26 MMOL/L (ref 21–32)
CO2 SERPL-SCNC: 27 MMOL/L (ref 21–32)
CO2 SERPL-SCNC: 29 MMOL/L (ref 21–32)
COLLECT TIME,HTIME: 1742
COLLECT TIME,HTIME: 330
COLLECT TIME,HTIME: 750
COLOR UR: YELLOW
CREAT SERPL-MCNC: 0.97 MG/DL (ref 0.8–1.5)
CREAT SERPL-MCNC: 1.09 MG/DL (ref 0.8–1.5)
CREAT SERPL-MCNC: 1.12 MG/DL (ref 0.8–1.5)
CREAT SERPL-MCNC: 1.13 MG/DL (ref 0.8–1.5)
CREAT SERPL-MCNC: 1.43 MG/DL (ref 0.8–1.5)
CREAT SERPL-MCNC: 2.51 MG/DL (ref 0.8–1.5)
CREAT SERPL-MCNC: 3.71 MG/DL (ref 0.8–1.5)
CREAT SERPL-MCNC: 4.64 MG/DL (ref 0.8–1.5)
CREAT SERPL-MCNC: 5.15 MG/DL (ref 0.8–1.5)
CREAT UR-MCNC: 219 MG/DL
CREAT UR-MCNC: 223 MG/DL
DIAGNOSIS, 93000: NORMAL
DIAGNOSIS, 93000: NORMAL
DIFFERENTIAL METHOD BLD: ABNORMAL
EOSINOPHIL # BLD: 0 K/UL (ref 0–0.8)
EOSINOPHIL # BLD: 0 K/UL (ref 0–0.8)
EOSINOPHIL # BLD: 0.2 K/UL (ref 0–0.8)
EOSINOPHIL # BLD: 0.2 K/UL (ref 0–0.8)
EOSINOPHIL # BLD: 0.3 K/UL (ref 0–0.8)
EOSINOPHIL # BLD: 0.5 K/UL (ref 0–0.8)
EOSINOPHIL # BLD: 0.5 K/UL (ref 0–0.8)
EOSINOPHIL NFR BLD MANUAL: 1 % (ref 1–8)
EOSINOPHIL NFR BLD: 0 % (ref 0.5–7.8)
EOSINOPHIL NFR BLD: 0 % (ref 0.5–7.8)
EOSINOPHIL NFR BLD: 2 % (ref 0.5–7.8)
EOSINOPHIL NFR BLD: 3 % (ref 0.5–7.8)
EOSINOPHIL NFR BLD: 4 % (ref 0.5–7.8)
EOSINOPHIL NFR BLD: 7 % (ref 0.5–7.8)
EOSINOPHIL NFR BLD: 8 % (ref 0.5–7.8)
EPI CELLS #/AREA URNS HPF: ABNORMAL /HPF
ERYTHROCYTE [DISTWIDTH] IN BLOOD BY AUTOMATED COUNT: 13.5 % (ref 11.9–14.6)
ERYTHROCYTE [DISTWIDTH] IN BLOOD BY AUTOMATED COUNT: 13.6 % (ref 11.9–14.6)
ERYTHROCYTE [DISTWIDTH] IN BLOOD BY AUTOMATED COUNT: 14 % (ref 11.9–14.6)
ERYTHROCYTE [DISTWIDTH] IN BLOOD BY AUTOMATED COUNT: 14 % (ref 11.9–14.6)
ERYTHROCYTE [DISTWIDTH] IN BLOOD BY AUTOMATED COUNT: 14.4 % (ref 11.9–14.6)
ERYTHROCYTE [DISTWIDTH] IN BLOOD BY AUTOMATED COUNT: 14.5 % (ref 11.9–14.6)
EST. AVERAGE GLUCOSE BLD GHB EST-MCNC: 140 MG/DL
EXHALED MINUTE VOLUME, VE: 11.3 L/MIN
EXHALED MINUTE VOLUME, VE: 14.2 L/MIN
EXHALED MINUTE VOLUME, VE: 15.8 L/MIN
GAS FLOW.O2 O2 DELIVERY SYS: ABNORMAL L/MIN
GAS FLOW.O2 SETTING OXYMISER: 15 BPM
GAS FLOW.O2 SETTING OXYMISER: 20 BPM
GLOBULIN SER CALC-MCNC: 2.3 G/DL (ref 2.3–3.5)
GLOBULIN SER CALC-MCNC: 3 G/DL (ref 2.3–3.5)
GLOBULIN SER CALC-MCNC: 3.2 G/DL (ref 2.3–3.5)
GLUCOSE BLD STRIP.AUTO-MCNC: 125 MG/DL (ref 65–100)
GLUCOSE BLD STRIP.AUTO-MCNC: 130 MG/DL (ref 65–100)
GLUCOSE BLD STRIP.AUTO-MCNC: 163 MG/DL (ref 65–100)
GLUCOSE SERPL-MCNC: 120 MG/DL (ref 65–100)
GLUCOSE SERPL-MCNC: 150 MG/DL (ref 65–100)
GLUCOSE SERPL-MCNC: 178 MG/DL (ref 65–100)
GLUCOSE SERPL-MCNC: 200 MG/DL (ref 65–100)
GLUCOSE SERPL-MCNC: 64 MG/DL (ref 65–100)
GLUCOSE SERPL-MCNC: 92 MG/DL (ref 65–100)
GLUCOSE SERPL-MCNC: 95 MG/DL (ref 65–100)
GLUCOSE SERPL-MCNC: 97 MG/DL (ref 65–100)
GLUCOSE SERPL-MCNC: 98 MG/DL (ref 65–100)
GLUCOSE UR STRIP.AUTO-MCNC: NEGATIVE MG/DL
HBA1C MFR BLD: 6.5 % (ref 4.8–6)
HCO3 BLD-SCNC: 10 MMOL/L (ref 22–26)
HCO3 BLD-SCNC: 24.5 MMOL/L (ref 22–26)
HCO3 BLD-SCNC: 9 MMOL/L (ref 22–26)
HCT VFR BLD AUTO: 21.9 % (ref 41.1–50.3)
HCT VFR BLD AUTO: 24.4 % (ref 41.1–50.3)
HCT VFR BLD AUTO: 26.4 % (ref 41.1–50.3)
HCT VFR BLD AUTO: 28.2 % (ref 41.1–50.3)
HCT VFR BLD AUTO: 33.2 % (ref 41.1–50.3)
HCT VFR BLD AUTO: 39.5 % (ref 41.1–50.3)
HCT VFR BLD AUTO: 39.7 % (ref 41.1–50.3)
HCT VFR BLD AUTO: 40.3 % (ref 41.1–50.3)
HCT VFR BLD AUTO: 40.5 % (ref 41.1–50.3)
HCT VFR BLD AUTO: 42.4 % (ref 41.1–50.3)
HCT VFR BLD AUTO: 42.5 % (ref 41.1–50.3)
HDLC SERPL-MCNC: 53 MG/DL (ref 40–60)
HDLC SERPL: 2.7 {RATIO}
HGB BLD-MCNC: 10.7 G/DL (ref 13.6–17.2)
HGB BLD-MCNC: 13 G/DL (ref 13.6–17.2)
HGB BLD-MCNC: 13.2 G/DL (ref 13.6–17.2)
HGB BLD-MCNC: 13.2 G/DL (ref 13.6–17.2)
HGB BLD-MCNC: 13.5 G/DL (ref 13.6–17.2)
HGB BLD-MCNC: 13.6 G/DL (ref 13.6–17.2)
HGB BLD-MCNC: 13.8 G/DL (ref 13.6–17.2)
HGB BLD-MCNC: 6.5 G/DL (ref 13.6–17.2)
HGB BLD-MCNC: 7.3 G/DL (ref 13.6–17.2)
HGB BLD-MCNC: 8.1 G/DL (ref 13.6–17.2)
HGB BLD-MCNC: 9.1 G/DL (ref 13.6–17.2)
HGB UR QL STRIP: NEGATIVE
IMM GRANULOCYTES # BLD AUTO: 0 K/UL (ref 0–0.5)
IMM GRANULOCYTES NFR BLD AUTO: 0 % (ref 0–5)
INR PPP: 1.1
INR PPP: 1.2
KETONES UR QL STRIP.AUTO: ABNORMAL MG/DL
LACTATE SERPL-SCNC: 22.6 MMOL/L (ref 0.4–2)
LDLC SERPL CALC-MCNC: 68.8 MG/DL
LEUKOCYTE ESTERASE UR QL STRIP.AUTO: NEGATIVE
LIPID PROFILE,FLP: NORMAL
LYMPHOCYTES # BLD: 1 K/UL (ref 0.5–4.6)
LYMPHOCYTES # BLD: 1.1 K/UL (ref 0.5–4.6)
LYMPHOCYTES # BLD: 1.2 K/UL (ref 0.5–4.6)
LYMPHOCYTES # BLD: 1.5 K/UL (ref 0.5–4.6)
LYMPHOCYTES # BLD: 1.6 K/UL (ref 0.5–4.6)
LYMPHOCYTES NFR BLD MANUAL: 52 % (ref 16–44)
LYMPHOCYTES NFR BLD: 10 % (ref 13–44)
LYMPHOCYTES NFR BLD: 16 % (ref 13–44)
LYMPHOCYTES NFR BLD: 16 % (ref 13–44)
LYMPHOCYTES NFR BLD: 19 % (ref 13–44)
LYMPHOCYTES NFR BLD: 19 % (ref 13–44)
LYMPHOCYTES NFR BLD: 23 % (ref 13–44)
LYMPHOCYTES NFR BLD: 24 % (ref 13–44)
MCH RBC QN AUTO: 31 PG (ref 26.1–32.9)
MCH RBC QN AUTO: 31.5 PG (ref 26.1–32.9)
MCH RBC QN AUTO: 31.9 PG (ref 26.1–32.9)
MCH RBC QN AUTO: 32 PG (ref 26.1–32.9)
MCH RBC QN AUTO: 32 PG (ref 26.1–32.9)
MCH RBC QN AUTO: 32.1 PG (ref 26.1–32.9)
MCH RBC QN AUTO: 32.5 PG (ref 26.1–32.9)
MCH RBC QN AUTO: 32.7 PG (ref 26.1–32.9)
MCHC RBC AUTO-ENTMCNC: 29.7 G/DL (ref 31.4–35)
MCHC RBC AUTO-ENTMCNC: 30.7 G/DL (ref 31.4–35)
MCHC RBC AUTO-ENTMCNC: 32 G/DL (ref 31.4–35)
MCHC RBC AUTO-ENTMCNC: 32.2 G/DL (ref 31.4–35)
MCHC RBC AUTO-ENTMCNC: 32.3 G/DL (ref 31.4–35)
MCHC RBC AUTO-ENTMCNC: 32.5 G/DL (ref 31.4–35)
MCHC RBC AUTO-ENTMCNC: 32.6 G/DL (ref 31.4–35)
MCHC RBC AUTO-ENTMCNC: 32.7 G/DL (ref 31.4–35)
MCHC RBC AUTO-ENTMCNC: 33.4 G/DL (ref 31.4–35)
MCHC RBC AUTO-ENTMCNC: 33.5 G/DL (ref 31.4–35)
MCV RBC AUTO: 101.1 FL (ref 79.6–97.8)
MCV RBC AUTO: 107.9 FL (ref 79.6–97.8)
MCV RBC AUTO: 96.9 FL (ref 79.6–97.8)
MCV RBC AUTO: 97.3 FL (ref 79.6–97.8)
MCV RBC AUTO: 97.6 FL (ref 79.6–97.8)
MCV RBC AUTO: 97.6 FL (ref 79.6–97.8)
MCV RBC AUTO: 97.8 FL (ref 79.6–97.8)
MCV RBC AUTO: 98.1 FL (ref 79.6–97.8)
MCV RBC AUTO: 98.4 FL (ref 79.6–97.8)
MCV RBC AUTO: 98.6 FL (ref 79.6–97.8)
MM INDURATION POC: 0 MM (ref 0–5)
MONOCYTES # BLD: 0.1 K/UL (ref 0.1–1.3)
MONOCYTES # BLD: 0.6 K/UL (ref 0.1–1.3)
MONOCYTES # BLD: 0.7 K/UL (ref 0.1–1.3)
MONOCYTES # BLD: 1.3 K/UL (ref 0.1–1.3)
MONOCYTES NFR BLD MANUAL: 9 % (ref 3–9)
MONOCYTES NFR BLD: 10 % (ref 4–12)
MONOCYTES NFR BLD: 10 % (ref 4–12)
MONOCYTES NFR BLD: 12 % (ref 4–12)
MONOCYTES NFR BLD: 2 % (ref 4–12)
MONOCYTES NFR BLD: 9 % (ref 4–12)
NEUTS BAND NFR BLD MANUAL: 27 % (ref 0–10)
NEUTS SEG # BLD: 4 K/UL (ref 1.7–8.2)
NEUTS SEG # BLD: 4.1 K/UL (ref 1.7–8.2)
NEUTS SEG # BLD: 4.1 K/UL (ref 1.7–8.2)
NEUTS SEG # BLD: 4.2 K/UL (ref 1.7–8.2)
NEUTS SEG # BLD: 4.2 K/UL (ref 1.7–8.2)
NEUTS SEG # BLD: 4.6 K/UL (ref 1.7–8.2)
NEUTS SEG # BLD: 8.1 K/UL (ref 1.7–8.2)
NEUTS SEG NFR BLD MANUAL: 11 % (ref 47–75)
NEUTS SEG NFR BLD: 63 % (ref 43–78)
NEUTS SEG NFR BLD: 64 % (ref 43–78)
NEUTS SEG NFR BLD: 64 % (ref 43–78)
NEUTS SEG NFR BLD: 68 % (ref 43–78)
NEUTS SEG NFR BLD: 68 % (ref 43–78)
NEUTS SEG NFR BLD: 77 % (ref 43–78)
NEUTS SEG NFR BLD: 79 % (ref 43–78)
NITRITE UR QL STRIP.AUTO: NEGATIVE
NRBC # BLD: 0 K/UL (ref 0–0.2)
NRBC # BLD: 0.11 K/UL (ref 0–0.2)
NRBC BLD-RTO: 3 PER 100 WBC
O2/TOTAL GAS SETTING VFR VENT: 100 %
O2/TOTAL GAS SETTING VFR VENT: 50 %
O2/TOTAL GAS SETTING VFR VENT: 50 %
PCO2 BLD: 37.7 MMHG (ref 35–45)
PCO2 BLD: 38.7 MMHG (ref 35–45)
PCO2 BLD: 55.8 MMHG (ref 35–45)
PEEP RESPIRATORY: 8 CMH2O
PH BLD: 6.98 [PH] (ref 7.35–7.45)
PH BLD: 7.03 [PH] (ref 7.35–7.45)
PH BLD: 7.25 [PH] (ref 7.35–7.45)
PH UR STRIP: 5.5 [PH] (ref 5–9)
PLATELET # BLD AUTO: 154 K/UL (ref 150–450)
PLATELET # BLD AUTO: 162 K/UL (ref 150–450)
PLATELET # BLD AUTO: 165 K/UL (ref 150–450)
PLATELET # BLD AUTO: 166 K/UL (ref 150–450)
PLATELET # BLD AUTO: 169 K/UL (ref 150–450)
PLATELET # BLD AUTO: 177 K/UL (ref 150–450)
PLATELET # BLD AUTO: 182 K/UL (ref 150–450)
PLATELET # BLD AUTO: 195 K/UL (ref 150–450)
PLATELET # BLD AUTO: 207 K/UL (ref 150–450)
PLATELET # BLD AUTO: 249 K/UL (ref 150–450)
PLATELET COMMENTS,PCOM: ADEQUATE
PLATELET COMMENTS,PCOM: ADEQUATE
PMV BLD AUTO: 11.5 FL (ref 9.4–12.3)
PMV BLD AUTO: 11.6 FL (ref 9.4–12.3)
PMV BLD AUTO: 12.1 FL (ref 9.4–12.3)
PMV BLD AUTO: 12.4 FL (ref 9.4–12.3)
PMV BLD AUTO: 12.5 FL (ref 9.4–12.3)
PMV BLD AUTO: 12.5 FL (ref 9.4–12.3)
PMV BLD AUTO: 12.6 FL (ref 9.4–12.3)
PMV BLD AUTO: 12.6 FL (ref 9.4–12.3)
PO2 BLD: 64 MMHG (ref 75–100)
PO2 BLD: 82 MMHG (ref 75–100)
PO2 BLD: 97 MMHG (ref 75–100)
POTASSIUM SERPL-SCNC: 3.8 MMOL/L (ref 3.5–5.1)
POTASSIUM SERPL-SCNC: 3.8 MMOL/L (ref 3.5–5.1)
POTASSIUM SERPL-SCNC: 3.9 MMOL/L (ref 3.5–5.1)
POTASSIUM SERPL-SCNC: 4 MMOL/L (ref 3.5–5.1)
POTASSIUM SERPL-SCNC: 4.7 MMOL/L (ref 3.5–5.1)
POTASSIUM SERPL-SCNC: 5 MMOL/L (ref 3.5–5.1)
POTASSIUM SERPL-SCNC: 5.2 MMOL/L (ref 3.5–5.1)
POTASSIUM SERPL-SCNC: 5.5 MMOL/L (ref 3.5–5.1)
POTASSIUM SERPL-SCNC: 5.8 MMOL/L (ref 3.5–5.1)
POTASSIUM SERPL-SCNC: 6.3 MMOL/L (ref 3.5–5.1)
PPD POC: NEGATIVE NEGATIVE
PRESSURE CONTROL, IPC: 10
PRESSURE CONTROL, IPC: 15
PRESSURE SUPPORT SETTING VENT: 5 CMH2O
PROCALCITONIN SERPL-MCNC: 27.3 NG/ML
PROT SERPL-MCNC: 4.1 G/DL (ref 6.3–8.2)
PROT SERPL-MCNC: 6.6 G/DL (ref 6.3–8.2)
PROT SERPL-MCNC: 6.6 G/DL (ref 6.3–8.2)
PROT UR STRIP-MCNC: 30 MG/DL
PROT UR-MCNC: 56 MG/DL
PROT/CREAT UR-RTO: 0.3
PROTHROMBIN TIME: 14.2 SEC (ref 11.7–14.5)
PROTHROMBIN TIME: 15.7 SEC (ref 12–14.7)
Q-T INTERVAL, ECG07: 368 MS
Q-T INTERVAL, ECG07: 420 MS
QRS DURATION, ECG06: 86 MS
QRS DURATION, ECG06: 96 MS
QTC CALCULATION (BEZET), ECG08: 421 MS
QTC CALCULATION (BEZET), ECG08: 426 MS
RBC # BLD AUTO: 2.03 M/UL (ref 4.23–5.6)
RBC # BLD AUTO: 2.61 M/UL (ref 4.23–5.6)
RBC # BLD AUTO: 2.89 M/UL (ref 4.23–5.6)
RBC # BLD AUTO: 3.4 M/UL (ref 4.23–5.6)
RBC # BLD AUTO: 4.04 M/UL (ref 4.23–5.6)
RBC # BLD AUTO: 4.08 M/UL (ref 4.23–5.6)
RBC # BLD AUTO: 4.13 M/UL (ref 4.23–5.6)
RBC # BLD AUTO: 4.16 M/UL (ref 4.23–5.6)
RBC # BLD AUTO: 4.3 M/UL (ref 4.23–5.6)
RBC # BLD AUTO: 4.32 M/UL (ref 4.23–5.6)
RBC #/AREA URNS HPF: ABNORMAL /HPF
RBC MORPH BLD: ABNORMAL
SAO2 % BLD: 81 % (ref 95–98)
SAO2 % BLD: 92 % (ref 95–98)
SAO2 % BLD: 94 % (ref 95–98)
SERVICE CMNT-IMP: ABNORMAL
SODIUM SERPL-SCNC: 140 MMOL/L (ref 136–145)
SODIUM SERPL-SCNC: 143 MMOL/L (ref 136–145)
SODIUM SERPL-SCNC: 145 MMOL/L (ref 136–145)
SODIUM SERPL-SCNC: 146 MMOL/L (ref 136–145)
SODIUM SERPL-SCNC: 147 MMOL/L (ref 136–145)
SODIUM SERPL-SCNC: 147 MMOL/L (ref 136–145)
SODIUM SERPL-SCNC: 148 MMOL/L (ref 136–145)
SODIUM UR-SCNC: 37 MMOL/L
SP GR UR REFRACTOMETRY: 1.02 (ref 1–1.02)
SPECIMEN TYPE: ABNORMAL
TOTAL RESP. RATE, ITRR: 39
TRIGL SERPL-MCNC: 106 MG/DL (ref 35–150)
TROPONIN I SERPL-MCNC: 0.14 NG/ML (ref 0.02–0.05)
UROBILINOGEN UR QL STRIP.AUTO: 0.2 EU/DL (ref 0.2–1)
VENTILATION MODE VENT: ABNORMAL
VENTRICULAR RATE, ECG03: 62 BPM
VENTRICULAR RATE, ECG03: 79 BPM
VLDLC SERPL CALC-MCNC: 21.2 MG/DL (ref 6–23)
WBC # BLD AUTO: 10.5 K/UL (ref 4.3–11.1)
WBC # BLD AUTO: 12.5 K/UL (ref 4.3–11.1)
WBC # BLD AUTO: 3.8 K/UL (ref 4.3–11.1)
WBC # BLD AUTO: 5.2 K/UL (ref 4.3–11.1)
WBC # BLD AUTO: 6 K/UL (ref 4.3–11.1)
WBC # BLD AUTO: 6.5 K/UL (ref 4.3–11.1)
WBC # BLD AUTO: 6.5 K/UL (ref 4.3–11.1)
WBC # BLD AUTO: 6.7 K/UL (ref 4.3–11.1)
WBC # BLD AUTO: 6.8 K/UL (ref 4.3–11.1)
WBC # BLD AUTO: 7.9 K/UL (ref 4.3–11.1)
WBC MORPH BLD: ABNORMAL
WBC MORPH BLD: ABNORMAL
WBC URNS QL MICRO: ABNORMAL /HPF

## 2020-01-01 PROCEDURE — 74011000258 HC RX REV CODE- 258: Performed by: HOSPITALIST

## 2020-01-01 PROCEDURE — C1713 ANCHOR/SCREW BN/BN,TIS/BN: HCPCS | Performed by: ORTHOPAEDIC SURGERY

## 2020-01-01 PROCEDURE — P9016 RBC LEUKOCYTES REDUCED: HCPCS

## 2020-01-01 PROCEDURE — 93005 ELECTROCARDIOGRAM TRACING: CPT | Performed by: EMERGENCY MEDICINE

## 2020-01-01 PROCEDURE — 77030039425 HC BLD LARYNG TRULITE DISP TELE -A: Performed by: ANESTHESIOLOGY

## 2020-01-01 PROCEDURE — 76775 US EXAM ABDO BACK WALL LIM: CPT

## 2020-01-01 PROCEDURE — 94640 AIRWAY INHALATION TREATMENT: CPT

## 2020-01-01 PROCEDURE — 82550 ASSAY OF CK (CPK): CPT

## 2020-01-01 PROCEDURE — 77030017016 HC DSG ANTIMIC BARR2 S&N -B: Performed by: ORTHOPAEDIC SURGERY

## 2020-01-01 PROCEDURE — 85025 COMPLETE CBC W/AUTO DIFF WBC: CPT

## 2020-01-01 PROCEDURE — 74011250636 HC RX REV CODE- 250/636: Performed by: ORTHOPAEDIC SURGERY

## 2020-01-01 PROCEDURE — 74011250636 HC RX REV CODE- 250/636: Performed by: NURSE ANESTHETIST, CERTIFIED REGISTERED

## 2020-01-01 PROCEDURE — 70544 MR ANGIOGRAPHY HEAD W/O DYE: CPT

## 2020-01-01 PROCEDURE — 73030 X-RAY EXAM OF SHOULDER: CPT

## 2020-01-01 PROCEDURE — 85018 HEMOGLOBIN: CPT

## 2020-01-01 PROCEDURE — 81003 URINALYSIS AUTO W/O SCOPE: CPT

## 2020-01-01 PROCEDURE — 74011000302 HC RX REV CODE- 302: Performed by: NURSE PRACTITIONER

## 2020-01-01 PROCEDURE — 74011250637 HC RX REV CODE- 250/637: Performed by: NURSE PRACTITIONER

## 2020-01-01 PROCEDURE — 82803 BLOOD GASES ANY COMBINATION: CPT

## 2020-01-01 PROCEDURE — 77030033067 HC SUT PDO STRATFX SPIR J&J -B: Performed by: ORTHOPAEDIC SURGERY

## 2020-01-01 PROCEDURE — 71045 X-RAY EXAM CHEST 1 VIEW: CPT

## 2020-01-01 PROCEDURE — 85027 COMPLETE CBC AUTOMATED: CPT

## 2020-01-01 PROCEDURE — 97116 GAIT TRAINING THERAPY: CPT

## 2020-01-01 PROCEDURE — 83880 ASSAY OF NATRIURETIC PEPTIDE: CPT

## 2020-01-01 PROCEDURE — 74011250636 HC RX REV CODE- 250/636: Performed by: INTERNAL MEDICINE

## 2020-01-01 PROCEDURE — 87040 BLOOD CULTURE FOR BACTERIA: CPT

## 2020-01-01 PROCEDURE — C1751 CATH, INF, PER/CENT/MIDLINE: HCPCS

## 2020-01-01 PROCEDURE — 36430 TRANSFUSION BLD/BLD COMPNT: CPT

## 2020-01-01 PROCEDURE — 76210000016 HC OR PH I REC 1 TO 1.5 HR: Performed by: ORTHOPAEDIC SURGERY

## 2020-01-01 PROCEDURE — 92610 EVALUATE SWALLOWING FUNCTION: CPT

## 2020-01-01 PROCEDURE — 70450 CT HEAD/BRAIN W/O DYE: CPT

## 2020-01-01 PROCEDURE — 05HY33Z INSERTION OF INFUSION DEVICE INTO UPPER VEIN, PERCUTANEOUS APPROACH: ICD-10-PCS | Performed by: INTERNAL MEDICINE

## 2020-01-01 PROCEDURE — 77030021678 HC GLIDESCP STAT DISP VERT -B: Performed by: ANESTHESIOLOGY

## 2020-01-01 PROCEDURE — 86923 COMPATIBILITY TEST ELECTRIC: CPT

## 2020-01-01 PROCEDURE — 95816 EEG AWAKE AND DROWSY: CPT | Performed by: NURSE PRACTITIONER

## 2020-01-01 PROCEDURE — 77030041247 HC PROTECTOR HEEL HEELMEDIX MDII -B

## 2020-01-01 PROCEDURE — 77030040922 HC BLNKT HYPOTHRM STRY -A: Performed by: ANESTHESIOLOGY

## 2020-01-01 PROCEDURE — 36415 COLL VENOUS BLD VENIPUNCTURE: CPT

## 2020-01-01 PROCEDURE — 99291 CRITICAL CARE FIRST HOUR: CPT | Performed by: INTERNAL MEDICINE

## 2020-01-01 PROCEDURE — 73502 X-RAY EXAM HIP UNI 2-3 VIEWS: CPT

## 2020-01-01 PROCEDURE — 99223 1ST HOSP IP/OBS HIGH 75: CPT | Performed by: INTERNAL MEDICINE

## 2020-01-01 PROCEDURE — 86580 TB INTRADERMAL TEST: CPT | Performed by: NURSE PRACTITIONER

## 2020-01-01 PROCEDURE — 92526 ORAL FUNCTION THERAPY: CPT

## 2020-01-01 PROCEDURE — 74011000250 HC RX REV CODE- 250: Performed by: NURSE ANESTHETIST, CERTIFIED REGISTERED

## 2020-01-01 PROCEDURE — A4565 SLINGS: HCPCS

## 2020-01-01 PROCEDURE — 74011250637 HC RX REV CODE- 250/637: Performed by: HOSPITALIST

## 2020-01-01 PROCEDURE — 36600 WITHDRAWAL OF ARTERIAL BLOOD: CPT

## 2020-01-01 PROCEDURE — 74011000258 HC RX REV CODE- 258: Performed by: NURSE PRACTITIONER

## 2020-01-01 PROCEDURE — 77030018673: Performed by: ORTHOPAEDIC SURGERY

## 2020-01-01 PROCEDURE — 74011000258 HC RX REV CODE- 258: Performed by: ORTHOPAEDIC SURGERY

## 2020-01-01 PROCEDURE — 74011250636 HC RX REV CODE- 250/636: Performed by: HOSPITALIST

## 2020-01-01 PROCEDURE — 99232 SBSQ HOSP IP/OBS MODERATE 35: CPT | Performed by: NURSE PRACTITIONER

## 2020-01-01 PROCEDURE — 74011000250 HC RX REV CODE- 250: Performed by: HOSPITALIST

## 2020-01-01 PROCEDURE — 80048 BASIC METABOLIC PNL TOTAL CA: CPT

## 2020-01-01 PROCEDURE — 97162 PT EVAL MOD COMPLEX 30 MIN: CPT

## 2020-01-01 PROCEDURE — 74011000258 HC RX REV CODE- 258: Performed by: INTERNAL MEDICINE

## 2020-01-01 PROCEDURE — 84145 PROCALCITONIN (PCT): CPT

## 2020-01-01 PROCEDURE — 96374 THER/PROPH/DIAG INJ IV PUSH: CPT

## 2020-01-01 PROCEDURE — 97535 SELF CARE MNGMENT TRAINING: CPT

## 2020-01-01 PROCEDURE — 93306 TTE W/DOPPLER COMPLETE: CPT

## 2020-01-01 PROCEDURE — 73552 X-RAY EXAM OF FEMUR 2/>: CPT

## 2020-01-01 PROCEDURE — 87070 CULTURE OTHR SPECIMN AEROBIC: CPT

## 2020-01-01 PROCEDURE — L1830 KO IMMOB CANVAS LONG PRE OTS: HCPCS | Performed by: ORTHOPAEDIC SURGERY

## 2020-01-01 PROCEDURE — 99222 1ST HOSP IP/OBS MODERATE 55: CPT | Performed by: PHYSICAL MEDICINE & REHABILITATION

## 2020-01-01 PROCEDURE — 74011250636 HC RX REV CODE- 250/636: Performed by: ANESTHESIOLOGY

## 2020-01-01 PROCEDURE — 74011000250 HC RX REV CODE- 250: Performed by: INTERNAL MEDICINE

## 2020-01-01 PROCEDURE — 65660000000 HC RM CCU STEPDOWN

## 2020-01-01 PROCEDURE — 94002 VENT MGMT INPAT INIT DAY: CPT

## 2020-01-01 PROCEDURE — 36556 INSERT NON-TUNNEL CV CATH: CPT | Performed by: INTERNAL MEDICINE

## 2020-01-01 PROCEDURE — 80053 COMPREHEN METABOLIC PANEL: CPT

## 2020-01-01 PROCEDURE — 97112 NEUROMUSCULAR REEDUCATION: CPT

## 2020-01-01 PROCEDURE — 82962 GLUCOSE BLOOD TEST: CPT

## 2020-01-01 PROCEDURE — 77030008771 HC TU NG SALEM SUMP -A: Performed by: ANESTHESIOLOGY

## 2020-01-01 PROCEDURE — 5A1935Z RESPIRATORY VENTILATION, LESS THAN 24 CONSECUTIVE HOURS: ICD-10-PCS | Performed by: ORTHOPAEDIC SURGERY

## 2020-01-01 PROCEDURE — 77030040830 HC CATH URETH FOL MDII -A

## 2020-01-01 PROCEDURE — 99239 HOSP IP/OBS DSCHRG MGMT >30: CPT | Performed by: INTERNAL MEDICINE

## 2020-01-01 PROCEDURE — 84156 ASSAY OF PROTEIN URINE: CPT

## 2020-01-01 PROCEDURE — 74011000302 HC RX REV CODE- 302: Performed by: INTERNAL MEDICINE

## 2020-01-01 PROCEDURE — 74230 X-RAY XM SWLNG FUNCJ C+: CPT

## 2020-01-01 PROCEDURE — 74011000255 HC RX REV CODE- 255: Performed by: INTERNAL MEDICINE

## 2020-01-01 PROCEDURE — 74011250636 HC RX REV CODE- 250/636: Performed by: FAMILY MEDICINE

## 2020-01-01 PROCEDURE — 77030018836 HC SOL IRR NACL ICUM -A: Performed by: ORTHOPAEDIC SURGERY

## 2020-01-01 PROCEDURE — 84484 ASSAY OF TROPONIN QUANT: CPT

## 2020-01-01 PROCEDURE — 99218 HC RM OBSERVATION: CPT

## 2020-01-01 PROCEDURE — 74011250636 HC RX REV CODE- 250/636: Performed by: EMERGENCY MEDICINE

## 2020-01-01 PROCEDURE — 82570 ASSAY OF URINE CREATININE: CPT

## 2020-01-01 PROCEDURE — 73060 X-RAY EXAM OF HUMERUS: CPT

## 2020-01-01 PROCEDURE — 86900 BLOOD TYPING SEROLOGIC ABO: CPT

## 2020-01-01 PROCEDURE — P9047 ALBUMIN (HUMAN), 25%, 50ML: HCPCS | Performed by: INTERNAL MEDICINE

## 2020-01-01 PROCEDURE — 0QS706Z REPOSITION LEFT UPPER FEMUR WITH INTRAMEDULLARY INTERNAL FIXATION DEVICE, OPEN APPROACH: ICD-10-PCS | Performed by: ORTHOPAEDIC SURGERY

## 2020-01-01 PROCEDURE — 99232 SBSQ HOSP IP/OBS MODERATE 35: CPT | Performed by: PSYCHIATRY & NEUROLOGY

## 2020-01-01 PROCEDURE — 86580 TB INTRADERMAL TEST: CPT | Performed by: INTERNAL MEDICINE

## 2020-01-01 PROCEDURE — 77030037088 HC TUBE ENDOTRACH ORAL NSL COVD-A: Performed by: ANESTHESIOLOGY

## 2020-01-01 PROCEDURE — 80061 LIPID PANEL: CPT

## 2020-01-01 PROCEDURE — 70496 CT ANGIOGRAPHY HEAD: CPT

## 2020-01-01 PROCEDURE — 77030027138 HC INCENT SPIROMETER -A

## 2020-01-01 PROCEDURE — 36556 INSERT NON-TUNNEL CV CATH: CPT

## 2020-01-01 PROCEDURE — 0PSDXZZ REPOSITION LEFT HUMERAL HEAD, EXTERNAL APPROACH: ICD-10-PCS | Performed by: ORTHOPAEDIC SURGERY

## 2020-01-01 PROCEDURE — 74011636320 HC RX REV CODE- 636/320: Performed by: HOSPITALIST

## 2020-01-01 PROCEDURE — 97166 OT EVAL MOD COMPLEX 45 MIN: CPT

## 2020-01-01 PROCEDURE — 97530 THERAPEUTIC ACTIVITIES: CPT

## 2020-01-01 PROCEDURE — 65610000006 HC RM INTENSIVE CARE

## 2020-01-01 PROCEDURE — C1769 GUIDE WIRE: HCPCS | Performed by: ORTHOPAEDIC SURGERY

## 2020-01-01 PROCEDURE — 70551 MRI BRAIN STEM W/O DYE: CPT

## 2020-01-01 PROCEDURE — 99285 EMERGENCY DEPT VISIT HI MDM: CPT

## 2020-01-01 PROCEDURE — 99204 OFFICE O/P NEW MOD 45 MIN: CPT | Performed by: PSYCHIATRY & NEUROLOGY

## 2020-01-01 PROCEDURE — 74011250637 HC RX REV CODE- 250/637: Performed by: INTERNAL MEDICINE

## 2020-01-01 PROCEDURE — 76060000034 HC ANESTHESIA 1.5 TO 2 HR: Performed by: ORTHOPAEDIC SURGERY

## 2020-01-01 PROCEDURE — 95816 EEG AWAKE AND DROWSY: CPT | Performed by: PSYCHIATRY & NEUROLOGY

## 2020-01-01 PROCEDURE — 77030013708 HC HNDPC SUC IRR PULS STRY –B: Performed by: ORTHOPAEDIC SURGERY

## 2020-01-01 PROCEDURE — 74011000250 HC RX REV CODE- 250

## 2020-01-01 PROCEDURE — 85610 PROTHROMBIN TIME: CPT

## 2020-01-01 PROCEDURE — 77030019908 HC STETH ESOPH SIMS -A: Performed by: ANESTHESIOLOGY

## 2020-01-01 PROCEDURE — 36592 COLLECT BLOOD FROM PICC: CPT

## 2020-01-01 PROCEDURE — 83036 HEMOGLOBIN GLYCOSYLATED A1C: CPT

## 2020-01-01 PROCEDURE — 81001 URINALYSIS AUTO W/SCOPE: CPT

## 2020-01-01 PROCEDURE — 77030014405 HC GD ROD RMR SYNT -C: Performed by: ORTHOPAEDIC SURGERY

## 2020-01-01 PROCEDURE — 96375 TX/PRO/DX INJ NEW DRUG ADDON: CPT

## 2020-01-01 PROCEDURE — 96372 THER/PROPH/DIAG INJ SC/IM: CPT

## 2020-01-01 PROCEDURE — 92611 MOTION FLUOROSCOPY/SWALLOW: CPT

## 2020-01-01 PROCEDURE — 94003 VENT MGMT INPAT SUBQ DAY: CPT

## 2020-01-01 PROCEDURE — 65270000029 HC RM PRIVATE

## 2020-01-01 PROCEDURE — 84300 ASSAY OF URINE SODIUM: CPT

## 2020-01-01 PROCEDURE — 77030002933 HC SUT MCRYL J&J -A: Performed by: ORTHOPAEDIC SURGERY

## 2020-01-01 PROCEDURE — 76010000162 HC OR TIME 1.5 TO 2 HR INTENSV-TIER 1: Performed by: ORTHOPAEDIC SURGERY

## 2020-01-01 PROCEDURE — 84132 ASSAY OF SERUM POTASSIUM: CPT

## 2020-01-01 PROCEDURE — 83605 ASSAY OF LACTIC ACID: CPT

## 2020-01-01 PROCEDURE — 99284 EMERGENCY DEPT VISIT MOD MDM: CPT

## 2020-01-01 DEVICE — IMPLANTABLE DEVICE: Type: IMPLANTABLE DEVICE | Site: FEMUR | Status: FUNCTIONAL

## 2020-01-01 DEVICE — SCREW BNE L52MM DIA5MM NONSTERILE TIB LT GRN TI ST CANN LOK: Type: IMPLANTABLE DEVICE | Site: FEMUR | Status: FUNCTIONAL

## 2020-01-01 DEVICE — SCREW BNE L110MM DIA10.35MM G TI CANN PERF FOR PROX FEM: Type: IMPLANTABLE DEVICE | Site: FEMUR | Status: FUNCTIONAL

## 2020-01-01 RX ORDER — SODIUM CHLORIDE 0.9 % (FLUSH) 0.9 %
5-40 SYRINGE (ML) INJECTION EVERY 8 HOURS
Status: DISCONTINUED | OUTPATIENT
Start: 2020-01-01 | End: 2020-01-01

## 2020-01-01 RX ORDER — HYDROMORPHONE HYDROCHLORIDE 2 MG/ML
0.5 INJECTION, SOLUTION INTRAMUSCULAR; INTRAVENOUS; SUBCUTANEOUS
Status: DISCONTINUED | OUTPATIENT
Start: 2020-01-01 | End: 2020-01-01 | Stop reason: HOSPADM

## 2020-01-01 RX ORDER — OXYCODONE HYDROCHLORIDE 5 MG/1
10 TABLET ORAL
Status: DISCONTINUED | OUTPATIENT
Start: 2020-01-01 | End: 2020-01-01

## 2020-01-01 RX ORDER — SODIUM CHLORIDE 0.9 % (FLUSH) 0.9 %
10 SYRINGE (ML) INJECTION
Status: COMPLETED | OUTPATIENT
Start: 2020-01-01 | End: 2020-01-01

## 2020-01-01 RX ORDER — LIDOCAINE HYDROCHLORIDE 10 MG/ML
0.3 INJECTION INFILTRATION; PERINEURAL ONCE
Status: ACTIVE | OUTPATIENT
Start: 2020-01-01 | End: 2020-01-01

## 2020-01-01 RX ORDER — SODIUM CHLORIDE 0.9 % (FLUSH) 0.9 %
5-40 SYRINGE (ML) INJECTION AS NEEDED
Status: DISCONTINUED | OUTPATIENT
Start: 2020-01-01 | End: 2020-01-01 | Stop reason: HOSPADM

## 2020-01-01 RX ORDER — FERROUS SULFATE, DRIED 160(50) MG
1 TABLET, EXTENDED RELEASE ORAL
Status: DISCONTINUED | OUTPATIENT
Start: 2020-01-01 | End: 2020-01-01

## 2020-01-01 RX ORDER — SODIUM CHLORIDE, SODIUM LACTATE, POTASSIUM CHLORIDE, CALCIUM CHLORIDE 600; 310; 30; 20 MG/100ML; MG/100ML; MG/100ML; MG/100ML
125 INJECTION, SOLUTION INTRAVENOUS CONTINUOUS
Status: DISCONTINUED | OUTPATIENT
Start: 2020-01-01 | End: 2020-01-01

## 2020-01-01 RX ORDER — SODIUM BICARBONATE 84 MG/ML
INJECTION, SOLUTION INTRAVENOUS
Status: COMPLETED
Start: 2020-01-01 | End: 2020-01-01

## 2020-01-01 RX ORDER — ESCITALOPRAM OXALATE 10 MG/1
20 TABLET ORAL DAILY
Status: DISCONTINUED | OUTPATIENT
Start: 2020-01-01 | End: 2020-01-01 | Stop reason: HOSPADM

## 2020-01-01 RX ORDER — NEOSTIGMINE METHYLSULFATE 1 MG/ML
INJECTION, SOLUTION INTRAVENOUS AS NEEDED
Status: DISCONTINUED | OUTPATIENT
Start: 2020-01-01 | End: 2020-01-01 | Stop reason: HOSPADM

## 2020-01-01 RX ORDER — SODIUM CHLORIDE 9 MG/ML
250 INJECTION, SOLUTION INTRAVENOUS AS NEEDED
Status: DISCONTINUED | OUTPATIENT
Start: 2020-01-01 | End: 2020-01-01

## 2020-01-01 RX ORDER — OXYCODONE HYDROCHLORIDE 5 MG/1
10 TABLET ORAL
Status: DISCONTINUED | OUTPATIENT
Start: 2020-01-01 | End: 2020-01-01 | Stop reason: HOSPADM

## 2020-01-01 RX ORDER — ALBUMIN HUMAN 250 G/1000ML
12.5 SOLUTION INTRAVENOUS ONCE
Status: COMPLETED | OUTPATIENT
Start: 2020-01-01 | End: 2020-01-01

## 2020-01-01 RX ORDER — FENTANYL CITRATE-0.9 % NACL/PF 25 MCG/ML
0-200 PLASTIC BAG, INJECTION (ML) INJECTION
Status: DISCONTINUED | OUTPATIENT
Start: 2020-01-01 | End: 2020-01-01 | Stop reason: SDUPTHER

## 2020-01-01 RX ORDER — CEFAZOLIN SODIUM/WATER 2 G/20 ML
2 SYRINGE (ML) INTRAVENOUS ONCE
Status: COMPLETED | OUTPATIENT
Start: 2020-01-01 | End: 2020-01-01

## 2020-01-01 RX ORDER — SODIUM CHLORIDE 0.9 % (FLUSH) 0.9 %
5-40 SYRINGE (ML) INJECTION EVERY 8 HOURS
Status: DISCONTINUED | OUTPATIENT
Start: 2020-01-01 | End: 2020-01-01 | Stop reason: HOSPADM

## 2020-01-01 RX ORDER — ONDANSETRON 2 MG/ML
4 INJECTION INTRAMUSCULAR; INTRAVENOUS
Status: DISCONTINUED | OUTPATIENT
Start: 2020-01-01 | End: 2020-01-01 | Stop reason: SDUPTHER

## 2020-01-01 RX ORDER — SODIUM CHLORIDE, SODIUM LACTATE, POTASSIUM CHLORIDE, CALCIUM CHLORIDE 600; 310; 30; 20 MG/100ML; MG/100ML; MG/100ML; MG/100ML
100 INJECTION, SOLUTION INTRAVENOUS CONTINUOUS
Status: DISCONTINUED | OUTPATIENT
Start: 2020-01-01 | End: 2020-01-01

## 2020-01-01 RX ORDER — SODIUM CHLORIDE 9 MG/ML
150 INJECTION, SOLUTION INTRAVENOUS CONTINUOUS
Status: DISCONTINUED | OUTPATIENT
Start: 2020-01-01 | End: 2020-01-01

## 2020-01-01 RX ORDER — MORPHINE SULFATE 2 MG/ML
2 INJECTION, SOLUTION INTRAMUSCULAR; INTRAVENOUS
Status: DISCONTINUED | OUTPATIENT
Start: 2020-01-01 | End: 2020-01-01 | Stop reason: HOSPADM

## 2020-01-01 RX ORDER — FENTANYL CITRATE 50 UG/ML
INJECTION, SOLUTION INTRAMUSCULAR; INTRAVENOUS AS NEEDED
Status: DISCONTINUED | OUTPATIENT
Start: 2020-01-01 | End: 2020-01-01 | Stop reason: HOSPADM

## 2020-01-01 RX ORDER — SODIUM BICARBONATE 84 MG/ML
50 INJECTION, SOLUTION INTRAVENOUS ONCE
Status: COMPLETED | OUTPATIENT
Start: 2020-01-01 | End: 2020-01-01

## 2020-01-01 RX ORDER — FENTANYL CITRATE-0.9 % NACL/PF 25 MCG/ML
0-200 PLASTIC BAG, INJECTION (ML) INJECTION
Status: DISCONTINUED | OUTPATIENT
Start: 2020-01-01 | End: 2020-01-01

## 2020-01-01 RX ORDER — SODIUM BICARBONATE 1 MEQ/ML
50 SYRINGE (ML) INTRAVENOUS ONCE
Status: DISCONTINUED | OUTPATIENT
Start: 2020-01-01 | End: 2020-01-01

## 2020-01-01 RX ORDER — METOPROLOL TARTRATE 5 MG/5ML
1.25 INJECTION INTRAVENOUS
Status: DISCONTINUED | OUTPATIENT
Start: 2020-01-01 | End: 2020-01-01 | Stop reason: HOSPADM

## 2020-01-01 RX ORDER — DEXTROSE 50 % IN WATER (D50W) INTRAVENOUS SYRINGE
25 ONCE
Status: ACTIVE | OUTPATIENT
Start: 2020-01-01 | End: 2020-01-01

## 2020-01-01 RX ORDER — ATORVASTATIN CALCIUM 40 MG/1
40 TABLET, FILM COATED ORAL DAILY
Status: DISCONTINUED | OUTPATIENT
Start: 2020-01-01 | End: 2020-01-01

## 2020-01-01 RX ORDER — LIDOCAINE HYDROCHLORIDE 20 MG/ML
INJECTION, SOLUTION EPIDURAL; INFILTRATION; INTRACAUDAL; PERINEURAL AS NEEDED
Status: DISCONTINUED | OUTPATIENT
Start: 2020-01-01 | End: 2020-01-01 | Stop reason: HOSPADM

## 2020-01-01 RX ORDER — ESCITALOPRAM OXALATE 10 MG/1
20 TABLET ORAL DAILY
Status: DISCONTINUED | OUTPATIENT
Start: 2020-01-01 | End: 2020-01-01

## 2020-01-01 RX ORDER — MIDAZOLAM IN 0.9 % SOD.CHLORID 1 MG/ML
0-10 PLASTIC BAG, INJECTION (ML) INTRAVENOUS
Status: DISCONTINUED | OUTPATIENT
Start: 2020-01-01 | End: 2020-01-01

## 2020-01-01 RX ORDER — ATORVASTATIN CALCIUM 40 MG/1
80 TABLET, FILM COATED ORAL DAILY
Status: DISCONTINUED | OUTPATIENT
Start: 2020-01-01 | End: 2020-01-01 | Stop reason: HOSPADM

## 2020-01-01 RX ORDER — MIDAZOLAM HYDROCHLORIDE 1 MG/ML
2 INJECTION, SOLUTION INTRAMUSCULAR; INTRAVENOUS ONCE
Status: COMPLETED | OUTPATIENT
Start: 2020-01-01 | End: 2020-01-01

## 2020-01-01 RX ORDER — SODIUM POLYSTYRENE SULFONATE 15 G/60ML
30 SUSPENSION ORAL; RECTAL
Status: COMPLETED | OUTPATIENT
Start: 2020-01-01 | End: 2020-01-01

## 2020-01-01 RX ORDER — ONDANSETRON 2 MG/ML
4 INJECTION INTRAMUSCULAR; INTRAVENOUS
Status: COMPLETED | OUTPATIENT
Start: 2020-01-01 | End: 2020-01-01

## 2020-01-01 RX ORDER — ONDANSETRON 2 MG/ML
4 INJECTION INTRAMUSCULAR; INTRAVENOUS
Status: DISCONTINUED | OUTPATIENT
Start: 2020-01-01 | End: 2020-01-01 | Stop reason: HOSPADM

## 2020-01-01 RX ORDER — DEXTROSE MONOHYDRATE AND SODIUM CHLORIDE 5; .9 G/100ML; G/100ML
75 INJECTION, SOLUTION INTRAVENOUS CONTINUOUS
Status: DISCONTINUED | OUTPATIENT
Start: 2020-01-01 | End: 2020-01-01 | Stop reason: HOSPADM

## 2020-01-01 RX ORDER — MAG HYDROX/ALUMINUM HYD/SIMETH 200-200-20
30 SUSPENSION, ORAL (FINAL DOSE FORM) ORAL
Status: DISCONTINUED | OUTPATIENT
Start: 2020-01-01 | End: 2020-01-01

## 2020-01-01 RX ORDER — GUAIFENESIN 100 MG/5ML
81 LIQUID (ML) ORAL DAILY
Status: DISCONTINUED | OUTPATIENT
Start: 2020-01-01 | End: 2020-01-01

## 2020-01-01 RX ORDER — AMLODIPINE BESYLATE 5 MG/1
5 TABLET ORAL DAILY
Status: DISCONTINUED | OUTPATIENT
Start: 2020-01-01 | End: 2020-01-01 | Stop reason: HOSPADM

## 2020-01-01 RX ORDER — ROCURONIUM BROMIDE 10 MG/ML
INJECTION, SOLUTION INTRAVENOUS AS NEEDED
Status: DISCONTINUED | OUTPATIENT
Start: 2020-01-01 | End: 2020-01-01 | Stop reason: HOSPADM

## 2020-01-01 RX ORDER — SODIUM BICARBONATE 84 MG/ML
INJECTION, SOLUTION INTRAVENOUS
Status: DISCONTINUED
Start: 2020-01-01 | End: 2020-01-01 | Stop reason: HOSPADM

## 2020-01-01 RX ORDER — SODIUM BICARBONATE 84 MG/ML
INJECTION, SOLUTION INTRAVENOUS
Status: ACTIVE
Start: 2020-01-01 | End: 2020-01-01

## 2020-01-01 RX ORDER — DEXTROSE MONOHYDRATE AND SODIUM CHLORIDE 5; .45 G/100ML; G/100ML
1000 INJECTION, SOLUTION INTRAVENOUS
Status: COMPLETED | OUTPATIENT
Start: 2020-01-01 | End: 2020-01-01

## 2020-01-01 RX ORDER — ACETAMINOPHEN 500 MG
1000 TABLET ORAL 3 TIMES DAILY
Status: DISCONTINUED | OUTPATIENT
Start: 2020-01-01 | End: 2020-01-01

## 2020-01-01 RX ORDER — ONDANSETRON 2 MG/ML
INJECTION INTRAMUSCULAR; INTRAVENOUS AS NEEDED
Status: DISCONTINUED | OUTPATIENT
Start: 2020-01-01 | End: 2020-01-01 | Stop reason: HOSPADM

## 2020-01-01 RX ORDER — HYDROMORPHONE HYDROCHLORIDE 1 MG/ML
0.5 INJECTION, SOLUTION INTRAMUSCULAR; INTRAVENOUS; SUBCUTANEOUS
Status: DISCONTINUED | OUTPATIENT
Start: 2020-01-01 | End: 2020-01-01

## 2020-01-01 RX ORDER — ALBUTEROL SULFATE 0.83 MG/ML
2.5 SOLUTION RESPIRATORY (INHALATION)
Status: DISCONTINUED | OUTPATIENT
Start: 2020-01-01 | End: 2020-01-01

## 2020-01-01 RX ORDER — HYDROMORPHONE HYDROCHLORIDE 1 MG/ML
0.5 INJECTION, SOLUTION INTRAMUSCULAR; INTRAVENOUS; SUBCUTANEOUS
Status: COMPLETED | OUTPATIENT
Start: 2020-01-01 | End: 2020-01-01

## 2020-01-01 RX ORDER — AMLODIPINE BESYLATE 5 MG/1
5 TABLET ORAL DAILY
Status: DISCONTINUED | OUTPATIENT
Start: 2020-01-01 | End: 2020-01-01

## 2020-01-01 RX ORDER — GLYCOPYRROLATE 0.2 MG/ML
INJECTION INTRAMUSCULAR; INTRAVENOUS AS NEEDED
Status: DISCONTINUED | OUTPATIENT
Start: 2020-01-01 | End: 2020-01-01 | Stop reason: HOSPADM

## 2020-01-01 RX ORDER — NOREPINEPHRINE BITARTRATE/D5W 4MG/250ML
.5-3 PLASTIC BAG, INJECTION (ML) INTRAVENOUS
Status: DISCONTINUED | OUTPATIENT
Start: 2020-01-01 | End: 2020-01-01

## 2020-01-01 RX ORDER — PROPOFOL 10 MG/ML
0-50 VIAL (ML) INTRAVENOUS
Status: DISCONTINUED | OUTPATIENT
Start: 2020-01-01 | End: 2020-01-01

## 2020-01-01 RX ORDER — VANCOMYCIN HYDROCHLORIDE
1250 ONCE
Status: COMPLETED | OUTPATIENT
Start: 2020-01-01 | End: 2020-01-01

## 2020-01-01 RX ORDER — OXYCODONE HYDROCHLORIDE 5 MG/1
5 TABLET ORAL
Status: DISCONTINUED | OUTPATIENT
Start: 2020-01-01 | End: 2020-01-01

## 2020-01-01 RX ORDER — ACETAMINOPHEN 325 MG/1
650 TABLET ORAL
Status: DISCONTINUED | OUTPATIENT
Start: 2020-01-01 | End: 2020-01-01 | Stop reason: HOSPADM

## 2020-01-01 RX ORDER — ENOXAPARIN SODIUM 100 MG/ML
40 INJECTION SUBCUTANEOUS ONCE
Status: COMPLETED | OUTPATIENT
Start: 2020-01-01 | End: 2020-01-01

## 2020-01-01 RX ORDER — LISINOPRIL 20 MG/1
40 TABLET ORAL DAILY
Status: DISCONTINUED | OUTPATIENT
Start: 2020-01-01 | End: 2020-01-01 | Stop reason: HOSPADM

## 2020-01-01 RX ORDER — GUAIFENESIN 100 MG/5ML
81 LIQUID (ML) ORAL DAILY
Status: DISCONTINUED | OUTPATIENT
Start: 2020-01-01 | End: 2020-01-01 | Stop reason: HOSPADM

## 2020-01-01 RX ORDER — LORAZEPAM 2 MG/ML
1 INJECTION INTRAMUSCULAR
Status: DISCONTINUED | OUTPATIENT
Start: 2020-01-01 | End: 2020-01-01 | Stop reason: HOSPADM

## 2020-01-01 RX ORDER — PROPOFOL 10 MG/ML
INJECTION, EMULSION INTRAVENOUS AS NEEDED
Status: DISCONTINUED | OUTPATIENT
Start: 2020-01-01 | End: 2020-01-01 | Stop reason: HOSPADM

## 2020-01-01 RX ADMIN — Medication 10 ML: at 05:58

## 2020-01-01 RX ADMIN — PHENYLEPHRINE HYDROCHLORIDE 200 MCG: 10 INJECTION INTRAVENOUS at 15:16

## 2020-01-01 RX ADMIN — LISINOPRIL 40 MG: 20 TABLET ORAL at 09:04

## 2020-01-01 RX ADMIN — APIXABAN 2.5 MG: 2.5 TABLET, FILM COATED ORAL at 08:30

## 2020-01-01 RX ADMIN — TUBERCULIN PURIFIED PROTEIN DERIVATIVE 5 UNITS: 5 INJECTION, SOLUTION INTRADERMAL at 22:58

## 2020-01-01 RX ADMIN — GLYCOPYRROLATE 0.8 MG: 0.2 INJECTION, SOLUTION INTRAMUSCULAR; INTRAVENOUS at 16:09

## 2020-01-01 RX ADMIN — ESCITALOPRAM OXALATE 20 MG: 10 TABLET ORAL at 09:04

## 2020-01-01 RX ADMIN — ESCITALOPRAM OXALATE 20 MG: 10 TABLET ORAL at 10:13

## 2020-01-01 RX ADMIN — Medication 10 ML: at 05:08

## 2020-01-01 RX ADMIN — AMLODIPINE BESYLATE 5 MG: 5 TABLET ORAL at 08:57

## 2020-01-01 RX ADMIN — Medication: at 23:14

## 2020-01-01 RX ADMIN — HYDROMORPHONE HYDROCHLORIDE 0.5 MG: 1 INJECTION, SOLUTION INTRAMUSCULAR; INTRAVENOUS; SUBCUTANEOUS at 17:07

## 2020-01-01 RX ADMIN — ESCITALOPRAM OXALATE 20 MG: 10 TABLET ORAL at 08:30

## 2020-01-01 RX ADMIN — ACETAMINOPHEN 1000 MG: 500 TABLET, FILM COATED ORAL at 10:00

## 2020-01-01 RX ADMIN — PHENYLEPHRINE HYDROCHLORIDE 200 MCG: 10 INJECTION INTRAVENOUS at 15:55

## 2020-01-01 RX ADMIN — PHENYLEPHRINE HYDROCHLORIDE 400 MCG: 10 INJECTION INTRAVENOUS at 15:31

## 2020-01-01 RX ADMIN — PHENYLEPHRINE HYDROCHLORIDE 200 MCG: 10 INJECTION INTRAVENOUS at 16:00

## 2020-01-01 RX ADMIN — APIXABAN 2.5 MG: 2.5 TABLET, FILM COATED ORAL at 08:57

## 2020-01-01 RX ADMIN — ALBUTEROL SULFATE 2.5 MG: 2.5 SOLUTION RESPIRATORY (INHALATION) at 07:33

## 2020-01-01 RX ADMIN — Medication 5 ML: at 05:30

## 2020-01-01 RX ADMIN — BARIUM SULFATE 15 ML: 400 PASTE ORAL at 10:29

## 2020-01-01 RX ADMIN — PHENYLEPHRINE HYDROCHLORIDE 400 MCG: 10 INJECTION INTRAVENOUS at 15:41

## 2020-01-01 RX ADMIN — LISINOPRIL 40 MG: 20 TABLET ORAL at 08:30

## 2020-01-01 RX ADMIN — Medication 10 ML: at 16:14

## 2020-01-01 RX ADMIN — SODIUM CHLORIDE 100 ML: 900 INJECTION, SOLUTION INTRAVENOUS at 21:15

## 2020-01-01 RX ADMIN — ACETAMINOPHEN 1000 MG: 500 TABLET, FILM COATED ORAL at 21:58

## 2020-01-01 RX ADMIN — DEXTROSE MONOHYDRATE AND SODIUM CHLORIDE 75 ML/HR: 5; .9 INJECTION, SOLUTION INTRAVENOUS at 21:22

## 2020-01-01 RX ADMIN — Medication: at 11:32

## 2020-01-01 RX ADMIN — DEXTROSE MONOHYDRATE AND SODIUM CHLORIDE 1000 ML/HR: 5; .45 INJECTION, SOLUTION INTRAVENOUS at 09:51

## 2020-01-01 RX ADMIN — Medication 10 ML: at 21:14

## 2020-01-01 RX ADMIN — FENTANYL CITRATE 100 MCG: 50 INJECTION INTRAMUSCULAR; INTRAVENOUS at 15:16

## 2020-01-01 RX ADMIN — LIDOCAINE HYDROCHLORIDE 100 MG: 20 INJECTION, SOLUTION EPIDURAL; INFILTRATION; INTRACAUDAL; PERINEURAL at 15:16

## 2020-01-01 RX ADMIN — SODIUM CHLORIDE, SODIUM LACTATE, POTASSIUM CHLORIDE, AND CALCIUM CHLORIDE: 600; 310; 30; 20 INJECTION, SOLUTION INTRAVENOUS at 15:12

## 2020-01-01 RX ADMIN — PHENYLEPHRINE HYDROCHLORIDE 200 MCG: 10 INJECTION INTRAVENOUS at 16:25

## 2020-01-01 RX ADMIN — DEXTROSE MONOHYDRATE AND SODIUM CHLORIDE 75 ML/HR: 5; .9 INJECTION, SOLUTION INTRAVENOUS at 09:00

## 2020-01-01 RX ADMIN — Medication 10 ML: at 22:51

## 2020-01-01 RX ADMIN — PHENYLEPHRINE HYDROCHLORIDE 200 MCG: 10 INJECTION INTRAVENOUS at 16:36

## 2020-01-01 RX ADMIN — PHENYLEPHRINE HYDROCHLORIDE 200 MCG: 10 INJECTION INTRAVENOUS at 16:32

## 2020-01-01 RX ADMIN — ASPIRIN 81 MG 81 MG: 81 TABLET ORAL at 10:00

## 2020-01-01 RX ADMIN — PHENYLEPHRINE HYDROCHLORIDE 300 MCG: 10 INJECTION INTRAVENOUS at 16:34

## 2020-01-01 RX ADMIN — ASPIRIN 81 MG 81 MG: 81 TABLET ORAL at 08:30

## 2020-01-01 RX ADMIN — PHENYLEPHRINE HYDROCHLORIDE 200 MCG: 10 INJECTION INTRAVENOUS at 16:16

## 2020-01-01 RX ADMIN — Medication 10 ML: at 07:45

## 2020-01-01 RX ADMIN — ONDANSETRON 4 MG: 2 INJECTION INTRAMUSCULAR; INTRAVENOUS at 17:07

## 2020-01-01 RX ADMIN — Medication 5 ML: at 21:24

## 2020-01-01 RX ADMIN — VECURONIUM BROMIDE 0.8 MCG/KG/MIN: 10 INJECTION, POWDER, LYOPHILIZED, FOR SOLUTION INTRAVENOUS at 10:06

## 2020-01-01 RX ADMIN — Medication 10 ML: at 08:26

## 2020-01-01 RX ADMIN — SODIUM BICARBONATE 50 MEQ: 84 INJECTION, SOLUTION INTRAVENOUS at 03:53

## 2020-01-01 RX ADMIN — Medication 10 ML: at 14:00

## 2020-01-01 RX ADMIN — IOPAMIDOL 50 ML: 755 INJECTION, SOLUTION INTRAVENOUS at 21:14

## 2020-01-01 RX ADMIN — ENOXAPARIN SODIUM 40 MG: 40 INJECTION SUBCUTANEOUS at 22:15

## 2020-01-01 RX ADMIN — PHENYLEPHRINE HYDROCHLORIDE 200 MCG: 10 INJECTION INTRAVENOUS at 16:29

## 2020-01-01 RX ADMIN — SODIUM CHLORIDE, SODIUM LACTATE, POTASSIUM CHLORIDE, AND CALCIUM CHLORIDE 125 ML/HR: 600; 310; 30; 20 INJECTION, SOLUTION INTRAVENOUS at 16:29

## 2020-01-01 RX ADMIN — SODIUM CHLORIDE 1000 MG: 900 INJECTION, SOLUTION INTRAVENOUS at 08:40

## 2020-01-01 RX ADMIN — AMLODIPINE BESYLATE 5 MG: 5 TABLET ORAL at 10:00

## 2020-01-01 RX ADMIN — BARIUM SULFATE 15 ML: 400 SUSPENSION ORAL at 10:29

## 2020-01-01 RX ADMIN — ATORVASTATIN CALCIUM 80 MG: 40 TABLET, FILM COATED ORAL at 22:47

## 2020-01-01 RX ADMIN — Medication 10 ML: at 21:58

## 2020-01-01 RX ADMIN — ASPIRIN 81 MG 81 MG: 81 TABLET ORAL at 10:14

## 2020-01-01 RX ADMIN — Medication 5 ML: at 15:33

## 2020-01-01 RX ADMIN — SODIUM BICARBONATE 50 MEQ: 84 INJECTION, SOLUTION INTRAVENOUS at 08:00

## 2020-01-01 RX ADMIN — ALBUMIN (HUMAN) 12.5 G: 0.25 INJECTION, SOLUTION INTRAVENOUS at 05:15

## 2020-01-01 RX ADMIN — BARIUM SULFATE 30 ML: 980 POWDER, FOR SUSPENSION ORAL at 10:28

## 2020-01-01 RX ADMIN — Medication 10 ML: at 22:59

## 2020-01-01 RX ADMIN — PROPOFOL INJECTABLE EMULSION 5 MCG/KG/MIN: 10 INJECTION, EMULSION INTRAVENOUS at 18:19

## 2020-01-01 RX ADMIN — MIDAZOLAM HYDROCHLORIDE 2 MG: 2 INJECTION, SOLUTION INTRAMUSCULAR; INTRAVENOUS at 09:45

## 2020-01-01 RX ADMIN — AMLODIPINE BESYLATE 5 MG: 5 TABLET ORAL at 08:30

## 2020-01-01 RX ADMIN — MIDAZOLAM HYDROCHLORIDE 2 MG/HR: 5 INJECTION, SOLUTION INTRAMUSCULAR; INTRAVENOUS at 09:24

## 2020-01-01 RX ADMIN — SODIUM BICARBONATE 50 MEQ: 84 INJECTION, SOLUTION INTRAVENOUS at 04:27

## 2020-01-01 RX ADMIN — ALBUTEROL SULFATE 2.5 MG: 2.5 SOLUTION RESPIRATORY (INHALATION) at 21:50

## 2020-01-01 RX ADMIN — PHENYLEPHRINE HYDROCHLORIDE 200 MCG: 10 INJECTION INTRAVENOUS at 16:19

## 2020-01-01 RX ADMIN — ONDANSETRON 4 MG: 2 INJECTION INTRAMUSCULAR; INTRAVENOUS at 16:09

## 2020-01-01 RX ADMIN — APIXABAN 2.5 MG: 2.5 TABLET, FILM COATED ORAL at 17:28

## 2020-01-01 RX ADMIN — ASPIRIN 81 MG 81 MG: 81 TABLET ORAL at 08:57

## 2020-01-01 RX ADMIN — PHENYLEPHRINE HYDROCHLORIDE 200 MCG: 10 INJECTION INTRAVENOUS at 15:50

## 2020-01-01 RX ADMIN — APIXABAN 2.5 MG: 2.5 TABLET, FILM COATED ORAL at 18:27

## 2020-01-01 RX ADMIN — DEXTROSE MONOHYDRATE 26 MCG/MIN: 5 INJECTION, SOLUTION INTRAVENOUS at 07:45

## 2020-01-01 RX ADMIN — ROCURONIUM BROMIDE 50 MG: 10 INJECTION, SOLUTION INTRAVENOUS at 15:16

## 2020-01-01 RX ADMIN — SODIUM CHLORIDE 100 ML/HR: 900 INJECTION, SOLUTION INTRAVENOUS at 19:38

## 2020-01-01 RX ADMIN — AMLODIPINE BESYLATE 5 MG: 5 TABLET ORAL at 09:04

## 2020-01-01 RX ADMIN — Medication 10 ML: at 05:23

## 2020-01-01 RX ADMIN — AMLODIPINE BESYLATE 5 MG: 5 TABLET ORAL at 10:14

## 2020-01-01 RX ADMIN — Medication 10 ML: at 05:17

## 2020-01-01 RX ADMIN — LISINOPRIL 40 MG: 20 TABLET ORAL at 08:57

## 2020-01-01 RX ADMIN — LISINOPRIL 40 MG: 20 TABLET ORAL at 10:13

## 2020-01-01 RX ADMIN — SODIUM POLYSTYRENE SULFONATE 30 G: 15 SUSPENSION ORAL; RECTAL at 23:19

## 2020-01-01 RX ADMIN — ESCITALOPRAM OXALATE 20 MG: 10 TABLET ORAL at 08:57

## 2020-01-01 RX ADMIN — APIXABAN 2.5 MG: 2.5 TABLET, FILM COATED ORAL at 10:14

## 2020-01-01 RX ADMIN — PROPOFOL 100 MG: 10 INJECTION, EMULSION INTRAVENOUS at 15:16

## 2020-01-01 RX ADMIN — SODIUM CHLORIDE, SODIUM LACTATE, POTASSIUM CHLORIDE, AND CALCIUM CHLORIDE: 600; 310; 30; 20 INJECTION, SOLUTION INTRAVENOUS at 16:05

## 2020-01-01 RX ADMIN — PIPERACILLIN AND TAZOBACTAM 3.38 G: 3; .375 INJECTION, POWDER, FOR SOLUTION INTRAVENOUS at 03:17

## 2020-01-01 RX ADMIN — ATORVASTATIN CALCIUM 80 MG: 40 TABLET, FILM COATED ORAL at 22:08

## 2020-01-01 RX ADMIN — Medication 2 G: at 15:25

## 2020-01-01 RX ADMIN — SODIUM BICARBONATE 50 MEQ: 84 INJECTION, SOLUTION INTRAVENOUS at 01:58

## 2020-01-01 RX ADMIN — SODIUM CHLORIDE, SODIUM LACTATE, POTASSIUM CHLORIDE, AND CALCIUM CHLORIDE 100 ML/HR: 600; 310; 30; 20 INJECTION, SOLUTION INTRAVENOUS at 15:07

## 2020-01-01 RX ADMIN — ASPIRIN 81 MG 81 MG: 81 TABLET ORAL at 09:04

## 2020-01-01 RX ADMIN — Medication 5 ML: at 15:22

## 2020-01-01 RX ADMIN — PIPERACILLIN AND TAZOBACTAM 3.38 G: 3; .375 INJECTION, POWDER, FOR SOLUTION INTRAVENOUS at 09:35

## 2020-01-01 RX ADMIN — SODIUM BICARBONATE: 84 INJECTION, SOLUTION INTRAVENOUS at 11:01

## 2020-01-01 RX ADMIN — Medication 10 ML: at 22:08

## 2020-01-01 RX ADMIN — DEXTROSE MONOHYDRATE 26 MCG/MIN: 5 INJECTION, SOLUTION INTRAVENOUS at 04:14

## 2020-01-01 RX ADMIN — DEXTROSE MONOHYDRATE AND SODIUM CHLORIDE 1000 ML/HR: 5; .45 INJECTION, SOLUTION INTRAVENOUS at 11:02

## 2020-01-01 RX ADMIN — ALBUTEROL SULFATE 2.5 MG: 2.5 SOLUTION RESPIRATORY (INHALATION) at 11:51

## 2020-01-01 RX ADMIN — VECURONIUM BROMIDE 10 MG: 1 INJECTION, POWDER, LYOPHILIZED, FOR SOLUTION INTRAVENOUS at 09:58

## 2020-01-01 RX ADMIN — DEXTROSE MONOHYDRATE 30 MCG/MIN: 5 INJECTION, SOLUTION INTRAVENOUS at 01:45

## 2020-01-01 RX ADMIN — Medication 10 ML: at 05:21

## 2020-01-01 RX ADMIN — Medication 50 MCG/HR: at 03:14

## 2020-01-01 RX ADMIN — Medication 10 ML: at 00:05

## 2020-01-01 RX ADMIN — Medication 10 ML: at 22:00

## 2020-01-01 RX ADMIN — ESCITALOPRAM OXALATE 20 MG: 10 TABLET ORAL at 10:00

## 2020-01-01 RX ADMIN — PHENYLEPHRINE HYDROCHLORIDE 200 MCG: 10 INJECTION INTRAVENOUS at 16:38

## 2020-01-01 RX ADMIN — VANCOMYCIN HYDROCHLORIDE 1250 MG: 10 INJECTION, POWDER, LYOPHILIZED, FOR SOLUTION INTRAVENOUS at 03:15

## 2020-01-01 RX ADMIN — APIXABAN 2.5 MG: 2.5 TABLET, FILM COATED ORAL at 09:04

## 2020-01-01 RX ADMIN — ALBUMIN (HUMAN) 12.5 G: 0.25 INJECTION, SOLUTION INTRAVENOUS at 04:42

## 2020-01-01 RX ADMIN — SODIUM BICARBONATE: 84 INJECTION, SOLUTION INTRAVENOUS at 04:32

## 2020-01-01 RX ADMIN — PHENYLEPHRINE HYDROCHLORIDE 200 MCG: 10 INJECTION INTRAVENOUS at 16:04

## 2020-01-01 RX ADMIN — TUBERCULIN PURIFIED PROTEIN DERIVATIVE 5 UNITS: 5 INJECTION, SOLUTION INTRADERMAL at 10:04

## 2020-01-01 RX ADMIN — Medication 5 MG: at 16:09

## 2020-01-01 RX ADMIN — PHENYLEPHRINE HYDROCHLORIDE 100 MCG: 10 INJECTION INTRAVENOUS at 15:45

## 2020-01-31 PROBLEM — G45.9 TIA (TRANSIENT ISCHEMIC ATTACK): Status: ACTIVE | Noted: 2020-01-01

## 2020-01-31 NOTE — H&P
History & Physcial  
NAME:  Faith Escalera  
Age:  80 y.o. 
:   1934 MRN:   [id number] PCP: Hong Baird MD 
Treatment Team: Attending Provider: Kt Sánchez MD; Primary Nurse: Tresa Croona 
HPI:  
Mr. 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 Pt last known normal at 1330 when he laid down to take a nap. approx 1430 pt spouse woke him up and found to have left sided facial droop and aphasia. Initial NIHSS was 5, CT head showed no acute findings, did show remote large right MCA infarct. He was evaluated by Neurology in the ED. He was deemed to not be a candidate for mechanical thrombectomy or TPA. Pt has known ICA stenosis and has seen Vascular and deemed non surgical in the past.  Wife thinks pt speech is more clear however still significantly slurred. Denies new ext weakness, CP, SOB. Is on eliquis, ASA, statin and compliant. Results summary of Diagnostic Studies/Procedures copied from within [facility] EMR: 
  
CT Results  (Last 48 hours) 20 0825  1135 [facility] Final result Impression:  IMPRESSION:  Negative for acute intracranial abnormality. Chronic changes. Narrative:  CT HEAD WITHOUT CONTRAST. INDICATION: Left facial droop. COMPARISON: None. TECHNIQUE:   5 mm axial scans from the skull base to the vertex. Radiation dose  
reduction techniques were used for this study. Our CT scanners use one or more  
of the following:  Automated exposure control, adjustment of the mA and or kV  
according to patient size, iterative reconstruction. FINDINGS:  No acute intraparenchymal hemorrhage or abnormal extra-axial fluid  
collection. The ventricles are normal size. Large area of encephalomalacia from prior right MCA infarct. No midline shift mass effect. Included portion of the  
paranasal sinuses and orbits unremarkable. Complete ROS done and is as stated in HPI or otherwise negative Past Medical History:  
Diagnosis Date  Arrhythmia   
 reason for current procedure  Arthritis   
 osteo  CAD (coronary artery disease) 1999 CABG no stents  Carotid artery stenosis without cerebral infarction 2016  Chronic pain   
 arthritic  Claudication ([address]  Coronary atherosclerosis of native coronary artery 2016  GERD (gastroesophageal reflux disease)   
 controlled with meds  Hyperlipidemia  Hypertension   
 controlled with meds  Kidney stones   
 hx of  
 MI (myocardial infarction) ([address] 1999  PAF (paroxysmal atrial fibrillation) ([address]  Stroke ([address]  Thromboembolus ([address] Past Surgical History:  
Procedure Laterality Date  [address] [place] [address] UNLISTED  ? hernia repair  CABG, ARTERY-VEIN, THREE  1999 CABG no stents  HX APPENDECTOMY  age 15  
 HX HEENT  12's?  
 left cataract  HX PTCA No Known Allergies Social History Tobacco Use  Smoking status: Former Smoker Packs/day: 1.50 Years: 35.00 Pack years: 52.50 Last attempt to quit: 10/1/1986 Years since quittin.3  Smokeless tobacco: Never Used  Tobacco comment: quit  Substance Use Topics  Alcohol use: Yes Alcohol/week: 6.7 standard drinks Types: 7 Glasses of wine, 1 Shots of liquor per week Comment: minimal  
  
Family History Problem Relation Age of Onset  Coronary Artery Disease Other   
     family history Objective: There were no vitals taken for this visit. No data recorded. Physical Exam: 
General:    Alert, cooperative, no distress, appears stated age. Head:   Normocephalic, without obvious abnormality, atraumatic. Nose:  Nares normal. No drainage or sinus tenderness. Lungs:   CTA, resp even and nonlabored Heart:  S1S2 present without murmurs rubs gallops. Irregular. No LE edema. Abdomen:   Soft, non-tender. Not distended. Bowel sounds normal. No masses Extremities: No cyanosis. Significant residual LUE weakness from previous CVA. RUE strength 4/5, LUE strength 1/5. Skin:     Texture, turgor normal. No rashes or lesions. Not Jaundiced. Right great toe with bandage c/d/i Neurologic: Alert and oriented X3. PERRLA. Left facial droop. Slurred speech. Data Review:  
Recent Results (from the past 24 hour(s)) CBC WITH AUTOMATED DIFF Collection Time: 01/31/20  3:44 PM  
Result Value Ref Range WBC 6.7 4.3 - 11.1 K/uL  
 RBC 4.30 4.23 - 5.6 M/uL  
 HGB 13.8 13.6 - 17.2 g/dL HCT 42.4 41.1 - 50.3 % MCV 98.6 (H) 79.6 - 97.8 FL  
 MCH 32.1 26.1 - 32.9 PG  
 MCHC 32.5 31.4 - 35.0 g/dL  
 RDW 13.5 11.9 - 14.6 % PLATELET 594 089 - 583 K/uL MPV 12.5 (H) 9.4 - 12.3 FL ABSOLUTE NRBC 0.00 0.0 - 0.2 K/uL  
 DF AUTOMATED NEUTROPHILS 64 43 - 78 % LYMPHOCYTES 24 13 - 44 % MONOCYTES 9 4.0 - 12.0 % EOSINOPHILS 2 0.5 - 7.8 % BASOPHILS 1 0.0 - 2.0 % IMMATURE GRANULOCYTES 0 0.0 - 5.0 %  
 ABS. NEUTROPHILS 4.2 1.7 - 8.2 K/UL  
 ABS. LYMPHOCYTES 1.6 0.5 - 4.6 K/UL  
 ABS. MONOCYTES 0.6 0.1 - 1.3 K/UL  
 ABS. EOSINOPHILS 0.2 0.0 - 0.8 K/UL  
 ABS. BASOPHILS 0.0 0.0 - 0.2 K/UL  
 ABS. IMM. GRANS. 0.0 0.0 - 0.5 K/UL METABOLIC PANEL, COMPREHENSIVE Collection Time: 01/31/20  3:44 PM  
Result Value Ref Range Sodium 143 136 - 145 mmol/L Potassium 4.7 3.5 - 5.1 mmol/L Chloride 108 (H) 98 - 107 mmol/L  
 CO2 29 21 - 32 mmol/L Anion gap 6 (L) 7 - 16 mmol/L Glucose 150 (H) 65 - 100 mg/dL BUN 40 (H) 8 - 23 MG/DL Creatinine 1.43 0.8 - 1.5 MG/DL  
 GFR est AA >60 >60 ml/min/1.73m2 GFR est non-AA 50 (L) >60 ml/min/1.73m2  Calcium 9.5 8.3 - 10.4 MG/DL  
 Bilirubin, total 0.3 0.2 - 1.1 MG/DL  
 ALT (SGPT) 29 12 - 65 U/L  
 AST (SGOT) 26 15 - 37 U/L Alk. phosphatase 81 50 - 136 U/L Protein, total 6.6 6.3 - 8.2 g/dL Albumin 3.4 3.2 - 4.6 g/dL Globulin 3.2 2.3 - 3.5 g/dL A-G Ratio 1.1 (L) 1.2 - 3.5 PROTHROMBIN TIME + INR Collection Time: 01/31/20  3:44 PM  
Result Value Ref Range Prothrombin time 14.2 11.7 - 14.5 sec INR 1.1 GLUCOSE, POC Collection Time: 01/31/20  3:46 PM  
Result Value Ref Range Glucose (POC) 125 (H) 65 - 100 mg/dL Assessment and Plan: Active Hospital Problems Diagnosis Date Noted  TIA (transient ischemic attack) 01/31/2020 Admit to Obs remote tele TIA r/o CVA Neurology following On eliquis and ASA, statin Swallowing screen prior to oral intake PT/OT, speech therapy MRI brain CTA head/neck, consult Vascular depending on findings Echo Allow permissive /120 X24 hours Check lipid panel, A1C, TSH, cardiac enzymes Neuro checks Q4 hours Afib On eliquis Right toe wound Consult wound care Followed by wound clinic outpatient Notes, labs, VS, diagnostic testing reviewed Case discussed with pt, care team, Dr. Malik Aguilera, wife at bedside Code DNR, discussed with wife and pt Surrogate decision maker:  Wife DVT prophylaxis:  eliquis Estimated length of stay: 24-48 hours Time spent with pt 45 min Jaclyn Resendiz NP

## 2020-01-31 NOTE — CONSULTS
Consult Patient: Zoltan Leach MRN: [id number] YOB: 1934  Age: 80 y.o. Sex: male Subjective:  
  
Zoltan Leach is a 80 y.o. male who is being seen for code S. Left sided facial droop and slurred speech. The patient was last known normal at 1330. He was brought in by EMS, who reported that patient went to take a nap at 1330 and was normal per family. Stated spouse went to wake him up about 1 hour later (1430) and found he had left sided facial droop and slurring of his words. 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 Stated he takes ASA and Eliquis, last dose this AM. The patient presented to [facility] ED. A code S was called at 1528. Neurology at bedside on arrival at [phone]. Initial NIHSS was 5. A CT of the head was obtained and demonstrated remote large RMCA infarct, no acute intracranial abnormalities noted. CTA not obtained due to remote large RMCA infarct on head CT and low NIH score. He is not a candidate for tPA due to anticoagulated with Eliquis and remote large RMCA infarct on CT. He is not candidate for mechanical thrombectomy given remote large RMCA infarct and low NIH score. Past Medical History:  
Diagnosis Date  Arrhythmia   
 reason for current procedure  Arthritis   
 osteo  CAD (coronary artery disease) 12/1999 CABG no stents  Carotid artery stenosis without cerebral infarction 1/18/2016  Chronic pain   
 arthritic  Claudication ([address]  Coronary atherosclerosis of native coronary artery 1/18/2016  GERD (gastroesophageal reflux disease)   
 controlled with meds  Hyperlipidemia  Hypertension   
 controlled with meds  Kidney stones   
 hx of  
 MI (myocardial infarction) ([address] 12/1999  PAF (paroxysmal atrial fibrillation) ([address]  Stroke ([address]  Thromboembolus ([address] Past Surgical History:  
Procedure Laterality Date [address] [place] [address] UNLISTED  2000? hernia repair  CABG, ARTERY-VEIN, THREE  1999 CABG no stents  HX APPENDECTOMY  age 15  
 HX HEENT  12's?  
 left cataract  HX PTCA Family History Problem Relation Age of Onset  Coronary Artery Disease Other   
     family history Social History Tobacco Use  Smoking status: Former Smoker Packs/day: 1.50 Years: 35.00 Pack years: 52.50 Last attempt to quit: 10/1/1986 Years since quittin.3  Smokeless tobacco: Never Used  Tobacco comment: quit  Substance Use Topics  Alcohol use: Yes Alcohol/week: 6.7 standard drinks Types: 7 Glasses of wine, 1 Shots of liquor per week Comment: minimal  
  
Current Outpatient Medications Medication Sig Dispense Refill  amLODIPine (NORVASC) 5 mg tablet Take 1 Tab by mouth daily. 90 Tab 3  
 apixaban (ELIQUIS) 2.5 mg tablet Take 1 Tab by mouth two (2) times a day. 180 Tab 3  
 atorvastatin (LIPITOR) 80 mg tablet Take 0.5 Tabs by mouth daily. 90 Tab 3  furosemide (LASIX) 40 mg tablet Take 1 Tab by mouth as needed (EDEMA). 90 Tab 3  
 lisinopril (PRINIVIL, ZESTRIL) 40 mg tablet Take 1 Tab by mouth daily. 90 Tab 3  
 escitalopram oxalate (LEXAPRO) 10 mg tablet Take 10 mg by mouth daily.  allopurinol (ZYLOPRIM) 100 mg tablet Take 100 mg by mouth daily.  aspirin 81 mg chewable tablet Take 81 mg by mouth daily.  nitroglycerin (NITROSTAT) 0.4 mg SL tablet 1 Tab by SubLINGual route every five (5) minutes as needed for Chest Pain. 2 Bottle 4 No Known Allergies Review of Systems: 
Not obtained due to emergent situation Objective: There were no vitals filed for this visit. Physical Exam: 
General - Well developed, well nourished, in no apparent distress. Pleasant and conversent. HEENT - Normocephalic, atraumatic. Conjunctiva, tympanic membranes, and oropharynx are clear. Neck - Supple without masses, no bruits Cardiovascular -irregular and rhythm. Normal S1, S2 without murmurs, rubs, or gallops. Lungs - Clear to auscultation. Abdomen - Soft, nontender with normal bowel sounds. Extremities - Peripheral pulses intact. No edema and no rashes. NIHSS  
NIHSS Score: 5 
1a-Level of Consciousness 0 
1b-What is Month/Age 1 
1c-Open/Close Eyes&Hand 0 
2 -Best Gaze 0 
3 -Visual Fields 0 
4 -Facial Palsy 1 
5a-Motor-Left Arm 1 
5b-Motor-Right Arm 0 
6a-Motor-Left Leg 0 
6b-Motor-Right Leg 0 
7 -Limb Ataxia 0 
8 -Sensory 0 
9 -Best Language 0 
10-Dysarthria 2 
11-Extinction/Inattention 0 Lab Results Component Value Date/Time Cholesterol, total 156 09/15/2017 04:58 AM  
 HDL Cholesterol 72 (H) 09/15/2017 04:58 AM  
 LDL, calculated 62.8 09/15/2017 04:58 AM  
 VLDL, calculated 21.2 09/15/2017 04:58 AM  
 Triglyceride 106 09/15/2017 04:58 AM  
 CHOL/HDL Ratio 2.2 09/15/2017 04:58 AM  
  
 
Lab Results Component Value Date/Time Hemoglobin A1c 6.4 (H) 09/15/2017 04:58 AM  
  
 
Images personally reviewed by me at 1550. CT Results (most recent): 
Results from [facility] Encounter encounter on 01/31/20 CT CODE NEURO HEAD WO CONTRAST Narrative CT HEAD WITHOUT CONTRAST. INDICATION: Left facial droop. COMPARISON: None. TECHNIQUE:   5 mm axial scans from the skull base to the vertex. Radiation dose 
reduction techniques were used for this study. Our CT scanners use one or more 
of the following:  Automated exposure control, adjustment of the mA and or kV 
according to patient size, iterative reconstruction. FINDINGS:  No acute intraparenchymal hemorrhage or abnormal extra-axial fluid 
collection. The ventricles are normal size. Large area of encephalomalacia from 
prior right MCA infarct. No midline shift mass effect. Included portion of the 
paranasal sinuses and orbits unremarkable. Impression IMPRESSION:  Negative for acute intracranial abnormality. Chronic changes. Results for orders placed or performed during the hospital encounter of 11/25/18 EKG, 12 LEAD, INITIAL Result Value Ref Range Ventricular Rate 68 BPM  
 Atrial Rate 74 BPM  
 QRS Duration 86 ms  
 Q-T Interval 424 ms QTC Calculation (Bezet) 450 ms Calculated R Axis 86 degrees Calculated T Axis 62 degrees Diagnosis Atrial fibrillation with pvcs Septal infarct , age undetermined Abnormal ECG When compared with ECG of 16-DEC-2017 21:38, No significant change was found Confirmed by Jennifer Dumont MD (), ESVIN CHESTER ([id number]) on 11/25/2018 4:31:33 PM 
  
  
 
MRI Results (most recent):  
Results from Hospital Encounter encounter on 09/14/17 MRI BRAIN WO CONT Narrative MRI BRAIN WITHOUT CONTRAST 9/15/2017 HISTORY: Left facial droop with slurred speech and left arm weakness and 
numbness beginning at thickening. MCA infarct on Head CT 
 
TECHNIQUE: Sagittal and axial T1-weighted, axial T2-weighted, axial and coronal 
FLAIR, axial T2-weighted gradient-echo, axial diffusion weighted images with ADC 
maps of the brain. COMPARISON: Head CT 9/14/2017 FINDINGS: There is a large area of restricted diffusion with associated 
hyperintense FLAIR signal involving the right frontal and parietal lobes 
including the right insular cortex. There is no associated hemorrhage. There is 
no hydrocephalus or extra-axial hematoma. On the T2-weighted and FLAIR sequences, there are numerous hyperintense white 
matter lesions. This pattern may be present with chronic small vessel ischemic 
disease. A mucous retention cyst or polyp is present in the right maxillary 
sinus alveolar recess. Impression IMPRESSION: Acute to early subacute large right MCA territory infarct. I discussed these findings with the patient's nurse, Charli Boswell, today at 08:05. Most recent CTA: 
 
 
Most recent MRA: 
Results from [place] encounter on 09/14/17 MRI BRAIN WO CONT Narrative MRI BRAIN WITHOUT CONTRAST 9/15/2017 HISTORY: Left facial droop with slurred speech and left arm weakness and 
numbness beginning at thickening. MCA infarct on Head CT 
 
TECHNIQUE: Sagittal and axial T1-weighted, axial T2-weighted, axial and coronal 
FLAIR, axial T2-weighted gradient-echo, axial diffusion weighted images with ADC 
maps of the brain. COMPARISON: Head CT 9/14/2017 FINDINGS: There is a large area of restricted diffusion with associated 
hyperintense FLAIR signal involving the right frontal and parietal lobes 
including the right insular cortex. There is no associated hemorrhage. There is 
no hydrocephalus or extra-axial hematoma. On the T2-weighted and FLAIR sequences, there are numerous hyperintense white 
matter lesions. This pattern may be present with chronic small vessel ischemic 
disease. A mucous retention cyst or polyp is present in the right maxillary 
sinus alveolar recess. Impression IMPRESSION: Acute to early subacute large right MCA territory infarct. I discussed these findings with the patient's nurse, Michell Vazquez, today at 08:05. Most recent Echo: 
No results found for this visit on 01/31/20. Assessment:  
 
80year old male seen as a code S with slurred speech and left facial droop. Initial NIHSS was 5. A CT of the head was obtained and demonstrated remote large RMCA infarct, no acute intracranial abnormalities noted. CTA not obtained due to low NIH score to remote large RMCA infarct on head CT. He is not a candidate for tPA due to anticoagulated with Eliquis and remote large RMCA infarct on CT. He is not candidate for mechanical thrombectomy given remote large RMCA infarct and low NIH score. Plan:  
 
· Start ASA 81 mg daily · Initiate high intensity statin · Neurochecks Q4H 
· MRI of brain · CTA of head and neck · Bedside swallow test  
· Labs: A1c, FLP, TSH, Cardiac enzymes, BMP, CBC 
 · Telemetry and echocardiogram with bubble study · PT/OT/ST 
· DVT prophylaxis · BP management - allow permissive HTN to 220/120 x24 hours, treat with labetalol 10 mg IV or nicardipine gtt · Smoking cessation if applicable · Diabetes education if applicable · Depression Screening prior to discharge Signed By: Ilana Gonzalez NP   
 January 31, 2020 Transcribed in the presence of Dr. Dagoberto Werner.

## 2020-01-31 NOTE — ED TRIAGE NOTES
Patient arrives via EMS from home. States called for stroke by wife. Hx of stroke. Slept for nap at 1330, woke up at 1430 and noticed slurred speech with left sided facial droop and drooling. Patient takes eliquis and asa.

## 2020-01-31 NOTE — ED PROVIDER NOTES
Per EMS patient laid down for a nap at 1330. Woke at 1430 and had slurred speech with left facial droop. On Eliquis. History of stroke in 2017. The history is provided by the EMS personnel. Dysarthria This is a new problem. The current episode started 1 to 2 hours ago. The problem has not changed since onset. There was left facial focality noted. Primary symptoms include slurred speech and speech difficulty. There has been no fever. Associated medical issues include CVA. Past Medical History:  
Diagnosis Date  Arrhythmia   
 reason for current procedure  Arthritis   
 osteo  CAD (coronary artery disease) 12/1999 CABG no stents  Carotid artery stenosis without cerebral infarction 1/18/2016  Chronic pain   
 arthritic  Claudication ([address]  Coronary atherosclerosis of native coronary artery 1/18/2016  GERD (gastroesophageal reflux disease)   
 controlled with meds  Hyperlipidemia  Hypertension   
 controlled with meds  Kidney stones   
 hx of  
 MI (myocardial infarction) ([address] 12/1999  PAF (paroxysmal atrial fibrillation) ([address]  Stroke ([address]  Thromboembolus ([address] Past Surgical History:  
Procedure Laterality Date [address] [place] [address] UNLISTED  2000? hernia repair  CABG, ARTERY-VEIN, THREE  12/1999 CABG no stents  HX APPENDECTOMY  age 15  
 HX HEENT  12's?  
 left cataract  HX PTCA Family History:  
Problem Relation Age of Onset  Coronary Artery Disease Other   
     family history Social History Socioeconomic History  Marital status:  Spouse name: Not on file  Number of children: Not on file  Years of education: Not on file  Highest education level: Not on file Occupational History  Not on file Social Needs  Financial resource strain: Not on file  Food insecurity:  
  Worry: Not on file Inability: Not on file  Transportation needs:  
  Medical: Not on file Non-medical: Not on file Tobacco Use  Smoking status: Former Smoker Packs/day: 1.50 Years: 35.00 Pack years: 52.50 Last attempt to quit: 10/1/1986 Years since quittin.3  Smokeless tobacco: Never Used  Tobacco comment: quit  Substance and Sexual Activity  Alcohol use: Yes Alcohol/week: 6.7 standard drinks Types: 7 Glasses of wine, 1 Shots of liquor per week Comment: minimal  
 Drug use: No  
  Types: Prescription, OTC  Sexual activity: Not on file Lifestyle  Physical activity:  
  Days per week: Not on file Minutes per session: Not on file  Stress: Not on file Relationships  Social connections:  
  Talks on phone: Not on file Gets together: Not on file Attends Yazidi service: Not on file Active member of club or organization: Not on file Attends meetings of clubs or organizations: Not on file Relationship status: Not on file  Intimate partner violence:  
  Fear of current or ex partner: Not on file Emotionally abused: Not on file Physically abused: Not on file Forced sexual activity: Not on file Other Topics Concern  Not on file Social History Narrative  Not on file ALLERGIES: Patient has no known allergies. Review of Systems Unable to perform ROS: Acuity of condition Neurological: Positive for facial asymmetry, speech difficulty and weakness. There were no vitals filed for this visit. Physical Exam 
Constitutional:   
   General: He is not in acute distress. Appearance: He is well-developed. HENT:  
   Head: Normocephalic and atraumatic. Eyes:  
   Extraocular Movements: Extraocular movements intact. Pupils: Pupils are equal, round, and reactive to light. Neck: Musculoskeletal: Normal range of motion and neck supple. Cardiovascular:  
   Rate and Rhythm: Normal rate and regular rhythm. Pulmonary: Effort: Pulmonary effort is normal.  
   Breath sounds: Normal breath sounds. Abdominal:  
   General: Bowel sounds are normal.  
   Palpations: Abdomen is soft. Tenderness: There is no abdominal tenderness. Musculoskeletal:     
   General: No swelling or tenderness. Skin: 
   General: Skin is warm and dry. Neurological:  
   Mental Status: He is alert. Cranial Nerves: Cranial nerve deficit present. Motor: Weakness present. MDM Number of Diagnoses or Management Options Diagnosis management comments: Neurology evaluated and patient not a TPA candidate with Eliquis on board. Large old stroke not a candidate for mechanical intervention. They request admission to the hospitalist for further work-up and evaluation. Amount and/or Complexity of Data Reviewed Clinical lab tests: ordered and reviewed Tests in the radiology section of CPT®: ordered and reviewed Patient Progress Patient progress: stable Procedures EKG: nonspecific ST and T waves changes, atrial fibrillation, rate 62. CT CODE NEURO HEAD WO CONTRAST (Final result) Result time 01/31/20 15:59:22 Final result by Gudelia Pimentel MD (01/31/20 15:59:22) Impression:  
 IMPRESSION:  Negative for acute intracranial abnormality.  Chronic changes. Narrative:  
 CT HEAD WITHOUT CONTRAST. INDICATION: Left facial droop. COMPARISON: None.   
 
TECHNIQUE:   5 mm axial scans from the skull base to the vertex.  Radiation dose 
reduction techniques were used for this study.  Our CT scanners use one or more 
of the following:  Automated exposure control, adjustment of the mA and or kV 
according to patient size, iterative reconstruction. FINDINGS:  No acute intraparenchymal hemorrhage or abnormal extra-axial fluid 
collection.  The ventricles are normal size. Large area of encephalomalacia from prior right MCA infarct.  No midline shift mass effect.  Included portion of the 
paranasal sinuses and orbits unremarkable.  
  
  
  
   
 
Results Include: 
 
Recent Results (from the past 24 hour(s)) CBC WITH AUTOMATED DIFF Collection Time: 01/31/20  3:44 PM  
Result Value Ref Range WBC 6.7 4.3 - 11.1 K/uL  
 RBC 4.30 4.23 - 5.6 M/uL  
 HGB 13.8 13.6 - 17.2 g/dL HCT 42.4 41.1 - 50.3 % MCV 98.6 (H) 79.6 - 97.8 FL  
 MCH 32.1 26.1 - 32.9 PG  
 MCHC 32.5 31.4 - 35.0 g/dL  
 RDW 13.5 11.9 - 14.6 % PLATELET 010 783 - 357 K/uL MPV 12.5 (H) 9.4 - 12.3 FL ABSOLUTE NRBC 0.00 0.0 - 0.2 K/uL  
 DF AUTOMATED NEUTROPHILS 64 43 - 78 % LYMPHOCYTES 24 13 - 44 % MONOCYTES 9 4.0 - 12.0 % EOSINOPHILS 2 0.5 - 7.8 % BASOPHILS 1 0.0 - 2.0 % IMMATURE GRANULOCYTES 0 0.0 - 5.0 %  
 ABS. NEUTROPHILS 4.2 1.7 - 8.2 K/UL  
 ABS. LYMPHOCYTES 1.6 0.5 - 4.6 K/UL  
 ABS. MONOCYTES 0.6 0.1 - 1.3 K/UL  
 ABS. EOSINOPHILS 0.2 0.0 - 0.8 K/UL  
 ABS. BASOPHILS 0.0 0.0 - 0.2 K/UL  
 ABS. IMM. GRANS. 0.0 0.0 - 0.5 K/UL METABOLIC PANEL, COMPREHENSIVE Collection Time: 01/31/20  3:44 PM  
Result Value Ref Range Sodium 143 136 - 145 mmol/L Potassium 4.7 3.5 - 5.1 mmol/L Chloride 108 (H) 98 - 107 mmol/L  
 CO2 29 21 - 32 mmol/L Anion gap 6 (L) 7 - 16 mmol/L Glucose 150 (H) 65 - 100 mg/dL BUN 40 (H) 8 - 23 MG/DL Creatinine 1.43 0.8 - 1.5 MG/DL  
 GFR est AA >60 >60 ml/min/1.73m2 GFR est non-AA 50 (L) >60 ml/min/1.73m2 Calcium 9.5 8.3 - 10.4 MG/DL Bilirubin, total 0.3 0.2 - 1.1 MG/DL  
 ALT (SGPT) 29 12 - 65 U/L  
 AST (SGOT) 26 15 - 37 U/L Alk. phosphatase 81 50 - 136 U/L Protein, total 6.6 6.3 - 8.2 g/dL Albumin 3.4 3.2 - 4.6 g/dL Globulin 3.2 2.3 - 3.5 g/dL A-G Ratio 1.1 (L) 1.2 - 3.5 GLUCOSE, POC Collection Time: 01/31/20  3:46 PM  
Result Value Ref Range Glucose (POC) 125 (H) 65 - 100 mg/dL

## 2020-02-01 NOTE — PROGRESS NOTES
MRA consent form signed by patient spouse. Patient  Is going to CT, via bed with transport. Family present in room.

## 2020-02-01 NOTE — PROGRESS NOTES
Problem: Mobility Impaired (Adult and Pediatric) Goal: *Acute Goals and Plan of Care (Insert Text) Description LTG: 
(1.)Mr. Christiano Wilson will move from supine to sit and sit to supine , scoot up and down and roll side to side in bed with MINIMAL ASSIST within 7 treatment day(s). (2.)Mr. Christiano Wilson will transfer from bed to chair and chair to bed with MODERATE ASSIST using the least restrictive device within 7 treatment day(s). (3.)Mr. Christiano Wilson will demonstrate fair dynamic sitting balance for 3 minutes within 7 treatment day(s). (4.)Mr. Christiano Wilson will demonstrate fair static standing balance for 3 minutes within 7 treatment days. (5.)Mr. Christiano Wilson will tolerate sitting upright in bedside chair x 3 hours to increase tolerance for physical activity within 7 treatment days. (5.)Mr. Christiano Wilson will perform B LE therapeutic exercises x 20 reps with MINIMAL ASSIST within 7 days to increase strength for improved safety and independence in transfers and gait. 
________________________________________________________________________________________________ Outcome: Progressing Towards Goal 
 
 
PHYSICAL THERAPY: Initial Assessment, Daily Note, and AM 2/1/2020 OBSERVATION: PT Visit Days : 1 Payor: [facility] MEDIC[facility] / Plan[place] [facility] MEDICARE HMO/PPO / Product Type: Managed Care Medicare /   
  
NAME/AGE/GENDER: Marguerite Burleson is a 80 y.o. male PRIMARY DIAGNOSIS: TIA (transient ischemic attack) [G45.9] TIA (transient ischemic attack) TIA (transient ischemic attack) ICD-10: Treatment Diagnosis:  
 · Generalized Muscle Weakness (M62.81) · Other lack of cordination (R27.8) · Difficulty in walking, Not elsewhere classified (R26.2) · History of falling (Z91.81) Precaution/Allergies: 
Patient has no known allergies. ASSESSMENT:  
 
Mr. Christiano Wilson presents supine in bed with wife at bedside. Pt noted to have slurred speech, drooling and facial droop.  Wife reports that pt is functionally more impaired than prior to admission. Pt was ambulating on a household level with close SBA or CGA and a quad cane as needed. Pt is now s/p TIA. Currently, pt is mod assist x 2 for bed mobility and sit to stand transfers. Co-treatment performed with OT secondary to level of impairment of pt and low tolerance for physical activity. PT addressed LE neuro treatment while OT performed UE neuro treatment. Together the disciplines worked on bed mobility, standing and sitting balance while addressing self care as well. Assisted pt in finding midline and maintaining without UE support. Increased tone in B LEs led to LEs often being elevated off floor with pt unable to demonstrate motor control. Proprioceptive activities performed to assist pt in getting weight through LEs. Performed sit to stand x 2 with mod A x 2. Pre-gait activities performed in standing including weight-shifting and balance in midline. Pt is currently functioning far below baseline and would benefit from skilled PT. Highly intensified inpatient therapy is strongly recommended. Pt left in supine with bed alarm on, call bell in reach and wife at bedside (3 rails up). This section established at most recent assessment PROBLEM LIST (Impairments causing functional limitations): 1. Decreased Strength 2. Decreased ADL/Functional Activities 3. Decreased Transfer Abilities 4. Decreased Ambulation Ability/Technique 5. Decreased Balance 6. Decreased Activity Tolerance 7. Decreased [place] with Home Exercise Program 
 INTERVENTIONS PLANNED: (Benefits and precautions of physical therapy have been discussed with the patient.) 1. Balance Exercise 2. Bed Mobility 3. Gait Training 4. Home Exercise Program (HEP) 5. Range of Motion (ROM) 6. Therapeutic Activites 7. Therapeutic Exercise/Strengthening 8. Transfer Training TREATMENT PLAN: Frequency/Duration: 3 times a week for duration of hospital stay Rehabilitation Potential For Stated Goals: Good REHAB RECOMMENDATIONS (at time of discharge pending progress):   
Placement: It is my opinion, based on this patient's performance to date, that Mr. Nicko Caba may benefit from intensive therapy at an [facility] after discharge due to a probable need for multiple therapy disciplines and potential to make ongoing and sustainable functional improvement that is of practical value. [employer] Equipment: ? To be determined HISTORY:  
History of Present Injury/Illness (Reason for Referral): Mr. 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 Pt last known normal at 1330 when he laid down to take a nap. approx 1430 pt spouse woke him up and found to have left sided facial droop and aphasia. Initial NIHSS was 5, CT head showed no acute findings, did show remote large right MCA infarct. He was evaluated by Neurology in the ED. He was deemed to not be a candidate for mechanical thrombectomy or TPA. Pt has known ICA stenosis and has seen Vascular and deemed non surgical in the past.  Wife thinks pt speech is more clear however still significantly slurred. Denies new ext weakness, CP, SOB. Is on eliquis, ASA, statin and compliant. Past Medical History/Comorbidities: Mr. Nicko Caba  has a past medical history of Arrhythmia, Arthritis, CAD (coronary artery disease) (12/1999), Carotid artery stenosis without cerebral infarction (1/18/2016), Chronic pain, Claudication ([address] Coronary atherosclerosis of native coronary artery (1/18/2016), GERD (gastroesophageal reflux disease), Hyperlipidemia, Hypertension, Kidney stones, MI (myocardial infarction) ([address] (12/1999), PAF (paroxysmal atrial fibrillation) ([address] Stroke ([address] and Thromboembolus ([address]   Mr. Nicko Caba has a past surgical history that includes pr abdomen surgery proc unlisted (2000?); hx appendectomy (age 15); hx heent (1's?); hx ptca; and pr cabg, artery-vein, three (12/1999). Social History/Living Environment:  
Home Environment: Private residence One/Two Story Residence: One story Living Alone: No 
Support Systems: Spouse/Significant Other/Partner Patient Expects to be Discharged to[de-identified] Rehabilitation facility Current DME Used/Available at Home: Cane, quad, Grab bars, Shower chair, Tub transfer bench, Wheelchair Tub or Shower Type: Shower Prior Level of Function/Work/Activity: 
Pt lives with wife. Prior to admission, pt was receiving help from personal care assistanct 3 days per week for 2 hours each day. Wife assisted with ADLs. Pt was ambulating short distances within home while furniture walking or using quad cane. One recent fall. Does not drive. Personal Factors:   
      Sex:  male Age:  80 y.o. Number of Personal Factors/Comorbidities that affect the Plan of Care: 1-2: MODERATE COMPLEXITY EXAMINATION:  
Most Recent Physical Functioning:  
Gross Assessment: 
AROM: Generally decreased, functional 
Strength: Generally decreased, functional 
Coordination: Generally decreased, functional 
Tone: Abnormal 
Sensation: Intact Posture: 
  
Balance: 
Sitting: Impaired Sitting - Static: Fair (occasional) Sitting - Dynamic: Poor (constant support) Standing: Impaired Standing - Static: Poor Standing - Dynamic : Poor Bed Mobility: 
Rolling: Moderate assistance Supine to Sit: Moderate assistance;Assist x2 Sit to Supine: Moderate assistance;Assist x2 Scooting: Maximum assistance Interventions: Safety awareness training;Verbal cues Wheelchair Mobility: 
  
Transfers: 
Sit to Stand: Moderate assistance;Assist x2 Stand to Sit: Moderate assistance;Assist x2 Interventions: Safety awareness training;Verbal cues Gait: 
  
   
  
Body Structures Involved: 1. Nerves 2. Muscles Body Functions Affected: 1. Voice and Speech 2. Neuromusculoskeletal 
3. Movement Related Activities and Participation Affected: 1. General Tasks and Demands 2. Communication 3. Mobility 4. Domestic Life Number of elements that affect the Plan of Care: 4+: HIGH COMPLEXITY CLINICAL PRESENTATION:  
Presentation: Stable and uncomplicated: LOW COMPLEXITY CLINICAL DECISION [place] [place] [address] AM-PAC 6 Clicks Basic Mobility Inpatient Short Form How much difficulty does the patient currently have. .. Unable A Lot A Little None 1. Turning over in bed (including adjusting bedclothes, sheets and blankets)? [] 1   [x] 2   [] 3   [] 4  
2. Sitting down on and standing up from a chair with arms ( e.g., wheelchair, bedside commode, etc.)   [] 1   [x] 2   [] 3   [] 4  
3. Moving from lying on back to sitting on the side of the bed? [] 1   [x] 2   [] 3   [] 4 How much help from another person does the patient currently need. .. Total A Lot A Little None 4. Moving to and from a bed to a chair (including a wheelchair)? [x] 1   [] 2   [] 3   [] 4  
5. Need to walk in hospital room? [x] 1   [] 2   [] 3   [] 4  
6. Climbing 3-5 steps with a railing? [x] 1   [] 2   [] 3   [] 4  
© , Trustees of [address] [place] [address], under license to [employer] All rights reserved Score:  Initial: 9 Most Recent: X (Date: -- ) Interpretation of Tool:  Represents activities that are increasingly more difficult (i.e. Bed mobility, Transfers, Gait). Medical Necessity:    
· Patient demonstrates · good ·  rehab potential due to higher previous functional level. Reason for Services/Other Comments: 
· Patient · continues to require present interventions due to patient's inability to function at baseline · . Use of outcome tool(s) and clinical judgement create a POC that gives a: Questionable prediction of patient's progress: MODERATE COMPLEXITY  
  
 
 
 
TREATMENT:  
 (In addition to Assessment/Re-Assessment sessions the following treatments were rendered) Pre-treatment Symptoms/Complaints:  pt with very funny sense of humor, pleasant Pain: Initial:  
Pain Intensity 1: 0  Post Session:  0/10 Today's treatment session addressed Decreased Strength, Decreased ADL/Functional Activities, Decreased Transfer Abilities, Decreased Balance, Decreased Activity Tolerance, and Decreased [place] with Home Exercise Program to progress towards achieving goal(s) 1, 2, 3, 4, and 5. During this session, Occupational Therapy addressed ADLs to progress towards their discipline specific goal(s). Co-treatment was necessary to improve patient's ability to follow higher level commands, ability to increase activity demands, and ability to return to normal functional activity. Gait Training ( 10 min):  Gait training to improve and/or restore physical functioning as related to mobility, strength, balance, coordination, and dynamic movement of all extremities to improve functional mobility . Pre-gait activities performed to prepare for ambulation   with hand held assist   using proprioceptive movements   and moderate verbal and tactile cues related to their balance in midline  to promote improved static / dynamic standing balance . Neuromuscular Re-education: ( 20 min):  Exercise/activities to improve balance, coordination, kinesthetic sense, posture, and proprioception. Required moderate verbal and tactile cues to promote static balance in sitting, promote coordination of bilateral, upper extremity(s), lower extremity(s), and promote motor control of bilateral, upper extremity(s), lower extremity(s). Braces/Orthotics/Lines/Etc:  
· none Treatment/Session Assessment:   
· Response to Treatment:  fatigued quickly in standing · Interdisciplinary Collaboration:  
o Physical Therapist 
o Occupational Therapist 
o Registered Nurse · After treatment position/precautions: o Supine in bed 
o Bed alarm/tab alert on 
o Bed/Chair-wheels locked 
o Bed in low position 
o Call light within reach 
o RN notified 
o Family at bedside 
o Side rails x 3  
· Compliance with Program/Exercises: Will assess as treatment progresses · Recommendations/Intent for next treatment session: \"Next visit will focus on advancements to more challenging activities and reduction in assistance provided\". Total Treatment Duration: PT Patient Time In/Time Out Time In: 3265 Time Out: 6547 Christina Coyle, PT

## 2020-02-01 NOTE — PROGRESS NOTES
02/01/20 1893 NIH Stroke Scale Interval Other (comment) (DUAL NIH  with  Coco Bates RN)  
LOC 0  
LOC Questions 0 LOC Commands 0 Best Gaze 0 Visual 0 Facial Palsy 1 Motor Right Arm 0 Motor Left Arm 1 Motor Right Leg 0 Motor Left Leg 0 Limb Ataxia 0 Sensory 0 Best Language 0 Dysarthria 1 Extinction and Inattention 0 Total 3

## 2020-02-01 NOTE — PROGRESS NOTES
Hospitalist Progress Note Admit Date:  2020  3:39 PM  
Name:  Macey Hensley  
Age:  80 y.o. 
:  1934 MRN:  [id number] PCP:  Jake Hickman MD 
Treatment Team: Attending Provider: Xiomara Villanueva MD; Consulting Provider: Asha Yung MD; Charge Nurse: Maria Victoria Bentley; Physical Therapist: Donna Brunner PT; Nurse Practitioner: Burgess Krista NP; Occupational Therapist: Sathish Watkins OT; Speech Language Pathologist: DIANNE Domínguez Subjective: HPI and or CC: 
 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 2: 
Patient is alert and oriented x3. Slurred speech remains but improving per nursing staff. Spouse at bedside. Update provided. Neuro following, MRI, echo, CTA head/neck official read pending. ST eval pending as well. Objective:  
 
Patient Vitals for the past 24 hrs: 
 Temp Pulse Resp BP SpO2  
20 0800 97.3 °F (36.3 °C) 70 19 135/76 91 % 20 0400 97.6 °F (36.4 °C) 68 20 153/68 94 % 20 0000 98.4 °F (36.9 °C) 62 20 180/80 96 % 20 1932 97.7 °F (36.5 °C) 67 20 191/72 96 % 20 1829  63 23 176/78 93 % 20 1809 98 °F (36.7 °C)      
20 1730  62 20 184/80 96 % Oxygen Therapy O2 Sat (%): 91 % (20 0800) Pulse via Oximetry: 67 beats per minute (20 1829) No intake or output data in the 24 hours ending 20 0851 REVIEW OF SYSTEMS: Comprehensive ROS performed and negative except as stated in HPI. Physical Examination: 
General:       Alert, cooperative, no distress, appears stated age. Head:            Normocephalic, without obvious abnormality, atraumatic. Nose:            Nares normal. No drainage or sinus tenderness. Lungs:          CTA, resp even and nonlabored Heart:            S1S2 present without murmurs rubs gallops. Irregular. No LE edema. Abdomen:    Soft, non-tender. Not distended. Bowel sounds normal. No masses Extremities: No cyanosis. Significant residual LUE weakness from previous CVA. RUE strength 4/5, LUE strength 1/5. Skin:             Texture, turgor normal. No rashes or lesions. Not Jaundiced. Right great toe with bandage c/d/i Neurologic:  Alert and oriented X3. PERRLA. Left facial droop. Slurred speech.   
  
 
Data Review: 
I have reviewed all labs, meds, telemetry events, and studies from the last 24 hours. Recent Results (from the past 24 hour(s)) CBC WITH AUTOMATED DIFF Collection Time: 01/31/20  3:44 PM  
Result Value Ref Range WBC 6.7 4.3 - 11.1 K/uL  
 RBC 4.30 4.23 - 5.6 M/uL  
 HGB 13.8 13.6 - 17.2 g/dL HCT 42.4 41.1 - 50.3 % MCV 98.6 (H) 79.6 - 97.8 FL  
 MCH 32.1 26.1 - 32.9 PG  
 MCHC 32.5 31.4 - 35.0 g/dL  
 RDW 13.5 11.9 - 14.6 % PLATELET 609 322 - 269 K/uL MPV 12.5 (H) 9.4 - 12.3 FL ABSOLUTE NRBC 0.00 0.0 - 0.2 K/uL  
 DF AUTOMATED NEUTROPHILS 64 43 - 78 % LYMPHOCYTES 24 13 - 44 % MONOCYTES 9 4.0 - 12.0 % EOSINOPHILS 2 0.5 - 7.8 % BASOPHILS 1 0.0 - 2.0 % IMMATURE GRANULOCYTES 0 0.0 - 5.0 %  
 ABS. NEUTROPHILS 4.2 1.7 - 8.2 K/UL  
 ABS. LYMPHOCYTES 1.6 0.5 - 4.6 K/UL  
 ABS. MONOCYTES 0.6 0.1 - 1.3 K/UL  
 ABS. EOSINOPHILS 0.2 0.0 - 0.8 K/UL  
 ABS. BASOPHILS 0.0 0.0 - 0.2 K/UL  
 ABS. IMM. GRANS. 0.0 0.0 - 0.5 K/UL METABOLIC PANEL, COMPREHENSIVE Collection Time: 01/31/20  3:44 PM  
Result Value Ref Range Sodium 143 136 - 145 mmol/L Potassium 4.7 3.5 - 5.1 mmol/L Chloride 108 (H) 98 - 107 mmol/L  
 CO2 29 21 - 32 mmol/L Anion gap 6 (L) 7 - 16 mmol/L Glucose 150 (H) 65 - 100 mg/dL BUN 40 (H) 8 - 23 MG/DL Creatinine 1.43 0.8 - 1.5 MG/DL  
 GFR est AA >60 >60 ml/min/1.73m2 GFR est non-AA 50 (L) >60 ml/min/1.73m2  Calcium 9.5 8.3 - 10.4 MG/DL  
 Bilirubin, total 0.3 0.2 - 1.1 MG/DL  
 ALT (SGPT) 29 12 - 65 U/L  
 AST (SGOT) 26 15 - 37 U/L Alk. phosphatase 81 50 - 136 U/L Protein, total 6.6 6.3 - 8.2 g/dL Albumin 3.4 3.2 - 4.6 g/dL Globulin 3.2 2.3 - 3.5 g/dL A-G Ratio 1.1 (L) 1.2 - 3.5 PROTHROMBIN TIME + INR Collection Time: 01/31/20  3:44 PM  
Result Value Ref Range Prothrombin time 14.2 11.7 - 14.5 sec INR 1.1 GLUCOSE, POC Collection Time: 01/31/20  3:46 PM  
Result Value Ref Range Glucose (POC) 125 (H) 65 - 100 mg/dL EKG, 12 LEAD, INITIAL Collection Time: 01/31/20  4:12 PM  
Result Value Ref Range Ventricular Rate 62 BPM  
 Atrial Rate 127 BPM  
 QRS Duration 96 ms  
 Q-T Interval 420 ms QTC Calculation (Bezet) 426 ms Calculated R Axis 99 degrees Calculated T Axis 71 degrees Diagnosis Atrial fibrillation Septal infarct (cited on or before 07-NOV-2015) Abnormal ECG Confirmed by Peter Small ([id number]) on 2/1/2020 7:19:28 AM 
  
LIPID PANEL Collection Time: 02/01/20  5:33 AM  
Result Value Ref Range LIPID PROFILE Cholesterol, total 143 <200 MG/DL Triglyceride 106 35 - 150 MG/DL  
 HDL Cholesterol 53 40 - 60 MG/DL  
 LDL, calculated 68.8 <100 MG/DL VLDL, calculated 21.2 6.0 - 23.0 MG/DL  
 CHOL/HDL Ratio 2.7 HEMOGLOBIN A1C WITH EAG Collection Time: 02/01/20  5:33 AM  
Result Value Ref Range Hemoglobin A1c 6.5 (H) 4.8 - 6.0 % Est. average glucose 140 mg/dL CBC W/O DIFF Collection Time: 02/01/20  5:33 AM  
Result Value Ref Range WBC 7.9 4.3 - 11.1 K/uL  
 RBC 4.08 (L) 4.23 - 5.6 M/uL  
 HGB 13.0 (L) 13.6 - 17.2 g/dL HCT 39.7 (L) 41.1 - 50.3 % MCV 97.3 79.6 - 97.8 FL  
 MCH 31.9 26.1 - 32.9 PG  
 MCHC 32.7 31.4 - 35.0 g/dL  
 RDW 13.5 11.9 - 14.6 % PLATELET 736 471 - 253 K/uL MPV 12.4 (H) 9.4 - 12.3 FL ABSOLUTE NRBC 0.00 0.0 - 0.2 K/uL All Micro Results None Current Meds: Current Facility-Administered Medications Medication Dose Route Frequency  amLODIPine (NORVASC) tablet 5 mg  5 mg Oral DAILY  apixaban (ELIQUIS) tablet 2.5 mg  2.5 mg Oral BID  aspirin chewable tablet 81 mg  81 mg Oral DAILY  atorvastatin (LIPITOR) tablet 40 mg  40 mg Oral DAILY  escitalopram oxalate (LEXAPRO) tablet 20 mg  20 mg Oral DAILY  lisinopril (PRINIVIL, ZESTRIL) tablet 40 mg  40 mg Oral DAILY  sodium chloride (NS) flush 5-40 mL  5-40 mL IntraVENous Q8H  
 sodium chloride (NS) flush 5-40 mL  5-40 mL IntraVENous PRN  
 acetaminophen (TYLENOL) tablet 650 mg  650 mg Oral Q4H PRN  
 metoprolol (LOPRESSOR) injection 1.25 mg  1.25 mg IntraVENous Q4H PRN Diet: DIET NPO Other Studies (last 24 hours): 
Ct Code Neuro Head Wo Contrast 
 
Result Date: 1/31/2020 CT HEAD WITHOUT CONTRAST. INDICATION: Left facial droop. COMPARISON: None. TECHNIQUE:   5 mm axial scans from the skull base to the vertex. Radiation dose reduction techniques were used for this study. Our CT scanners use one or more of the following:  Automated exposure control, adjustment of the mA and or kV according to patient size, iterative reconstruction. FINDINGS:  No acute intraparenchymal hemorrhage or abnormal extra-axial fluid collection. The ventricles are normal size. Large area of encephalomalacia from prior right MCA infarct. No midline shift mass effect. Included portion of the paranasal sinuses and orbits unremarkable. IMPRESSION:  Negative for acute intracranial abnormality. Chronic changes. Assessment and Plan:  
 
Hospital Problems as of 2/1/2020 Never Reviewed Codes Class Noted - Resolved POA * (Principal) TIA (transient ischemic attack) ICD-10-CM: G45.9 ICD-9-CM: 435.9  1/31/2020 - Present Unknown CAD (coronary artery disease) (Chronic) ICD-10-CM: I25.10 ICD-9-CM: 414.00  10/6/2015 - Present Yes  Hypertension (Chronic) ICD-10-CM: I10 
 ICD-9-CM: 401.9  10/6/2015 - Present Yes PAF (paroxysmal atrial fibrillation) (HCC) (Chronic) ICD-10-CM: I48.0 ICD-9-CM: 427.31  10/6/2015 - Present Yes Overview Signed 4/26/2017 12:59 PM by Emilia Gallegos MD  
  cha vasc 6 Dyslipidemia (Chronic) ICD-10-CM: W99.2 ICD-9-CM: 272.4  10/6/2015 - Present Yes A/P:   
TIA r/o CVA Neurology following On eliquis and ASA, statin Swallowing screen prior to oral intake PT/OT, speech therapy MRI brain CTA head/neck without significant occlusion. Medical management Echo Allow permissive /120 X24 hours Check lipid panel, A1C, TSH, cardiac enzymes Neuro checks Q4 hours 
  
Afib On eliquis 
  
Right toe wound Consult wound care Followed by wound clinic outpatient Signed: 
JACKIE Fuentes

## 2020-02-01 NOTE — PROGRESS NOTES
01/31/20 2000 Dual Skin Pressure Injury Assessment Dual Skin Pressure Injury Assessment WDL Second Care Provider (Based on [address] [facility]) Hans Jr, [address] [facility] Skin Integumentary Skin Integumentary (WDL) X Skin Color Appropriate for ethnicity Skin Condition/Temp Warm;Dry Skin Integrity Abrasion 
(R.Leg first and second toes) Turgor Epidermis thin w/ loss of subcut tissue Hair Growth Present Varicosities Absent Wound Prevention and Protection Methods Orientation of Wound Prevention Posterior Location of Wound Prevention Sacrum/Coccyx Dressing Present  No  
Wound Offloading (Prevention Methods) Bed, pressure redistribution/air;Bed, pressure reduction mattress;Pillows;Repositioning

## 2020-02-01 NOTE — PROGRESS NOTES
Neurology Daily Progress Note Assessment:  
 
80year old male seen as a code S with slurred speech and left facial droop. Hx. A.fib on Eliquis, RMCA with left sided residual weakness. CT of the head was obtained and demonstrated remote large RMCA infarct, no acute intracranial abnormalities noted. CTA of head and neck demonstrated negative for LVO, 82-41% LICA stenosis. MRI of brain and TTE pending. Plan:  
 
· Continue ASA · Continue Eliquis · Continue high intensity statin · Neurochecks Q4H 
· MRI of brain pending · Bedside swallow test  
· Labs: A1c, FLP, TSH, Cardiac enzymes, BMP, CBC · Telemetry and echocardiogram with bubble study · PT/OT/ST 
· DVT prophylaxis · BP management - normotensive, with long-term goal <130/80 · Smoking cessation if applicable · Diabetes education if applicable · Depression Screening prior to discharge Subjective: Interval history: 
 
Patient doing well. Continues to have dysarthria and left sided facial droop. Left homonymous hemianopsia and left sided extinction. LUE with increased muscle tone. CTA of head and neck demonstrated negative for LVO, 27-74% LICA stenosis. MRI and TTE pending. History: 
 
Gillian Britt is a 80 y.o. male who is being seen for Code S. Review of systems negative with exception of pertinent positives and negatives noted above. Objective:  
 
Vitals:  
 02/01/20 0000 02/01/20 0400 02/01/20 0800 02/01/20 1200 BP: 180/80 153/68 135/76 148/68 Pulse: 62 68 70 90 Resp: 20 20 19 18 Temp: 98.4 °F (36.9 °C) 97.6 °F (36.4 °C) 97.3 °F (36.3 °C) 97.3 °F (36.3 °C) SpO2: 96% 94% 91% 90% Current Facility-Administered Medications:  
  [START ON 2/2/2020] atorvastatin (LIPITOR) tablet 80 mg, 80 mg, Oral, DAILY, Anne Marie Tran NP 
  amLODIPine (NORVASC) tablet 5 mg, 5 mg, Oral, DAILY, Allen BELLAMY, NP, Stopped at 02/01/20 0900 
  apixaban (ELIQUIS) tablet 2.5 mg, 2.5 mg, Oral, BID, Allen Lopez MIA NP, Stopped at 01/31/20 2000   aspirin chewable tablet 81 mg, 81 mg, Oral, DAILY, Missael Michelle NP, Stopped at 02/01/20 0900   escitalopram oxalate (LEXAPRO) tablet 20 mg, 20 mg, Oral, DAILY, Linda Wilder B, NP, Stopped at 02/01/20 0900 
  lisinopril (PRINIVIL, ZESTRIL) tablet 40 mg, 40 mg, Oral, DAILY, Linda BELLAMY, NP, Stopped at 02/01/20 0900 
  sodium chloride (NS) flush 5-40 mL, 5-40 mL, IntraVENous, Q8H, Linda Wilder B, NP, 10 mL at 02/01/20 8921   sodium chloride (NS) flush 5-40 mL, 5-40 mL, IntraVENous, PRN, Missael Michelle NP 
  acetaminophen (TYLENOL) tablet 650 mg, 650 mg, Oral, Q4H PRN, Missael Michelle, NP 
  metoprolol (LOPRESSOR) injection 1.25 mg, 1.25 mg, IntraVENous, Q4H PRN, Tamara French MD 
 
Recent Results (from the past 12 hour(s)) LIPID PANEL Collection Time: 02/01/20  5:33 AM  
Result Value Ref Range LIPID PROFILE Cholesterol, total 143 <200 MG/DL Triglyceride 106 35 - 150 MG/DL  
 HDL Cholesterol 53 40 - 60 MG/DL  
 LDL, calculated 68.8 <100 MG/DL VLDL, calculated 21.2 6.0 - 23.0 MG/DL  
 CHOL/HDL Ratio 2.7 HEMOGLOBIN A1C WITH EAG Collection Time: 02/01/20  5:33 AM  
Result Value Ref Range Hemoglobin A1c 6.5 (H) 4.8 - 6.0 % Est. average glucose 140 mg/dL CBC W/O DIFF Collection Time: 02/01/20  5:33 AM  
Result Value Ref Range WBC 7.9 4.3 - 11.1 K/uL  
 RBC 4.08 (L) 4.23 - 5.6 M/uL  
 HGB 13.0 (L) 13.6 - 17.2 g/dL HCT 39.7 (L) 41.1 - 50.3 % MCV 97.3 79.6 - 97.8 FL  
 MCH 31.9 26.1 - 32.9 PG  
 MCHC 32.7 31.4 - 35.0 g/dL  
 RDW 13.5 11.9 - 14.6 % PLATELET 446 386 - 217 K/uL MPV 12.4 (H) 9.4 - 12.3 FL ABSOLUTE NRBC 0.00 0.0 - 0.2 K/uL  
 
CT Results (most recent): 
Results from Hospital Encounter encounter on 01/31/20 CTA HEAD NECK W WO CONT Narrative Title:  CT arteriogram of the neck and head. Indication: TIA (transient ischemic attack). Technique: Axial images of the neck and head were obtained after the uneventful  
administration of intravenous iodinated contrast media. Contrast was used to 
best identify the arterial structures. Images were reviewed on a separate, free 
standing, three-dimensional workstation as per the referring physicians request. 
  
 
All stenosis percentages derived by comparing the narrowest segment with the 
distal Internal Carotid Artery luminal diameter, as described in the [employer] Symptomatic Carotid Endarterectomy Trial (NASCET) criteria. All CT scans at this facility are performed using dose reduction/dose modulation 
techniques, as appropriate the performed exam, including the following: Automated Exposure Control; Adjustment of the mA and/or kV according to patient 
size (this includes techniques or standardized protocols for targeted exams 
where dose is matched to indication/reason for exam); and Use of Iterative Reconstruction Technique. Comparison: Ultrasound 9/14/2017. Candance Huger Findings: 
 
Brain[de-identified] No midline shift or mass effect. No enhancing mass lesion. Low-density 
area in the right MCA territory as detailed on the recent CT head exam. 
Lungs:  No focal consolidation or pleural effusions. No suspicious pulmonary 
nodules. Candance Huger Bones:  No osseous destruction. . Kyphosis in the upper thoracic spine. Moderate 
to advanced multilevel degenerative changes with degenerative disc disease 
present throughout the upper cervical spine. Paranasal sinuses:  Paranasal sinuses are clear. Candance Huger Brain:  No midline shift or mass effect. No enhancing mass lesion. Soft tissues:  Partial opacification of the right maxillary sinus. Candance Huger Dural venous sinuses:  Patent. Aortic arch: Moderate calcific atherosclerosis. The aorta is nonaneurysmal.. Right brachiocephalic artery:  No significant stenosis or occlusion. .  . Right subclavian artery:  No significant stenosis or occlusion. .  . Left subclavian artery:  No significant stenosis or occlusion. .  . Right common carotid artery:  No significant stenosis or occlusion. .  . Right external carotid artery:  No significant stenosis or occlusion. .  . Right internal carotid artery:  No significant stenosis or occlusion. Jeff Kekaylah Atherosclerosis is present at the right ICA; however, there is no significant 
stenosis. Left common carotid artery: No significant stenosis or occlusion. .  . Left external carotid artery:  No significant stenosis or occlusion. .  . Left internal carotid artery:  There is a 60-70% stenosis at the origin of the 
left ICA. Jeff Keel Right vertebral artery:  No significant stenosis or occlusion. .. Left vertebral artery:  Mild stenosis at the origin of the left vertebral 
artery. Jeff Keel Basilar artery:  No significant stenosis, occlusion, or aneurysm. Jeff Keel Right middle cerebral artery:  Remote ischemic changes are present in the right 
MCA. No proximal right MCA cut off or occlusion. Right anterior cerebral artery:  No significant stenosis, occlusion, or 
aneurysm. Jeff Keel Anterior communicating artery: No significant stenosis, occlusion, or aneurysm. Jeff Keel Left middle cerebral artery:  No significant stenosis, occlusion, or aneurysm. Jeff Keel Left anterior cerebral artery:  No significant stenosis, occlusion, or 
aneurysm. Jeff Keel Right posterior communicating artery: No significant stenosis, occlusion, or 
aneurysm. Jeff Keel Left posterior communicating artery:  No significant stenosis, occlusion, or 
aneurysm. Jeff Keel Right posterior cerebral artery:  No significant stenosis, occlusion, or 
aneurysm. Jeff Keel Left posterior cerebral artery:  No significant stenosis, occlusion, or 
aneurysm. Jeff Gar Impression Impression: 1. No acute arterial cutoff or occlusion. Moderate diffuse calcific 
atherosclerosis. 2.  There is a 60-70% percent stenosis at the origin of the left ICA. Physical Exam: General - Well developed, well nourished, in no apparent distress. Pleasant and conversent. HEENT - Normocephalic, atraumatic. Conjunctiva are clear. Neck - Supple without masses Cardiovascular - Regular rate and rhythm. Normal S1, S2 without murmurs, rubs, or gallops. Lungs - Clear to auscultation. Abdomen - Soft, nontender with normal bowel sounds. Extremities - Peripheral pulses intact. No edema and no rashes. Neurological examination - Comprehension, attention, memory and reasoning are intact. Language and speech are dysarthric. On cranial nerve examination, (II, III, IV, VI) pupils are equal, round, and reactive to light. Left homonymous hemianopsia. Extraocular motility is normal. (V, VII) left facial droop and sensation is intact to light touch. (VIII) Hearing is intact. (IX, X) Palate and uvula elevate symmetrically. Voice is normal. (XI) Shoulder shrug is strong and equal bilaterally. (XII)Tongue is midline. Motor examination - There is increased muscle tone in LUE. Power is full throughout. Muscle stretch reflexes are normoactive and there are no pathological reflexes present. Plantar response is flexor. Sensation is intact to light touch, pinprick. Extinction on left. Cerebellar examination is normal finger/nose and heel/shin. Signed By: Umm Encinas NP February 1, 2020 Neurology attending staff Imaging reviewed. Seen and independently examined No additional findings as per above Await additional studies no specific major changes on our I think likely in terms of his regime. Does have atrial fibrillation and intrinsic cerebrovascular disease. Combination therapy therefore appropriate along with statins rate and blood pressure management

## 2020-02-01 NOTE — ED NOTES
TRANSFER - OUT REPORT: 
 
Verbal report given to Dayday Amezcua RN on Erin Covington  being transferred to  for routine progression of care Report consisted of patients Situation, Background, Assessment and  
Recommendations(SBAR). Information from the following report(s) SBAR was reviewed with the receiving nurse. Lines:  
Peripheral IV 01/31/20 Left Antecubital (Active) Site Assessment Clean, dry, & intact 1/31/2020  3:42 PM  
Phlebitis Assessment 0 1/31/2020  3:42 PM  
Infiltration Assessment 0 1/31/2020  3:42 PM  
Dressing Status Clean, dry, & intact 1/31/2020  3:42 PM  
   
Peripheral IV 01/31/20 Right Hand (Active) Site Assessment Clean, dry, & intact 1/31/2020  3:42 PM  
Phlebitis Assessment 0 1/31/2020  3:42 PM  
Infiltration Assessment 0 1/31/2020  3:42 PM  
Dressing Status Clean, dry, & intact 1/31/2020  3:42 PM  
  
 
Opportunity for questions and clarification was provided. Patient transported with: 
 Registered Nurse

## 2020-02-01 NOTE — DISCHARGE INSTRUCTIONS

## 2020-02-01 NOTE — PROGRESS NOTES
TRANSFER - IN REPORT: 
 
Verbal report received from JULIO C Barajas(name) on Elda [place]  being received from ED(unit) for routine progression of care Report consisted of patients Situation, Background, Assessment and  
Recommendations(SBAR). Information from the following report(s) SBAR was reviewed with the receiving nurse. Opportunity for questions and clarification was provided. Assessment completed upon patients arrival to unit and care assumed.

## 2020-02-01 NOTE — PROGRESS NOTES
Problem: Self Care Deficits Care Plan (Adult) Goal: *Acute Goals and Plan of Care (Insert Text) Description 1. Patient will complete bed mobility with minimal assistance to improve positioning for ADL tasks. 2. Patient will complete sitting at the edge of the bed at midline with minimal facilitation to prepare for ADL tasks. 3. Patient will demonstrate appropriate awareness of L UE during functional transfers with minimal verbal cues to improve safety. 4. Patient will tolerate standing for hygiene tasks and lower body clothing management with minimal assistance to improve balance for ADL tasks. 5. Patient will complete functional transfers to chair/BSC/WC with minimal assistance to improve independence for daily tasks. 6. Patient will tolerate 30 minutes of OT activity with 1-2 rest breaks to improve activity tolerance for ADL. 7. Patient will complete therapeutic exercises/activities for L UE with minimal facilitation to improve ability to use as functional assist during ADL tasks. Timeframe: 7 visits Outcome: Progressing Towards Goal 
  
OCCUPATIONAL THERAPY: Initial Assessment, Daily Note, and AM 2/1/2020 OBSERVATION: OT Visit Days: 1 Payor: [facility] MED[facility]ARE / Plan: [place] [facility] MEDICARE HMO/PPO / Product Type: [employer] Medicare /  
  
NAME/AGE/GENDER: Jose A Andujar is a 80 y.o. male PRIMARY DIAGNOSIS:  TIA (transient ischemic attack) [G45.9] TIA (transient ischemic attack) TIA (transient ischemic attack) ICD-10: Treatment Diagnosis:  
 · Generalized Muscle Weakness (M62.81) · Other lack of cordination (R27.8) · History of falling (Z91.81) · Abnormal posture (R29.3) Precautions/Allergies: 
   Patient has no known allergies. ASSESSMENT:  
 
Mr. Red Patel presents to the hospital with TIA work-up. Pt has hx of R MCA with L sided residual deficits. Pt's family reports most noticeable difference in his speech.  Pt is alert and supine in the bed with wife and friend at bedside. Pt states his name is \"Kris\". Pt required moderate assistance x 2 for bed mobility. Pt demonstrates impaired sitting balance with lean to the L side. Pt worked on improving midline orientation in sitting as well as standing tasks. Pt drooling out L side of mouth with pt working on wiping mouth and using the suction. Pt stood with moderate assistance x 2 with assistance for positioning of L UE during transfers. Pt demonstrates limited weightbearing through L LE and rotation of his trunk to the L. Pt's L UE rests in finger and wrist flexion with no grasp and release. Pt does have some functional movement at the shoulder/elbow. Pt attempted to stand for brief change and hygiene but fatigued fairly quickly with his second stand. Pt returned back to supine and pt assisted with hygiene with head of bed elevated. Pt's L handed provided with lafleur stretch and thumb stretch into extension to decrease tone to allow for rolled cloth to be placed in his hand for positioning. Pt's LUE placed on pillow at end of session. Pt appears to be functioning below his baseline and with ADL and functional transfers. Pt will benefit from OT services to address stated goals and plan of care. This section established at most recent assessment PROBLEM LIST (Impairments causing functional limitations): 1. Decreased Strength 2. Decreased ADL/Functional Activities 3. Decreased Transfer Abilities 4. Decreased Ambulation Ability/Technique 5. Decreased Balance 6. Decreased Activity Tolerance 7. Increased Fatigue 8. Decreased Flexibility/Joint Mobility 9. Decreased [place] with Home Exercise Program 
10. Decreased Cognition INTERVENTIONS PLANNED: (Benefits and precautions of occupational therapy have been discussed with the patient.) 1. Activities of daily living training 2. Adaptive equipment training 3. Balance training 4. Clothing management 5. Cognitive training 6. Donning&doffing training 7. Hygiene training 8. Neuromuscular re-eduation 9. Therapeutic activity 10. Therapeutic exercise 11. Wheelchair management TREATMENT PLAN: Frequency/Duration: Follow patient 3 times per week to address above goals. Rehabilitation Potential For Stated Goals: Good REHAB RECOMMENDATIONS (at time of discharge pending progress):   
Placement: It is my opinion, based on this patient's performance to date, that Mr. Travis Tafoya may benefit from intensive therapy at a [address] [place] [address] after discharge due to the functional deficits listed above that are likely to improve with skilled rehabilitation and concerns that he/she may be unsafe to be unsupervised at home due to risk for falls and further functional decline. . 
Equipment: ? TBD   
    
 
 
 
OCCUPATIONAL PROFILE AND HISTORY:  
History of Present Injury/Illness (Reason for Referral): 
See H&P Past Medical History/Comorbidities: Mr. Travis Tafoya  has a past medical history of Arrhythmia, Arthritis, CAD (coronary artery disease) (12/1999), Carotid artery stenosis without cerebral infarction (1/18/2016), Chronic pain, Claudication ([address] Coronary atherosclerosis of native coronary artery (1/18/2016), GERD (gastroesophageal reflux disease), Hyperlipidemia, Hypertension, Kidney stones, MI (myocardial infarction) ([address] (12/1999), PAF (paroxysmal atrial fibrillation) ([address] Stroke ([address] and Thromboembolus ([address] Mr. Travis Tafoya  has a past surgical history that includes pr abdomen surgery proc unlisted (2000?); hx appendectomy (age 15); hx heent (1's?); hx ptca; and pr cabg, artery-vein, three (12/1999). Social History/Living Environment:  
Home Environment: Private residence Living Alone: No 
Support Systems: Spouse/Significant Other/Partner Patient Expects to be Discharged to[de-identified] Rehabilitation facility Current DME Used/Available at Home: Cane, quad, Grab bars, Shower chair, Tub transfer bench, Wheelchair Tub or Shower Type: Shower Prior Level of Function/Work/Activity: 
Pt lives with his wife. Pt needs assistance for all functional transfers/ADL and is at least supervision level. Pt uses quad cane with assistance. Pt's wife assists him with bathing, dressing, and toileting. Pt has had one recent falls. Pt has aide come to the house for 3 times per week for 2 hours. Personal Factors:   
      Sex:  male Past/Current Experience:  recent decline with speech; hx of R MCA with L sided residual deficits Other factors that influence how disability is experienced by the patient:  multiple co-morbidities Number of Personal Factors/Comorbidities that affect the Plan of Care: Extensive review of physical, cognitive, and psychosocial performance (3+):  HIGH COMPLEXITY ASSESSMENT OF OCCUPATIONAL PERFORMANCE[de-identified]  
Activities of Daily Living:  
Basic ADLs (From Assessment) Complex ADLs (From Assessment) Feeding: Minimum assistance Oral Facial Hygiene/Grooming: Moderate assistance Bathing: Maximum assistance Upper Body Dressing: Maximum assistance Lower Body Dressing: Total assistance Toileting: Maximum assistance Instrumental ADL Meal Preparation: Total assistance Homemaking: Total assistance Grooming/Bathing/Dressing Activities of Daily Living Cognitive Retraining Safety/Judgement: Fall prevention;Home safety Bed/Mat Mobility Rolling: Moderate assistance Supine to Sit: Moderate assistance;Assist x2 Sit to Supine: Moderate assistance;Assist x2 Sit to Stand: Moderate assistance;Assist x2 Stand to Sit: Moderate assistance;Assist x2 Scooting: Maximum assistance Most Recent Physical Functioning:  
Gross Assessment: 
AROM: (R UE WFL; L UE limited ROM and flexed wrist/hand) Strength: (functional in R UE; LUE w/ L sided residual defcitis CVA) Coordination: (R UE functional; L UE non-functional ) Posture: 
  
Balance: 
Sitting: Impaired Sitting - Static: Fair (occasional) Sitting - Dynamic: Poor (constant support) Standing: Impaired Standing - Static: Poor Standing - Dynamic : Poor Bed Mobility: 
Rolling: Moderate assistance Supine to Sit: Moderate assistance;Assist x2 Sit to Supine: Moderate assistance;Assist x2 Scooting: Maximum assistance Wheelchair Mobility: 
  
Transfers: 
Sit to Stand: Moderate assistance;Assist x2 Stand to Sit: Moderate assistance;Assist x2 Patient Vitals for the past 6 hrs: 
 BP BP Patient Position SpO2 Pulse 20 1200 148/68 Sitting 90 % 90 Mental Status Neurologic State: Alert Orientation Level: Oriented to person Cognition: Follows commands Perception: Cues to attend to left side of body, Cues to maintain midline in sitting, Cues to maintain midline in standing Perseveration: No perseveration noted Safety/Judgement: Fall prevention, Home safety Physical Skills Involved: 1. Range of Motion 2. Balance 3. Strength 4. Activity Tolerance 5. Sensation 6. Fine Motor Control 7. Gross Motor Control Cognitive Skills Affected (resulting in the inability to perform in a timely and safe manner): 1. Perception 2. Executive Function 3. Divided Attention 4. Comprehension 5. Expression Psychosocial Skills Affected: 1. Habits/Routines Number of elements that affect the Plan of Care: 5+:  HIGH COMPLEXITY CLINICAL DECISION MAKIN [facility] [address] [id number] AM-PAC 6 Clicks Daily Activity Inpatient Short Form How much help from another person does the patient currently need. .. Total A Lot A Little None 1. Putting on and taking off regular lower body clothing? [x] 1   [] 2   [] 3   [] 4  
2. Bathing (including washing, rinsing, drying)? [] 1   [x] 2   [] 3   [] 4  
3. Toileting, which includes using toilet, bedpan or urinal?   [] 1   [x] 2   [] 3   [] 4  
4. Putting on and taking off regular upper body clothing?    [] 1   [x] 2   [] 3   [] 4  
 5. Taking care of personal grooming such as brushing teeth? [] 1   [x] 2   [] 3   [] 4  
6. Eating meals? [] 1   [] 2   [x] 3   [] 4  
© 2007, Trustees of [address] [place] [address] [address] [address], under license to [employer]. All rights reserved Score:  Initial: 12 Most Recent: X (Date: -- ) Interpretation of Tool:  Represents activities that are increasingly more difficult (i.e. Bed mobility, Transfers, Gait). Medical Necessity:    
· Patient demonstrates · good ·  rehab potential due to higher previous functional level. Reason for Services/Other Comments: 
· Patient continues to require skilled intervention due to · Decreased independence with ADL/functional transfers · . Use of outcome tool(s) and clinical judgement create a POC that gives a: MODERATE COMPLEXITY  
 
 
 
TREATMENT:  
(In addition to Assessment/Re-Assessment sessions the following treatments were rendered) Pre-treatment Symptoms/Complaints:   
Pain: Initial:  
Pain Intensity 1: 0  Post Session:  0 Today's treatment session addressed Decreased Strength, Decreased ADL/Functional Activities, Decreased Transfer Abilities, Decreased Ambulation Ability/Technique, Decreased Balance, Decreased Activity Tolerance, Increased Fatigue, Decreased Flexibility/Joint Mobility, Decreased [place] with Home Exercise Program, and Decreased Cognition to progress towards achieving goal(s) stated above. During this session,  Physical Therapy addressed  Balance to progress towards their discipline specific goal(s). Co-treatment was necessary to improve patient's cognitive participation, ability to follow higher level commands, and ability to increase activity demands. Self Care: (23 minutes): Procedure(s) (per grid) utilized to improve and/or restore self-care/home management as related to dressing, toileting, and grooming.  Required moderate  visual, verbal, manual, and tactile cueing to facilitate activities of daily living skills and pt working on activities to prepare for ADL such as sitting balance/posture and positioning in preparation for the above stated ADL tasks. Braces/Orthotics/Lines/Etc:  
·  None Treatment/Session Assessment:   
· Response to Treatment:  Pt tolerated it with lalita. · Interdisciplinary Collaboration:  
o Physical Therapist 
o Occupational Therapist 
o Registered Nurse · After treatment position/precautions:  
o Supine in bed 
o Bed/Chair-wheels locked 
o Call light within reach 
o RN notified 
o Family at bedside · Compliance with Program/Exercises: Will assess as treatment progresses. · Recommendations/Intent for next treatment session: \"Next visit will focus on advancements to more challenging activities and reduction in assistance provided\". Total Treatment Duration: OT Patient Time In/Time Out Time In: 9285 Time Out: 3769 Stacie Reyes OT

## 2020-02-01 NOTE — PROGRESS NOTES
SPEECH PATHOLOGY NOTE: 
Consult received and chart reviewed and discussed with Pallavi Henley RN. Attempted to see patient for assessment, but currently working with PT/OT. Will try again at a later time as schedule permits and patient available.  
Brit Allen MA, CCC-SLP

## 2020-02-02 PROBLEM — I63.9 CVA (CEREBRAL VASCULAR ACCIDENT) (HCC): Status: ACTIVE | Noted: 2020-01-01

## 2020-02-02 NOTE — PROGRESS NOTES
Patient alert and oriented x 3. Patient NPO admitted for TIA. Patient spouse was concerned about  not eating and made a request for Iv fluid yesterday. MD made aware.

## 2020-02-02 NOTE — PROGRESS NOTES
02/01/20 1900 NIH Stroke Scale Interval Other (comment) 
(Dual with Cindy, RN )  
LOC 0  
LOC Questions 0 LOC Commands 0 Best Gaze 0 Visual 0 Facial Palsy 1 Motor Right Arm 0 Motor Left Arm 1 Motor Right Leg 0 Motor Left Leg 0 Limb Ataxia 0 Sensory 1 Best Language 0 Dysarthria 1 Extinction and Inattention 0 Total 4

## 2020-02-02 NOTE — CONSULTS
Admit Date: 1/31/2020 Referring NP: Joseph Esposito Medical Decision Making/Plan/Recommend: Medical chart reviewed. Patient seen and examined. 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 Initial head CT and MRI were negative for acute changes. F/u MRI on 2/1 with results pending. Dysarthria/facial droop is improving. Seen by therapy with current level of function; bed mobility and transfers - max A, poor sitting balance, dysphagia - on pureed/NTL diet. PE: L facial droop, drooling, follows commands, oriented, cooperative, L UE 3-/5, L wrist/hand flexion contracture, able to extend L elbow, L LE - 3-/5, no ankle clonus. PLOF: lives with spouse, household ambulation with quad cane, spouse assisted with ADLs, had home health aide 3x/week. Did not drive. MBS scheduled for tomorrow. Will follow and discuss with Dr. Winifred Garcia and rehab admission coordinators tomorrow. Thank you for the opportunity to participate in the care of this patient.

## 2020-02-02 NOTE — PROGRESS NOTES
Hospitalist Progress Note Admit Date:  2020  3:39 PM  
Name:  Darryl Armenta  
Age:  80 y.o. 
:  1934 MRN:  [id number] PCP:  Gaby Parnell MD 
Treatment Team: Attending Provider: Juan Antonio Mccann MD; Nurse Practitioner: Emily Rosenbaum NP; Utilization Review: Jovana Paiz RN; Consulting Provider: River Mixon MD 
 
Subjective: HPI and or CC: 
 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 2: 
Patient is alert and oriented x3. Slurred speech remains but improving per nursing staff. Spouse at bedside. Update provided. Neuro following, MRI, echo, CTA head/neck 60-70% stenosis left ICA. ST eval done today, patient able to have nectar thick, mechanical soft diet. CM assisting with SNF options. Objective:  
 
Patient Vitals for the past 24 hrs: 
 Temp Pulse Resp BP SpO2  
20 0800 97.9 °F (36.6 °C) 84 18 155/74 94 % 20 0400 97.2 °F (36.2 °C) 70 18 155/56 94 % 20 0000 97.7 °F (36.5 °C) 65 20 184/84 92 % 20 2000 97.8 °F (36.6 °C) 61 18 185/87 93 % 20 1600 97.6 °F (36.4 °C) 65 19 158/76 96 % Oxygen Therapy O2 Sat (%): 94 % (20 0800) Pulse via Oximetry: 67 beats per minute (20 1829) No intake or output data in the 24 hours ending 20 1204 REVIEW OF SYSTEMS: Comprehensive ROS performed and negative except as stated in HPI. Physical Examination: 
General:       Alert, cooperative, no distress, appears stated age. Head:            Normocephalic, without obvious abnormality, atraumatic. Nose:            Nares normal. No drainage or sinus tenderness. Lungs:          CTA, resp even and nonlabored Heart:            S1S2 present without murmurs rubs gallops. Irregular. No LE edema. Abdomen:    Soft, non-tender. Not distended. Bowel sounds normal. No masses Extremities: No cyanosis. Significant residual LUE weakness from previous CVA. RUE strength 4/5, LUE strength 1/5. Skin:             Texture, turgor normal. No rashes or lesions. Not Jaundiced. Right great toe with bandage c/d/i Neurologic:  Alert and oriented X3. PERRLA. Left facial droop. Slurred speech.   
  
 
Data Review: 
I have reviewed all labs, meds, telemetry events, and studies from the last 24 hours. Recent Results (from the past 24 hour(s)) CBC WITH AUTOMATED DIFF Collection Time: 02/02/20  5:39 AM  
Result Value Ref Range WBC 6.5 4.3 - 11.1 K/uL  
 RBC 4.16 (L) 4.23 - 5.6 M/uL  
 HGB 13.5 (L) 13.6 - 17.2 g/dL HCT 40.3 (L) 41.1 - 50.3 % MCV 96.9 79.6 - 97.8 FL  
 MCH 32.5 26.1 - 32.9 PG  
 MCHC 33.5 31.4 - 35.0 g/dL  
 RDW 13.6 11.9 - 14.6 % PLATELET 130 591 - 420 K/uL MPV 11.6 9.4 - 12.3 FL ABSOLUTE NRBC 0.00 0.0 - 0.2 K/uL  
 DF AUTOMATED NEUTROPHILS 63 43 - 78 % LYMPHOCYTES 23 13 - 44 % MONOCYTES 10 4.0 - 12.0 % EOSINOPHILS 3 0.5 - 7.8 % BASOPHILS 1 0.0 - 2.0 % IMMATURE GRANULOCYTES 0 0.0 - 5.0 %  
 ABS. NEUTROPHILS 4.0 1.7 - 8.2 K/UL  
 ABS. LYMPHOCYTES 1.5 0.5 - 4.6 K/UL  
 ABS. MONOCYTES 0.7 0.1 - 1.3 K/UL  
 ABS. EOSINOPHILS 0.2 0.0 - 0.8 K/UL  
 ABS. BASOPHILS 0.0 0.0 - 0.2 K/UL  
 ABS. IMM. GRANS. 0.0 0.0 - 0.5 K/UL METABOLIC PANEL, BASIC Collection Time: 02/02/20  5:39 AM  
Result Value Ref Range Sodium 146 (H) 136 - 145 mmol/L Potassium 4.0 3.5 - 5.1 mmol/L Chloride 112 (H) 98 - 107 mmol/L  
 CO2 27 21 - 32 mmol/L Anion gap 7 7 - 16 mmol/L Glucose 92 65 - 100 mg/dL BUN 24 (H) 8 - 23 MG/DL Creatinine 1.13 0.8 - 1.5 MG/DL  
 GFR est AA >60 >60 ml/min/1.73m2 GFR est non-AA >60 >60 ml/min/1.73m2 Calcium 9.2 8.3 - 10.4 MG/DL All Micro Results None Current Meds: 
Current Facility-Administered Medications Medication Dose Route Frequency  dextrose 5% and 0.9% NaCl infusion  75 mL/hr IntraVENous CONTINUOUS  
 atorvastatin (LIPITOR) tablet 80 mg  80 mg Oral DAILY  tuberculin injection 5 Units  5 Units IntraDERMal ONCE  
 amLODIPine (NORVASC) tablet 5 mg  5 mg Oral DAILY  apixaban (ELIQUIS) tablet 2.5 mg  2.5 mg Oral BID  aspirin chewable tablet 81 mg  81 mg Oral DAILY  escitalopram oxalate (LEXAPRO) tablet 20 mg  20 mg Oral DAILY  lisinopril (PRINIVIL, ZESTRIL) tablet 40 mg  40 mg Oral DAILY  sodium chloride (NS) flush 5-40 mL  5-40 mL IntraVENous Q8H  
 sodium chloride (NS) flush 5-40 mL  5-40 mL IntraVENous PRN  
 acetaminophen (TYLENOL) tablet 650 mg  650 mg Oral Q4H PRN  
 metoprolol (LOPRESSOR) injection 1.25 mg  1.25 mg IntraVENous Q4H PRN Diet: DIET PUREED Other Studies (last 24 hours): No results found. Assessment and Plan:  
 
Hospital Problems as of 2/2/2020 Never Reviewed Codes Class Noted - Resolved POA * (Principal) TIA (transient ischemic attack) ICD-10-CM: G45.9 ICD-9-CM: 435.9  1/31/2020 - Present Unknown CAD (coronary artery disease) (Chronic) ICD-10-CM: I25.10 ICD-9-CM: 414.00  10/6/2015 - Present Yes Hypertension (Chronic) ICD-10-CM: I10 
ICD-9-CM: 401.9  10/6/2015 - Present Yes PAF (paroxysmal atrial fibrillation) (HCC) (Chronic) ICD-10-CM: I48.0 ICD-9-CM: 427.31  10/6/2015 - Present Yes Overview Signed 4/26/2017 12:59 PM by Vivi Garcia MD  
  chads vasc 6 Dyslipidemia (Chronic) ICD-10-CM: U04.4 ICD-9-CM: 272.4  10/6/2015 - Present Yes A/P:   
TIA r/o CVA Neurology following On eliquis and ASA, statin Swallowing screen prior to oral intake PT/OT, speech therapy MRI brain CTA head/neck without significant occlusion. Medical management for now, will consult vascular. Echo 55-60% EF. Allow permissive /120 X24 hours Check lipid panel, A1C-6.5, TSH, cardiac enzymes Neuro checks Q4 hours 
  
Afib On eliquis 
  
Right toe wound Consult wound care Followed by wound clinic outpatient Signed: 
JACKIE Kimble

## 2020-02-02 NOTE — PROGRESS NOTES
Patient resting peacefully Wife asleep in chair Reviewed notes Will follow up Eladio Linder, staff Yunier avery 05, [address] [facility]  /   [email or web address]

## 2020-02-02 NOTE — PHYSICIAN ADVISORY
Letter of Status Determination:  
Recommend hospitalization status upgraded from OBSERVATION to INPATIENT Status Pt Name:  Suad Braden  
MR#  
ZULMA # [id number] / 
[id number] Payor[place] Bret [place] / [place] Fl[place]imi [place] / Product Type: Managed Care Medicare /   
[facility]#  [id number] Room and Hospital  [facility]/  @ [address] [facility] Hospitalization date  1/31/2020  3:39 PM  
Current Attending Physician  Carri Pérez MD  
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 Initial head CT and MRI were negative for acute changes but patient continued to have symptoms and  F/u MRI on 2/1 with results pending. Dysarthria/facial droop is improving. Currently;y max assist, for MBS in am.  
  
[place] (MCG) criteria Does apply M-83: Acute ischemic CVA STATUS DETERMINATION  Based on documented presenting clinical data, comorbid conditions, high risk of adverse events and deterioration, it is our recommendation that the patient's status should be upgraded from OBSERVATION to INPATIENT status. The final decision of the patient's hospitalization status depends on the attending physician's judgment. Additional comments Payor: Bret Moralez [place] / [place] [employer] / Product Type: Managed Care Medicare / The information in this document is a recommendation to be used for utilization review and utilization management purposes only. This recommendation is not an order. The recommendation is made based on the information reviewed at the time of the referral, is pursuant to the [facility] Conditions of Participation (42 CFR Part 482), and is neither a judgment nor an assessment with regard to the appropriateness or quality of clinical care. For all Managed Care patients: The Criteria are intended solely for use as screening guidelines with respect to the medical appropriateness of healthcare services and not for final clinical or payment determinations concerning the type or level of medical care provided, or proposed to be provided, to a patient. They help the reviewers determine whether a patient is appropriate for observation or inpatient admission at the time a decision to admit the patient is being made. All efforts are made to apply the pertinent payor criteria (MCG or [employer]) as well as the clinical judgements based on the information reviewed at the time of the referral.\" Nothing in this document may be used to limit, alter, or affect clinical services provided to the patient named below. Mary Lou Armenta MD 
Physician Advisor [address], [address] [place] [address] C:  
[email or web address]

## 2020-02-02 NOTE — PROGRESS NOTES
Problem: Dysphagia (Adult) Goal: *Speech Goal: (INSERT TEXT) Description LTG: Patient will tolerate least restrictive diet without overt signs or symptoms of airway compromise by discharge. STG: Patient will tolerate pureed diet and nectar-thick liquids without overt signs or symptoms of aspiration 100% of the time. STG: Patient will utilize compensatory strategies of slow rate and lingual sweep with min cues to improve swallow safety. STG: Patient will perform oral motor and laryngeal strengthening exercises x10 each with 80% accuracy to improve strength and coordination of swallowing function. STG: Patient will participate in modified barium swallow study as clinically indicated. Outcome: Progressing Towards Goal 
  
SPEECH LANGUAGE PATHOLOGY: DYSPHAGIA- Initial Assessment OBSERVATION PATIENT 
 
NAME/AGE/GENDER: Tyron Hobson is a 80 y.o. male DATE: 2/2/2020 PRIMARY DIAGNOSIS: TIA (transient ischemic attack) [G45.9] ICD-10: Treatment Diagnosis: R13.12 Dysphagia, Oropharyngeal Phase INTERDISCIPLINARY COLLABORATION: Registered Nurse and Nurse Practitioner PRECAUTIONS/ALLERGIES: Patient has no known allergies. SUBJECTIVE Patient awake and alert. Family present in room. History of Present Injury/Illness: Mr. Nicko Caba  has a past medical history of Arrhythmia, Arthritis, CAD (coronary artery disease) (12/1999), Carotid artery stenosis without cerebral infarction (1/18/2016), Chronic pain, Claudication ([facility] [address] Coronary atherosclerosis of native coronary artery (1/18/2016), GERD (gastroesophageal reflux disease), Hyperlipidemia, Hypertension, Kidney stones, MI (myocardial infarction) ([address] (12/1999), PAF (paroxysmal atrial fibrillation) ([address] Stroke ([address] and Thromboembolus ([address] . He also  has a past surgical history that includes pr abdomen surgery proc unlisted (2000?); hx appendectomy (age 15); hx heent (1's?); hx ptca; and pr cabg, artery-vein, three (12/1999). Problem List:  (Impairments causing functional limitations): 1. dysphagia Previous Dysphagia: YES Patient seen by SLP services September 2017 following CVA with recommendations for puree and nectar thick liquids. Family reports patient was consuming regular textures and thin liquids prior to this hospitalization Diet Prior to Evaluation: NPO Orientation:  
Person Pain: Pain Scale 1: Numeric (0 - 10) Pain Intensity 1: 0 Cognitive-Linguistic Screen: 
? Speech Production:  
o Needs further assessment ? Expressive Language: 
o Needs further assessment ? Receptive Language: 
o Needs further assessment ? Cognition:  
o Needs further assessment OBJECTIVE Oral Motor:  
· Labial: Decreased rate, Impaired coordination and Left droop · Dentition: Natural 
· Oral Hygiene: Adequate · Lingual: Decreased rate and Impaired coordination Swallow assessment: 
 Patient presented with ice chips, thin liquids by spoon and cup sip, nectar liquids by spoon and cup sip, applesauce. ASSESSMENT Patient presents with moderate oropharyngeal dysphagia as characterized by prolonged oral holding of all presentations. Patient exhibited strong cough response to single cup sip of thin liquids. No overt signs/symptoms of aspiration observed with nectar thick liquids by cup sip or applesauce. Mild left buccal residue after approximately 3 oz of applesauce. Solids deferred due to prolonged oral manipulation of applesauce requiring occasional cues to initiate swallow and oral residue after swallow. Adequate laryngeal excursion palpated during swallows of nectar and applesauce and voice clear after swallow. Recommend initiate pureed textures and nectar thick liquids by cup sip. Give meds whole in puree. Patient should be fully alert and upright for all PO intake. Supervision with all PO and patient may require cue to initiate swallow. Check left oral cavity for pocketing.  Patient would benefit from further objective assessment of pharyngeal phase of swallow via Modified Barium Swallow study. Discussed results/recommendations with Ladonna Councilman, NP. Will schedule MBS for tomorrow AM. Tool Used: Dysphagia Outcome and Severity Scale (JOSÉ) Score Comments Normal Diet  [] 7 With no strategies or extra time needed Functional Swallow  [] 6 May have mild oral or pharyngeal delay Mild Dysphagia  [] 5 Which may require one diet consistency restricted Mild-Moderate Dysphagia  [] 4 With 1-2 diet consistencies restricted Moderate Dysphagia  [] 3 With 2 or more diet consistencies restricted Moderate-Severe Dysphagia  [] 2 With partial PO strategies (trials with ST only) Severe Dysphagia  [] 1 With inability to tolerate any PO safely Score:  Initial: 3 Most Recent: 3 (Date 02/02/20 ) Interpretation of Tool: The Dysphagia Outcome and Severity Scale (JOSÉ) is a simple, easy-to-use, 7-point scale developed to systematically rate the functional severity of dysphagia based on objective assessment and make recommendations for diet level, independence level, and type of nutrition. Current Medications: No current facility-administered medications on file prior to encounter. Current Outpatient Medications on File Prior to Encounter Medication Sig Dispense Refill  amLODIPine (NORVASC) 5 mg tablet Take 1 Tab by mouth daily. 90 Tab 3  
 apixaban (ELIQUIS) 2.5 mg tablet Take 1 Tab by mouth two (2) times a day. 180 Tab 3  
 atorvastatin (LIPITOR) 80 mg tablet Take 0.5 Tabs by mouth daily. 90 Tab 3  
 lisinopril (PRINIVIL, ZESTRIL) 40 mg tablet Take 1 Tab by mouth daily. 90 Tab 3  
 allopurinol (ZYLOPRIM) 100 mg tablet Take 100 mg by mouth daily.  aspirin 81 mg chewable tablet Take 81 mg by mouth daily.  nitroglycerin (NITROSTAT) 0.4 mg SL tablet 1 Tab by SubLINGual route every five (5) minutes as needed for Chest Pain.  2 Bottle 4  
  escitalopram oxalate (LEXAPRO) 10 mg tablet Take 20 mg by mouth daily. PLAN   
FREQUENCY/DURATION: Continue to follow patient 3 times a week for duration of hospital stay to address above goals. - Recommendations for next treatment session: Next treatment will address MBS 
  
REHABILITATION POTENTIAL FOR STATED GOALS: Good COMPLIANCE WITH PROGRAM/EXERCISES: Will assess as treatment progresses CONTINUATION OF SKILLED SERVICES/MEDICAL NECESSITY: 
? Patient is expected to demonstrate progress in  swallow strength, swallow timeliness, swallow function, diet tolerance and swallow safety in order to  improve swallow safety, work toward diet advancement and decrease aspiration risk. ? Patient continues to require skilled intervention due to oropharyngeal dysphagia. RECOMMENDATIONS  
DIET:  
? PO:  Pureed ? Liquids:  nectar by cup MEDICATIONS: Whole in puree ASPIRATION PRECAUTIONS · Slow rate of intake · Small bites/sips · Upright at 90 degrees during meal 
· No straws COMPENSATORY STRATEGIES/MODIFICATIONS · Check left cheek after PO for pocketing EDUCATION: 
· Recommendations discussed with Hospitalist 
· Nursing · Family · Patient RECOMMENDATIONS for CONTINUED SPEECH THERAPY:  
YES: Anticipate need for ongoing speech therapy during this hospitalization and at next level of care. SAFETY: 
After treatment position/precautions: · Upright in bed · RN and NP notified · Family at bedside Total Treatment Duration:  
Time In: 5740 Time Out: 6412 Natalie Worrell MA, CCC-SLP

## 2020-02-02 NOTE — PROGRESS NOTES
LMSW added a referral to [facility] for pt as they decided they prefer that as second choice over [place]. Await bed offers. Pt insurance [facility] Medicare will require a precert.

## 2020-02-02 NOTE — PROGRESS NOTES
02/02/20 0732 NIH Stroke Scale Interval Other (comment) 
(Dual [facility] with JULIO C MCKAY)  
LOC 0  
LOC Questions 0 LOC Commands 0 Best Gaze 0 Visual 0 Facial Palsy 1 Motor Right Arm 0 Motor Left Arm 1 Motor Right Leg 0 Motor Left Leg 0 Limb Ataxia 0 Sensory 1 Best Language 0 Dysarthria 1 Extinction and Inattention 0 Total 4

## 2020-02-02 NOTE — PROGRESS NOTES
LMSW met with pt and family friend at bedside. Spoke with spouse at home by phone while at bedside. Therapies have recommended rehab for pt. Pt has a previous positve experience at [facility] and requested a referral to [facility] as a second option. Referrals placed. Also left SNF list for spouse to review for other options if  [facility] do not accept pt. Will follow up. Care Management Interventions PCP Verified by CM: Yes(Cedric Fonseca III) Mode of Transport at Discharge: Other (see comment)(Madiha) Transition of Care Consult (CM Consult): SNF(Pt is insured by [employer] which includes pharmacy benefits.  ) Partner SNF: Yes Discharge Durable Medical Equipment: No(Pt has a quad cane, wheelchair, shower chair and tub transfer bench per spouse report.  ) Physical Therapy Consult: Yes Occupational Therapy Consult: Yes Speech Therapy Consult: Yes Current Support Network: Lives with Spouse(Pt lives with spouse who is supportive of pt care as needed.) Confirm Follow Up Transport: Family The Plan for Transition of Care is Related to the Following Treatment Goals : Pt will need rehab placement to return to his functional baseline in the community. The Patient and/or Patient Representative was Provided with a Choice of Provider and Agrees with the Discharge Plan?: Yes Name of the Patient Representative Who was Provided with a Choice of Provider and Agrees with the Discharge Plan: spouse Freedom of Choice List was Provided with Basic Dialogue that Supports the Patient's Individualized Plan of Care/Goals, Treatment Preferences and Shares the Quality Data Associated with the Providers?: Yes [employer] Information Provided?: No 
Discharge Location Discharge Placement: Skilled nursing facility

## 2020-02-02 NOTE — PROGRESS NOTES
02/02/20 1848 NIH Stroke Scale Interval Other (comment) 
(Dual [facility] with Cindy RN)

## 2020-02-02 NOTE — CONSULTS
[facility] [address] [place] [address] Phone: [phone] Fax: [phone] Subjective:  
  
Macey Hensley is a 80 y.o. male who presents for evaluation of left carotid stenosis and recent, acute left hemispheric CVA. Patient with known carotid stenosis seen previously in 2017 by Dr Roseann Gannon when he was then admitted with a right hemispheric CVA. From reading the records it appears that right carotid endarterectomy was recommended but never performed. Discussed this with patient and family. At that time patient had approx 50% ORTIZ stenosis and 42% LICA stenosis. He is now admitted with (presumably) left hemispheric CVA manifested by dysphasia. MRI has not been read, CTA shows \"60-70%\" left ICA stenosis per radiology report. Speech is about the same as at admission, no right-body motor/sensory deficits. Patient apparently failed bedside swallowing test. 
 
 
Past Medical History:  
Diagnosis Date  Arrhythmia   
 reason for current procedure  Arthritis   
 osteo  CAD (coronary artery disease) 12/1999 CABG no stents  Carotid artery stenosis without cerebral infarction 1/18/2016  Chronic pain   
 arthritic  Claudication ([address]  Coronary atherosclerosis of native coronary artery 1/18/2016  GERD (gastroesophageal reflux disease)   
 controlled with meds  Hyperlipidemia  Hypertension   
 controlled with meds  Kidney stones   
 hx of  
 MI (myocardial infarction) ([address] 12/1999  PAF (paroxysmal atrial fibrillation) ([address]  Stroke ([address]  Thromboembolus ([address] Past Surgical History:  
Procedure Laterality Date [address] [place] [address] UNLISTED  2000? hernia repair  CABG, ARTERY-VEIN, THREE  12/1999 CABG no stents  HX APPENDECTOMY  age 15  
 HX HEENT  12's?  
 left cataract  HX PTCA Family History Problem Relation Age of Onset  Coronary Artery Disease Other family history Social History Socioeconomic History  Marital status:  Spouse name: Not on file  Number of children: Not on file  Years of education: Not on file  Highest education level: Not on file Tobacco Use  Smoking status: Former Smoker Packs/day: 1.50 Years: 35.00 Pack years: 52.50 Last attempt to quit: 10/1/1986 Years since quittin.3  Smokeless tobacco: Never Used  Tobacco comment: quit  Substance and Sexual Activity  Alcohol use: Yes Alcohol/week: 6.7 standard drinks Types: 7 Glasses of wine, 1 Shots of liquor per week Comment: minimal  
 Drug use: No  
  Types: Prescription, OTC Current Facility-Administered Medications Medication Dose Route Frequency  dextrose 5% and 0.9% NaCl infusion  75 mL/hr IntraVENous CONTINUOUS  
 atorvastatin (LIPITOR) tablet 80 mg  80 mg Oral DAILY  tuberculin injection 5 Units  5 Units IntraDERMal ONCE  
 amLODIPine (NORVASC) tablet 5 mg  5 mg Oral DAILY  apixaban (ELIQUIS) tablet 2.5 mg  2.5 mg Oral BID  aspirin chewable tablet 81 mg  81 mg Oral DAILY  escitalopram oxalate (LEXAPRO) tablet 20 mg  20 mg Oral DAILY  lisinopril (PRINIVIL, ZESTRIL) tablet 40 mg  40 mg Oral DAILY  sodium chloride (NS) flush 5-40 mL  5-40 mL IntraVENous Q8H  
 sodium chloride (NS) flush 5-40 mL  5-40 mL IntraVENous PRN  
 acetaminophen (TYLENOL) tablet 650 mg  650 mg Oral Q4H PRN  
 metoprolol (LOPRESSOR) injection 1.25 mg  1.25 mg IntraVENous Q4H PRN No Known Allergies Review of Systems: A comprehensive review of systems was negative except for that written in the History of Present Illness. Objective:  
 
Patient Vitals for the past 8 hrs: 
 BP Temp Pulse Resp SpO2  
20 1200 128/76 97.9 °F (36.6 °C) 60 19 91 % 20 0800 155/74 97.9 °F (36.6 °C) 84 18 94 % Temp (24hrs), Av.7 °F (36.5 °C), Min:97.2 °F (36.2 °C), Max:97.9 °F (36.6 °C) Physical Exam: 
Visit Vitals /76 (BP 1 Location: Left arm, BP Patient Position: Sitting) Pulse 60 Temp 97.9 °F (36.6 °C) Resp 19 SpO2 91% General appearance: alert, cooperative, no distress, appears stated age Neurologic: Impaired speech. Motor/sensory grossly intact and symmetric. Assessment: Antiplatelet therapy per medicine/neurology. Role for possible carotid surgery was discussed with patient and family - will depend on factors such as MRI findings (unknown at this time) and clinical course regarding stabilization of neuro deficits. All questions answered. Plan: As above in [place]" Signed By: Clare Andrew MD   
 February 2, 2020 Elements of this note have been dictated using speech recognition software. As a result, errors of speech recognition may have occurred.

## 2020-02-03 NOTE — ADT AUTH CERT NOTES
INITIAL PHYSICIAN ORDER: INPATIENT Remote Telemetry; 3. Patient receiving treatment that can only be provided in an inpatient setting (further clarification in H&P documentation) [[id number]] (Order [id number]) ADT Transfer Date and Time: 2/2/2020  4:17 PM Department: [facility] 7 MED SURG Released By/Authorizing: Shyla Herrera NP (auto-released) Order Providers Authorizing Shyla Herrera  
  
   
Order Information Order Date/Time Release Date/Time  
02/02/20 04:16 PM 02/02/20 04:17 PM  
CSN:  
[id number] Order Details Frequency Duration Priority Order Class ONE TIME 1  occurrence Routine ADT Pend Transfer Reprint OrderRequisition - Outpatient Only INITIAL PHYSICIAN ORDER: INPATIENT (Order #[id number]) on 2/2/20 Original Order Ordered On Ordered By   
2/2/2020  4:16 PM Shyla Herrera NP   
    
  
   
Transfer Level of Care Department [facility] ADMINISTRATION Order Questions Question Answer Comment Status: INPATIENT Type of Bed Remote Telemetry Inpatient Hospitalization Certified Necessary for the Following Reasons 3. Patient receiving treatment that can only be provided in an inpatient setting (further clarification in H&P documentation) Admitting Diagnosis CVA (cerebral vascular accident) [facility] Admitting Physician Carrie Guerrero Attending Physician Carrie Guerrero Estimated Length of Stay 2 Midnights Discharge Plan: Other (Specify)   
  
   
Collection Information Acknowledgement Info For [facility] By Acknowledged On Placing Order 02/02/20 1616 [facility] 02/02/20 [address]457 [address] Action Created on Responsible Provider Signed by Signed on  
Ordering 02/02/20 1616 MD Tresa Mason MD 02/02/20 0797 Cosign Info Action Created on Responsible Provider Signed by Signed on  
Ordering 02/02/20 161MD Tresa Zurita MD 02/02/20 7214 Additional Information Associated Reports External References View Encounter CLICK HERE-** FAQ-NEW CMS RULES FOR INPATIENT CERTIFICATION **  
Priority and Order Details Process Instructions Inpatient admit orders should be restricted to patients who are expected to require care that spans at least two consecutive midnights, starting from the time care was initiated at this facility, including in the emergency department or other outpatient departments. That means that the first midnight may have already passed at the time this order is signed. If this criterion cannot be met, an observation services order should be placed instead. Surgeries on the Medicare Inpatient Only list are exempted from the two midnight benchmark and should be inpatients regardless of their length of stay. CHANGE IN MEDICARE CERTIFICATION REQUIREMENT:  
Signing this order, I hereby certify that the inpatient services I have ordered are ordered in accordance with applicable Medicare/Medicaid regulations and the inpatient services I have ordered are both reasonable and necessary for the reasons identified above. NPI Information  
 
  
Electronically signed by Authorizing Provider: Everardo Mansfield [id number]     Order start date/time: 2/2/2020 4:30 PM 
Electronically signed by Co-signing Provider (if required):     
   
  
   
Order Report Order Details Tracking Links [id number]

## 2020-02-03 NOTE — PROGRESS NOTES
CM awaiting referrals acceptance from [address] , [place]. Messages sent to liaisons. [facility] following. CM will continue to follow for discharge planning. Care Management Interventions PCP Verified by CM: Yes(Cedric Fonseca III) Mode of Transport at Discharge: Other (see comment)(Madiha) Transition of Care Consult (CM Consult): SNF(Pt is insured by [employer] which includes pharmacy benefits.  ) Partner SNF: Yes Discharge Durable Medical Equipment: No(Pt has a quad cane, wheelchair, shower chair and tub transfer bench per spouse report.  ) Physical Therapy Consult: Yes Occupational Therapy Consult: Yes Speech Therapy Consult: Yes Current Support Network: Lives with Spouse(Pt lives with spouse who is supportive of pt care as needed.) Confirm Follow Up Transport: Family The Plan for Transition of Care is Related to the Following Treatment Goals : Pt will need rehab placement to return to his functional baseline in the community. The Patient and/or Patient Representative was Provided with a Choice of Provider and Agrees with the Discharge Plan?: Yes Name of the Patient Representative Who was Provided with a Choice of Provider and Agrees with the Discharge Plan: spouse Freedom of Choice List was Provided with Basic Dialogue that Supports the Patient's Individualized Plan of Care/Goals, Treatment Preferences and Shares the Quality Data Associated with the Providers?: Yes [employer] Information Provided?: No 
Discharge Location Discharge Placement: Skilled nursing facility

## 2020-02-03 NOTE — CONSULTS
PM&R Rehab Consult Subjective:  
 
Date of Consultation:  February 3, 2020 Referring Physician:Dr Bert Floyd Patient is a 80 y.o. 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 
  TIA (transient ischemic attack) (1/31/2020) Active Problems: 
  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 Initial head CT and MRI were negative for acute changes. F/u MRI on 2/1 with results showing a left pontine infarct and a remote right frontal parietal cortical and subcortical ischemic insult with associated encephalomalacia. CTA showed a \"60-70%\" left ICA stenosis per radiology report. He was seen in consult by Vascular surgeon, Dr Katerin Piedra. At the time, f/u MRI was pending so results were needed to determine if surgery would even help this gentleman.  per f/u by Dr Stacy Trinh, given age and DNR status as well as pontine stroke, he was not an operative candidate.  
 MBS this a. m recommends a pureed diet with HTL. Seen by therapy with current level of function; bed mobility and transfers - max A, poor sitting balance, dysphagia - on pureed/NTL diet. PE: L facial droop, drooling, follows commands, oriented, cooperative, L UE 3-/5, L wrist/hand flexion contracture, able to extend L elbow, L LE - 3-/5, no ankle clonus. PLOF: lives with spouse, household ambulation with quad cane, spouse assisted with ADLs, had home health aide 3x/week. Did not drive.  Wife reports that her husbands cognition was not good. His endurance was poor. He could not be left alone. He has a quad cane but doesn't like to use it. She reports many 'near' falls. Past Medical History:  
Diagnosis Date  Arrhythmia   
 reason for current procedure  Arthritis   
 osteo  CAD (coronary artery disease) 1999 CABG no stents  Carotid artery stenosis without cerebral infarction 2016  Chronic pain   
 arthritic  Claudication ([facility]  Coronary atherosclerosis of native coronary artery 2016  GERD (gastroesophageal reflux disease)   
 controlled with meds  Hyperlipidemia  Hypertension   
 controlled with meds  Kidney stones   
 hx of  
 MI (myocardial infarction) ([address] 1999  PAF (paroxysmal atrial fibrillation) ([facility]  Stroke ([facility]  Thromboembolus ([facility] Family History Problem Relation Age of Onset  Coronary Artery Disease Other   
     family history Social History Tobacco Use  Smoking status: Former Smoker Packs/day: 1.50 Years: 35.00 Pack years: 52.50 Last attempt to quit: 10/1/1986 Years since quittin.3  Smokeless tobacco: Never Used  Tobacco comment: quit  Substance Use Topics  Alcohol use: Yes Alcohol/week: 6.7 standard drinks Types: 7 Glasses of wine, 1 Shots of liquor per week Comment: minimal  
 
Past Surgical History:  
Procedure Laterality Date   [place] UNLISTED  ? hernia repair  CABG, ARTERY-VEIN, THREE  1999 CABG no stents  HX APPENDECTOMY  age 15  
 HX HEENT  12's?  
 left cataract  HX PTCA Prior to Admission medications Medication Sig Start Date End Date Taking? Authorizing Provider  
amLODIPine (NORVASC) 5 mg tablet Take 1 Tab by mouth daily. 19  Yes Elías Logan MD  
apixaban (ELIQUIS) 2.5 mg tablet Take 1 Tab by mouth two (2) times a day.  19  Yes Elías Logan MD  
 atorvastatin (LIPITOR) 80 mg tablet Take 0.5 Tabs by mouth daily. 19  Yes Sanjuana Hahn MD  
lisinopril (PRINIVIL, ZESTRIL) 40 mg tablet Take 1 Tab by mouth daily. 19  Yes Sanjuana Hahn MD  
allopurinol (ZYLOPRIM) 100 mg tablet Take 100 mg by mouth daily. Yes Provider, Historical  
aspirin 81 mg chewable tablet Take 81 mg by mouth daily. Yes Provider, Historical  
nitroglycerin (NITROSTAT) 0.4 mg SL tablet 1 Tab by SubLINGual route every five (5) minutes as needed for Chest Pain. 10/6/15  Yes Yogesh Farris PA  
escitalopram oxalate (LEXAPRO) 10 mg tablet Take 20 mg by mouth daily. Provider, Historical  
 
No Known Allergies Review of Systems:  Review of systems not obtained due to patient factors. Objective:  
 
Vitals: 
Blood pressure 149/61, pulse 62, temperature 98 °F (36.7 °C), resp. rate 18, SpO2 91 %. Temp (24hrs), Av.1 °F (36.7 °C), Min:97.7 °F (36.5 °C), Max:98.4 °F (36.9 °C) Intake and Output: 
No intake/output data recorded. Physical Exam: 
General:  Alert, oriented and mood affect appropriate Lungs:   Clear to auscultation bilaterally. Heart:  Regular rate and rhythm, S1, S2 stable, no murmur, click, rub or gallop. Abdomen:   Soft, non-tender. Bowel sounds present. No masses,  No organomegaly. Genitourinary: defer Neuro Muscular: LUE with inc tone, no pathological reflexes. Toes are down 
fcs well. Sensation intact. No dysmetria Skin:  No rashes, lesions, or signs/symptoms or infection, bandage to right great toe. Labs/Studies: 
Recent Results (from the past 72 hour(s)) CBC WITH AUTOMATED DIFF Collection Time: 20  3:44 PM  
Result Value Ref Range WBC 6.7 4.3 - 11.1 K/uL  
 RBC 4.30 4.23 - 5.6 M/uL  
 HGB 13.8 13.6 - 17.2 g/dL HCT 42.4 41.1 - 50.3 % MCV 98.6 (H) 79.6 - 97.8 FL  
 MCH 32.1 26.1 - 32.9 PG  
 MCHC 32.5 31.4 - 35.0 g/dL  
 RDW 13.5 11.9 - 14.6 % PLATELET 562 740 - 616 K/uL MPV 12.5 (H) 9.4 - 12.3 FL ABSOLUTE NRBC 0.00 0.0 - 0.2 K/uL  
 DF AUTOMATED NEUTROPHILS 64 43 - 78 % LYMPHOCYTES 24 13 - 44 % MONOCYTES 9 4.0 - 12.0 % EOSINOPHILS 2 0.5 - 7.8 % BASOPHILS 1 0.0 - 2.0 % IMMATURE GRANULOCYTES 0 0.0 - 5.0 %  
 ABS. NEUTROPHILS 4.2 1.7 - 8.2 K/UL  
 ABS. LYMPHOCYTES 1.6 0.5 - 4.6 K/UL  
 ABS. MONOCYTES 0.6 0.1 - 1.3 K/UL  
 ABS. EOSINOPHILS 0.2 0.0 - 0.8 K/UL  
 ABS. BASOPHILS 0.0 0.0 - 0.2 K/UL  
 ABS. IMM. GRANS. 0.0 0.0 - 0.5 K/UL METABOLIC PANEL, COMPREHENSIVE Collection Time: 01/31/20  3:44 PM  
Result Value Ref Range Sodium 143 136 - 145 mmol/L Potassium 4.7 3.5 - 5.1 mmol/L Chloride 108 (H) 98 - 107 mmol/L  
 CO2 29 21 - 32 mmol/L Anion gap 6 (L) 7 - 16 mmol/L Glucose 150 (H) 65 - 100 mg/dL BUN 40 (H) 8 - 23 MG/DL Creatinine 1.43 0.8 - 1.5 MG/DL  
 GFR est AA >60 >60 ml/min/1.73m2 GFR est non-AA 50 (L) >60 ml/min/1.73m2 Calcium 9.5 8.3 - 10.4 MG/DL Bilirubin, total 0.3 0.2 - 1.1 MG/DL  
 ALT (SGPT) 29 12 - 65 U/L  
 AST (SGOT) 26 15 - 37 U/L Alk. phosphatase 81 50 - 136 U/L Protein, total 6.6 6.3 - 8.2 g/dL Albumin 3.4 3.2 - 4.6 g/dL Globulin 3.2 2.3 - 3.5 g/dL A-G Ratio 1.1 (L) 1.2 - 3.5 PROTHROMBIN TIME + INR Collection Time: 01/31/20  3:44 PM  
Result Value Ref Range Prothrombin time 14.2 11.7 - 14.5 sec INR 1.1 GLUCOSE, POC Collection Time: 01/31/20  3:46 PM  
Result Value Ref Range Glucose (POC) 125 (H) 65 - 100 mg/dL EKG, 12 LEAD, INITIAL Collection Time: 01/31/20  4:12 PM  
Result Value Ref Range Ventricular Rate 62 BPM  
 Atrial Rate 127 BPM  
 QRS Duration 96 ms  
 Q-T Interval 420 ms QTC Calculation (Bezet) 426 ms Calculated R Axis 99 degrees Calculated T Axis 71 degrees Diagnosis Atrial fibrillation Septal infarct (cited on or before 07-NOV-2015) Abnormal ECG Confirmed by Shane Coleman ([id number]) on 2/1/2020 7:19:28 AM 
  
LIPID PANEL  
 Collection Time: 02/01/20  5:33 AM  
Result Value Ref Range LIPID PROFILE Cholesterol, total 143 <200 MG/DL Triglyceride 106 35 - 150 MG/DL  
 HDL Cholesterol 53 40 - 60 MG/DL  
 LDL, calculated 68.8 <100 MG/DL VLDL, calculated 21.2 6.0 - 23.0 MG/DL  
 CHOL/HDL Ratio 2.7 HEMOGLOBIN A1C WITH EAG Collection Time: 02/01/20  5:33 AM  
Result Value Ref Range Hemoglobin A1c 6.5 (H) 4.8 - 6.0 % Est. average glucose 140 mg/dL CBC W/O DIFF Collection Time: 02/01/20  5:33 AM  
Result Value Ref Range WBC 7.9 4.3 - 11.1 K/uL  
 RBC 4.08 (L) 4.23 - 5.6 M/uL  
 HGB 13.0 (L) 13.6 - 17.2 g/dL HCT 39.7 (L) 41.1 - 50.3 % MCV 97.3 79.6 - 97.8 FL  
 MCH 31.9 26.1 - 32.9 PG  
 MCHC 32.7 31.4 - 35.0 g/dL  
 RDW 13.5 11.9 - 14.6 % PLATELET 065 741 - 565 K/uL MPV 12.4 (H) 9.4 - 12.3 FL ABSOLUTE NRBC 0.00 0.0 - 0.2 K/uL PLEASE READ & DOCUMENT PPD TEST IN 24 HRS Collection Time: 02/01/20 10:58 PM  
Result Value Ref Range PPD Negative Negative  
 mm Induration 0 0 - 5 mm CBC WITH AUTOMATED DIFF Collection Time: 02/02/20  5:39 AM  
Result Value Ref Range WBC 6.5 4.3 - 11.1 K/uL  
 RBC 4.16 (L) 4.23 - 5.6 M/uL  
 HGB 13.5 (L) 13.6 - 17.2 g/dL HCT 40.3 (L) 41.1 - 50.3 % MCV 96.9 79.6 - 97.8 FL  
 MCH 32.5 26.1 - 32.9 PG  
 MCHC 33.5 31.4 - 35.0 g/dL  
 RDW 13.6 11.9 - 14.6 % PLATELET 872 923 - 505 K/uL MPV 11.6 9.4 - 12.3 FL ABSOLUTE NRBC 0.00 0.0 - 0.2 K/uL  
 DF AUTOMATED NEUTROPHILS 63 43 - 78 % LYMPHOCYTES 23 13 - 44 % MONOCYTES 10 4.0 - 12.0 % EOSINOPHILS 3 0.5 - 7.8 % BASOPHILS 1 0.0 - 2.0 % IMMATURE GRANULOCYTES 0 0.0 - 5.0 %  
 ABS. NEUTROPHILS 4.0 1.7 - 8.2 K/UL  
 ABS. LYMPHOCYTES 1.5 0.5 - 4.6 K/UL  
 ABS. MONOCYTES 0.7 0.1 - 1.3 K/UL  
 ABS. EOSINOPHILS 0.2 0.0 - 0.8 K/UL  
 ABS. BASOPHILS 0.0 0.0 - 0.2 K/UL  
 ABS. IMM. GRANS. 0.0 0.0 - 0.5 K/UL METABOLIC PANEL, BASIC  Collection Time: 02/02/20  5:39 AM  
 Result Value Ref Range Sodium 146 (H) 136 - 145 mmol/L Potassium 4.0 3.5 - 5.1 mmol/L Chloride 112 (H) 98 - 107 mmol/L  
 CO2 27 21 - 32 mmol/L Anion gap 7 7 - 16 mmol/L Glucose 92 65 - 100 mg/dL BUN 24 (H) 8 - 23 MG/DL Creatinine 1.13 0.8 - 1.5 MG/DL  
 GFR est AA >60 >60 ml/min/1.73m2 GFR est non-AA >60 >60 ml/min/1.73m2 Calcium 9.2 8.3 - 10.4 MG/DL  
CBC WITH AUTOMATED DIFF Collection Time: 02/03/20  5:40 AM  
Result Value Ref Range WBC 6.0 4.3 - 11.1 K/uL  
 RBC 4.04 (L) 4.23 - 5.6 M/uL  
 HGB 13.2 (L) 13.6 - 17.2 g/dL HCT 39.5 (L) 41.1 - 50.3 % MCV 97.8 79.6 - 97.8 FL  
 MCH 32.7 26.1 - 32.9 PG  
 MCHC 33.4 31.4 - 35.0 g/dL  
 RDW 13.6 11.9 - 14.6 % PLATELET 078 834 - 123 K/uL MPV 11.5 9.4 - 12.3 FL ABSOLUTE NRBC 0.00 0.0 - 0.2 K/uL  
 DF AUTOMATED NEUTROPHILS 68 43 - 78 % LYMPHOCYTES 16 13 - 44 % MONOCYTES 10 4.0 - 12.0 % EOSINOPHILS 4 0.5 - 7.8 % BASOPHILS 1 0.0 - 2.0 % IMMATURE GRANULOCYTES 0 0.0 - 5.0 %  
 ABS. NEUTROPHILS 4.1 1.7 - 8.2 K/UL  
 ABS. LYMPHOCYTES 1.0 0.5 - 4.6 K/UL  
 ABS. MONOCYTES 0.6 0.1 - 1.3 K/UL  
 ABS. EOSINOPHILS 0.3 0.0 - 0.8 K/UL  
 ABS. BASOPHILS 0.0 0.0 - 0.2 K/UL  
 ABS. IMM. GRANS. 0.0 0.0 - 0.5 K/UL METABOLIC PANEL, BASIC Collection Time: 02/03/20  5:40 AM  
Result Value Ref Range Sodium 148 (H) 136 - 145 mmol/L Potassium 3.8 3.5 - 5.1 mmol/L Chloride 114 (H) 98 - 107 mmol/L  
 CO2 26 21 - 32 mmol/L Anion gap 8 7 - 16 mmol/L Glucose 120 (H) 65 - 100 mg/dL BUN 22 8 - 23 MG/DL Creatinine 1.12 0.8 - 1.5 MG/DL  
 GFR est AA >60 >60 ml/min/1.73m2 GFR est non-AA >60 >60 ml/min/1.73m2 Calcium 8.7 8.3 - 10.4 MG/DL Speech Assessment: 
 
Dysphagia Screening Vocal Quality/Secretions: (!) Abnormal 
History of Dysphagia: No 
O2 Saturation: Normal 
Alertness: Normal 
Pre-Swallow Assessment Score: 0 Ambulation: Activity and Safety Activity Level: Up with Assistance Ambulate: No (Comment) Activity: In bed, Family/Visitors present Activity Assistance: Partial (one person) Weight Bearing Status: WBAT (Weight Bearing as Tolerated) Mode of Transportation: Stretcher, IV equipment Repositioned: Head of bed elevated (degrees) Patient Turned: Supine, Turns self Head of Bed Elevated: Self regulated Activity Response: Fairly tolerated Assistive Device: Fall prevention device Safety Measures: Bed/Chair alarm on, Bed in low position, Bed/Chair-Wheels locked, Call light within reach, Fall prevention (comment), Gripper socks, Side rails X2 Impression/Plan:  
 
Principal Problem: 
  TIA (transient ischemic attack) (1/31/2020) Active Problems: 
  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 
Coordination of rehab/medical care Counseling of PM & R care issues management Monitoring and management of medical conditions per plan of care/orders  
-discussed with wife in length. She feels that [facility] would be to aggressive for her . She reports that he was requiring assistance for ADLS, had cognitive issues that also led to her never leaviing him home alone. He has further declined over the last 2 1/2 yrs since his first stroke. He is currently mod assist of two for bed mobility and STS. Given this information, I agree that pt would be best served in a SNF. She has considered long term placement in the past but has been resistant because he would be aware of what was happening and she would feel guilty for this. 
-case mgt aware of pt/wife choice 
-call if improvements made and wife changes her thought processing.  I suspect that he may require more assistance than previously at home. The caregiver who comes three days a week is thru the [address] [place] [address] and [facility] hi intensity.  
-long term BP goal , 130/80 Discussion with Family/Caregiver/Staff Reviewed Therapies/Labs/Meds/Records Audelia Mcgarry MD

## 2020-02-03 NOTE — INTERDISCIPLINARY ROUNDS
Interdisciplinary team rounds were held 2/3/2020 with the following team members:  Care Management, Nursing, Physical Therapy, Nurse Pracitioner and . Plan of care discussed. See clinical pathway and/or care plan for interventions and desired outcomes.

## 2020-02-03 NOTE — PROGRESS NOTES
Problem: Dysphagia (Adult) Goal: *Speech Goal: (INSERT TEXT) Description LTG: Patient will tolerate least restrictive diet without overt signs or symptoms of airway compromise by discharge. STG: Patient will tolerate pureed diet and  honey thick liquids without overt signs or symptoms of aspiration 100% of the time. DOWNGRADED 2/3/20. STG: Patient will utilize compensatory strategies of slow rate and lingual sweep with min cues to improve swallow safety. STG: Patient will perform oral motor and laryngeal strengthening exercises x10 each with 80% accuracy to improve strength and coordination of swallowing function. STG: Patient will participate in modified barium swallow study as clinically indicated. MET 2/3/20. STG: Patient will participate in chewable trials for potential diet advancement. Outcome: Progressing Towards Goal 
 
 
Speech language pathology: modified barium swallow study: Initial Assessment NAME/AGE/GENDER: Jose E Cee is a 80 y.o. male DATE: 2/3/2020 PRIMARY DIAGNOSIS: TIA (transient ischemic attack) [G45.9] [facility] [I63.9] ICD-10: Treatment Diagnosis: R13.12 Oropharyngeal dysphagia INTERDISCIPLINARY COLLABORATION: Registered Nurse and Radiologist 
PRECAUTIONS/ALLERGIES: Patient has no known allergies. ASSESSMENT/PLAN OF CARE:Based on the objective data described below, Mr. Michelle Mcgarry presents with moderate oropharyngeal dysphagia. Patient presented with thin, nectar, honey, pudding, mixed, and solid consistency trials for assessment. Oral phase of swallow remarkable reduced labial seal and decreased mastication and lingual strength. Labial weakness resulted in mild anterior spillage of liquids. Reduced mastication and lingual strength resulted in significantly prolonged oral prep time with chewables.  Pharyngeal phase of swallow characterized by delayed swallow initiation and reduced sensation, which resulted in the following: silent penetration to the cord before the swallow with thin by teaspoon/cup with trace residuals remaining in laryngeal vestibule post swallow. Penetration before the swallow with trace residuals remaining in laryngeal vestibule with nectar by straw. On first trial small sip nectar by cup patient with no penetration or aspiration; however, when given nectar by cup on 2nd trial, patient with penetration to the cords before the swallow and  trace SILENT aspiration during swallow. Cued cough was effective in clearing tracheal aspiration. No penetration or aspiration with honey by cup, pudding, or mixed consistency. Patient unable to consume honey by straw. Expelled solid consistency trial. Adequate tongue base retraction, epiglottic inversion, and pharyngeal stripping wave resulted in adequate pharyngeal clearance with all PO. Recommend puree consistency diet and honey thick liquids. No straws. Medications whole or crushed in puree pending patient preference. Will continue to follow for diet tolerance, potential diet tolerance, and oral motor/laryngeal strengthening exercises. RN notified of recommendations Patient was noted to have coughing episodes x2 during assessment that were not related to penetration/aspiration. ?????? ? ? This section established at most recent assessment?????????? 
RECOMMENDATIONS AND PLANNED INTERVENTIONS (Benefits and precautions of therapy have been discussed with the patient.): 
· PO:  Pureed · Liquids:  honey- no straws MEDICATIONS: 
· Whole or crushed in puree COMPENSATORY STRATEGIES/MODIFICATIONS INCLUDING: 
· Cup/sip · Small sips and bites · Slow rate OTHER RECOMMENDATIONS (including follow up treatment recommendations):  
· Oral motor exercises · Family training/education · Laryngeal exercises · Patient education FREQUENCY/DURATION: Continue to follow patient 3 times a week for duration of hospital stay to address above goals. RECOMMENDED REHABILITATION/EQUIPMENT: (at time of discharge pending progress): Due to the probability of continued deficits (see above) this patient will likely need continued skilled speech therapy after discharge. SUBJECTIVE:  
\"I hope my swallowing is ok. \" -Reports some coughing with nectar thick liquids. History of Present Injury/Illness: Mr. Maira Gonzalez  has a past medical history of Arrhythmia, Arthritis, CAD (coronary artery disease) (12/1999), Carotid artery stenosis without cerebral infarction (1/18/2016), Chronic pain, Claudication ([facility] Coronary atherosclerosis of native coronary artery (1/18/2016), GERD (gastroesophageal reflux disease), Hyperlipidemia, Hypertension, Kidney stones, MI (myocardial infarction) ([facility] (12/1999), PAF (paroxysmal atrial fibrillation) ([facility] Stroke ([facility] and Thromboembolus ([facility] .  He also  has a past surgical history that includes pr abdomen surgery proc unlisted (2000?); hx appendectomy (age 15); hx heent (1's?); hx ptca; and pr cabg, artery-vein, three (12/1999). Present Symptoms:   
Pain Intensity 1: 0 Current Dietary Status:  Janet/nectar Radiologist: Dr. Ayse Clement Social History/Home Situation:   
Home Environment: Private residence One/Two Story Residence: One story Living Alone: No 
Support Systems: Spouse/Significant Other/Partner Patient Expects to be Discharged to[de-identified] Rehabilitation facility Current DME Used/Available at Home: Cane, quad, Grab bars, Shower chair, Tub transfer bench, Wheelchair Tub or Shower Type: Shower OBJECTIVE:  
 
 
Cognitive/Communication Status:  Mental Status Neurologic State: Alert Orientation Level: Oriented X4 Cognition: Follows commands Perception: Appears intact Perseveration: No perseveration noted Safety/Judgement: Not assessed Oral Assessment:  Oral Assessment Labial: Decreased seal 
Dentition: Natural 
Oral Hygiene: Adequate Lingual: Decreased strength Vocal Quality:Low volume Patient Viewed: Patient Position: Upright in chair Film Views: Lateral, Fluoro Oral Prepatory: 
The patient was given the following: Consistency Presented: Pudding, Honey thick liquid, Mixed consistency, Nectar thick liquid, Solid, Thin liquid How Presented: SLP-fed/presented, Cup/sip, Spoon, Straw Oral Phase: 
Bolus Acceptance: No impairment Bolus Formation/Control: Impaired Propulsion: Delayed (# of seconds) Type of Impairment: Premature spillage, Mastication, Delayed Oral Residue: None Initiation of Swallow: Triggered at pyriform sinus(es), Triggered at vallecula Oral Phase Severity: Mild-moderate Pharyngeal Phase: 
Timing: Vallecular, Pyriform sinus Decreased Tongue Base Retraction?: No 
Laryngeal Elevation: Incomplete laryngeal closure Penetration: Before swallow, To cords Aspiration/Timing: Silent , After Aspiration/Penetration Score: 6 (Aspiration-Contrast passes below the cords/glottis, and is cleared) Pharyngeal Symmetry: Not assessed Pharyngeal Dysfunction: None Pharyngeal Phase Severity: Moderate Pharyngeal-Esophageal Segment: No impairment Assessment/Reassessment only, no treatment provided today Tool Used: Dysphagia Outcome and Severity Scale (JOSÉ) Score Comments Normal Diet  [] 7 With no strategies or extra time needed Functional Swallow  [] 6 May have mild oral or pharyngeal delay Mild Dysphagia 
  [] 5 Which may require one diet consistency restricted (those who demonstrate penetration which is entirely cleared on MBS would be included) Mild-Moderate Dysphagia  [] 4 With 1-2 diet consistencies restricted Moderate Dysphagia  [x] 3 With 2 or more diet consistencies restricted Moderately Severe Dysphagia  [] 2 With partial PO strategies (trials with ST only) Severe Dysphagia  [] 1 With inability to tolerate any PO safely Score:  Initial: 3 Most Recent: X (Date: -- ) Interpretation of Tool: The Dysphagia Outcome and Severity Scale (JOSÉ) is a simple, easy-to-use, 7-point scale developed to systematically rate the functional severity of dysphagia based on objective assessment and make recommendations for diet level, independence level, and type of nutrition. Score 7 6 5 4 3 2 1 Modifier CH CI CJ CK CL CM CN Payor: [facility] MEDICARE / Plan: [place] [employer] [facility] [place] / Product Type: Managed Care Medicare /  
__________________________________________________________________________________________________ Safety: After treatment position/precautions: · Side rails x 2 
· in holding bay with staff members present · Upright in Bed Recommendations for treatment: diet tolerance, potential diet advancement, and oral motor/laryngeal strengthening exercise Total Treatment Duration: 
Time In: 9148 Time Out: 1045 Saurabh Newell MS, CCC-SLP

## 2020-02-03 NOTE — PROGRESS NOTES
Neurology Daily Progress Note Assessment:  
 
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 Hx. A.fib on Eliquis and aspirin. The patient is currently on maximal medical therapy. TTE pending Plan:  
 
· Continue ASA · Continue Eliquis · Continue high intensity statin · Neurochecks Q4H · Telemetry · TTE pending  
· PT/OT/ST 
· BP management - normotensive, with long-term goal <130/80 · Smoking cessation if applicable · Diabetes education if applicable · Depression Screening prior to discharge Subjective: Interval history: 
 
Patient doing well. Dysphagia improved. Continues to have dysarthria. MRI demonstrates left pontine infarct. TTE pending. History: 
 
Zhao Finley is a 80 y.o. male who is being seen for Code S. Review of systems negative with exception of pertinent positives and negatives noted above. Objective:  
 
Vitals:  
 02/03/20 0000 02/03/20 0400 02/03/20 0800 02/03/20 1200 BP: 159/71 167/65 149/61 166/61 Pulse: 67 (!) 58 62 61 Resp: 18 18 18 20 Temp: 98.4 °F (36.9 °C) 97.7 °F (36.5 °C) 98 °F (36.7 °C) 97.5 °F (36.4 °C) SpO2: 92% 92% 91% 93% Current Facility-Administered Medications:  
  influenza vaccine 2019-20 (6 mos+)(PF) (FLUARIX/FLULAVAL/FLUZONE QUAD) injection 0.5 mL, 0.5 mL, IntraMUSCular, PRIOR TO DISCHARGE, Rox Floyd MD 
  dextrose 5% and 0.9% NaCl infusion, 75 mL/hr, IntraVENous, CONTINUOUS, Everardo Mansfield NP, Last Rate: 75 mL/hr at 02/02/20 2122, 75 mL/hr at 02/02/20 2122   atorvastatin (LIPITOR) tablet 80 mg, 80 mg, Oral, DAILY, Everardo Mansfield NP, Stopped at 02/02/20 0900 
  amLODIPine (NORVASC) tablet 5 mg, 5 mg, Oral, DAILY, Bebo BELLAMY NP, 5 mg at 02/03/20 0830 
  apixaban (ELIQUIS) tablet 2.5 mg, 2.5 mg, Oral, BID, Bebo BELLAMY, ISABELLE, 2.5 mg at 02/03/20 0830   aspirin chewable tablet 81 mg, 81 mg, Oral, DAILY, Alonzo Crane, NP, 81 mg at 02/03/20 0830   escitalopram oxalate (LEXAPRO) tablet 20 mg, 20 mg, Oral, DAILY, Dominga Camp B, NP, 20 mg at 02/03/20 0830 
  lisinopril (PRINIVIL, ZESTRIL) tablet 40 mg, 40 mg, Oral, DAILY, Dominga Camp B, NP, 40 mg at 02/03/20 0830 
  sodium chloride (NS) flush 5-40 mL, 5-40 mL, IntraVENous, Q8H, Dominga Camp B, NP, 5 mL at 02/03/20 0530 
  sodium chloride (NS) flush 5-40 mL, 5-40 mL, IntraVENous, PRN, Yandela Era, NP 
  acetaminophen (TYLENOL) tablet 650 mg, 650 mg, Oral, Q4H PRN, Reda Wichita, NP 
  metoprolol (LOPRESSOR) injection 1.25 mg, 1.25 mg, IntraVENous, Q4H PRN, Kelley Hollingsworth MD 
 
Recent Results (from the past 12 hour(s)) CBC WITH AUTOMATED DIFF Collection Time: 02/03/20  5:40 AM  
Result Value Ref Range WBC 6.0 4.3 - 11.1 K/uL  
 RBC 4.04 (L) 4.23 - 5.6 M/uL  
 HGB 13.2 (L) 13.6 - 17.2 g/dL HCT 39.5 (L) 41.1 - 50.3 % MCV 97.8 79.6 - 97.8 FL  
 MCH 32.7 26.1 - 32.9 PG  
 MCHC 33.4 31.4 - 35.0 g/dL  
 RDW 13.6 11.9 - 14.6 % PLATELET 826 898 - 666 K/uL MPV 11.5 9.4 - 12.3 FL ABSOLUTE NRBC 0.00 0.0 - 0.2 K/uL  
 DF AUTOMATED NEUTROPHILS 68 43 - 78 % LYMPHOCYTES 16 13 - 44 % MONOCYTES 10 4.0 - 12.0 % EOSINOPHILS 4 0.5 - 7.8 % BASOPHILS 1 0.0 - 2.0 % IMMATURE GRANULOCYTES 0 0.0 - 5.0 %  
 ABS. NEUTROPHILS 4.1 1.7 - 8.2 K/UL  
 ABS. LYMPHOCYTES 1.0 0.5 - 4.6 K/UL  
 ABS. MONOCYTES 0.6 0.1 - 1.3 K/UL  
 ABS. EOSINOPHILS 0.3 0.0 - 0.8 K/UL  
 ABS. BASOPHILS 0.0 0.0 - 0.2 K/UL  
 ABS. IMM. GRANS. 0.0 0.0 - 0.5 K/UL METABOLIC PANEL, BASIC Collection Time: 02/03/20  5:40 AM  
Result Value Ref Range Sodium 148 (H) 136 - 145 mmol/L Potassium 3.8 3.5 - 5.1 mmol/L Chloride 114 (H) 98 - 107 mmol/L  
 CO2 26 21 - 32 mmol/L Anion gap 8 7 - 16 mmol/L Glucose 120 (H) 65 - 100 mg/dL BUN 22 8 - 23 MG/DL Creatinine 1.12 0.8 - 1.5 MG/DL  
 GFR est AA >60 >60 ml/min/1.73m2 GFR est non-AA >60 >60 ml/min/1.73m2 Calcium 8.7 8.3 - 10.4 MG/DL  
 
CT Results (most recent): 
Results from [facility] Encounter encounter on 01/31/20 CTA HEAD NECK W WO CONT Narrative Title:  CT arteriogram of the neck and head. Indication: TIA (transient ischemic attack). Technique: Axial images of the neck and head were obtained after the uneventful  
administration of intravenous iodinated contrast media. Contrast was used to 
best identify the arterial structures. Images were reviewed on a separate, free 
standing, three-dimensional workstation as per the referring physicians request. 
  
 
All stenosis percentages derived by comparing the narrowest segment with the 
distal Internal Carotid Artery luminal diameter, as described in the [place] Symptomatic Carotid Endarterectomy Trial (NASCET) criteria. All CT scans at this facility are performed using dose reduction/dose modulation 
techniques, as appropriate the performed exam, including the following: Automated Exposure Control; Adjustment of the mA and/or kV according to patient 
size (this includes techniques or standardized protocols for targeted exams 
where dose is matched to indication/reason for exam); and Use of Iterative Reconstruction Technique. Comparison: Ultrasound 9/14/2017. Loletta Nunnery Findings: 
 
Brain[de-identified] No midline shift or mass effect. No enhancing mass lesion. Low-density 
area in the right MCA territory as detailed on the recent CT head exam. 
Lungs:  No focal consolidation or pleural effusions. No suspicious pulmonary 
nodules. Loletta Nunnery Bones:  No osseous destruction. . Kyphosis in the upper thoracic spine. Moderate 
to advanced multilevel degenerative changes with degenerative disc disease 
present throughout the upper cervical spine. Paranasal sinuses:  Paranasal sinuses are clear. Loletta Nunnery Brain:  No midline shift or mass effect. No enhancing mass lesion. Soft tissues:  Partial opacification of the right maxillary sinus. Oris Gifford Dural venous sinuses:  Patent. Aortic arch: Moderate calcific atherosclerosis. The aorta is nonaneurysmal.. Right brachiocephalic artery:  No significant stenosis or occlusion. .  . Right subclavian artery:  No significant stenosis or occlusion. .  . Left subclavian artery:  No significant stenosis or occlusion. .  . Right common carotid artery:  No significant stenosis or occlusion. .  . Right external carotid artery:  No significant stenosis or occlusion. .  . Right internal carotid artery:  No significant stenosis or occlusion. Oris Gifford Atherosclerosis is present at the right ICA; however, there is no significant 
stenosis. Left common carotid artery: No significant stenosis or occlusion. .  . Left external carotid artery:  No significant stenosis or occlusion. .  . Left internal carotid artery:  There is a 60-70% stenosis at the origin of the 
left ICA. Oris Gifford Right vertebral artery:  No significant stenosis or occlusion. .. Left vertebral artery:  Mild stenosis at the origin of the left vertebral 
artery. Oris Gifford Basilar artery:  No significant stenosis, occlusion, or aneurysm. Oris Gifford Right middle cerebral artery:  Remote ischemic changes are present in the right 
MCA. No proximal right MCA cut off or occlusion. Right anterior cerebral artery:  No significant stenosis, occlusion, or 
aneurysm. Oris Gifford Anterior communicating artery: No significant stenosis, occlusion, or aneurysm. Oris Gifford Left middle cerebral artery:  No significant stenosis, occlusion, or aneurysm. Oris Gifford Left anterior cerebral artery:  No significant stenosis, occlusion, or 
aneurysm. Oris Gifford Right posterior communicating artery: No significant stenosis, occlusion, or 
aneurysm. Oris Gifford Left posterior communicating artery:  No significant stenosis, occlusion, or 
aneurysm. Oris Gifford Right posterior cerebral artery:  No significant stenosis, occlusion, or 
aneurysm. Oris Gifford Left posterior cerebral artery:  No significant stenosis, occlusion, or 
aneurysm. Ann  Impression Impression: 1. No acute arterial cutoff or occlusion. Moderate diffuse calcific 
atherosclerosis. 2.  There is a 60-70% percent stenosis at the origin of the left ICA. MRI Results (most recent): 
Results from Hospital Encounter encounter on 01/31/20 MRI BRAIN WO CONT Narrative Clinical History: The patient is a 80years year old Male presenting with 
symptoms of dysarthria, left facial droop, prior RMCA infarct Comparison:  Head CT 1/31/2020 Technique:  Axial T2, axial FLAIR, axial diffusion-weighted,  sagittal T1 and 
coronal gradient-echo scans were performed. Findings:  
 
Subacute ischemic changes are seen in the left omayra. There is otherwise chronic 
microvascular disease. Extensive remote ischemic changes are noted throughout the right frontal 
parietal convexity encephalomalacia and surrounding gliosis as well as laminar 
necrosis. Associated wallerian degeneration of the right cerebral peduncle is 
demonstrated. There are otherwise age-related senescent changes with sulcal and 
ventricular prominence. Mild chronic confluent periventricular white matter 
disease is seen. Throughout the corona radiata and centrum semiovale as well as 
basal ganglia. There are no abnormal extra-axial fluid collections. No evidence 
of mass or mass effect is seen. There is no diffusion signal abnormality. Expected flow voids are maintained in the major intracranial vessels. Mild cerebellar involutional changes are seen however Is no evidence of Chiari 
malformation. The ventricular system and CSF containing spaces are unremarkable in appearance. Visualized extracranial soft tissues are unremarkable. The paranasal sinuses are well pneumatized and aerated. Impression Impression: 1. Subacute left pontine ischemia 2. Remote right frontal parietal cortical and subcortical ischemic insult with 
associated encephalomalacia and surrounding gliosis. 3. Otherwise chronic microvascular disease. DC4 The significant findings in this report have been referred to the Imaging Navigator in order to communicate to the referring provider or his/her designee 
as outlined in Section II.C.2.a.ii-iii of the ACR Practice Guideline for Communication of Diagnostic Imaging Findings. CPT code(s) [id number] Physical Exam: 
General - Well developed, well nourished, in no apparent distress. Pleasant and conversent. HEENT - Normocephalic, atraumatic. Conjunctiva are clear. Neck - Supple without masses Cardiovascular - Regular rate and rhythm. Normal S1, S2 without murmurs, rubs, or gallops. Lungs - Clear to auscultation. Abdomen - Soft, nontender with normal bowel sounds. Extremities - Peripheral pulses intact. No edema and no rashes. Neurological examination - Comprehension, attention, memory and reasoning are intact. Language is normal and speech are dysarthric. On cranial nerve examination, (II, III, IV, VI) pupils are equal, round, and reactive to light. Left homonymous hemianopsia. Extraocular motility is normal. (V, VII) left facial droop and sensation is intact to light touch. (VIII) Hearing is intact. (IX, X) Palate and uvula elevate symmetrically. Voice is normal. (XI) Shoulder shrug is strong and equal bilaterally. (XII)Tongue is midline. Motor examination - There is increased muscle tone in LUE. Strength is 5/5 in RUE, BLE, 3/5 in LUE. Muscle stretch reflexes are normoactive and there are no pathological reflexes present. Plantar response is flexor. Sensation is intact to light touch, pinprick. Extinction on left. Cerebellar examination is normal finger/nose and heel/dumont. Signed By: Sadiq Clifford NP February 3, 2020

## 2020-02-03 NOTE — PROGRESS NOTES
CM met with patient and his spouse and new STR referrals sent to in network providers; Ramya, Viridiana Mcbride and Alexis Huizar. [facility] declined per Dr. Damien Brooks. Liaisons contacted regarding pending referrals.

## 2020-02-03 NOTE — PROGRESS NOTES
Problem: Falls - Risk of 
Goal: *Absence of Falls Description Document Christian Clark Fall Risk and appropriate interventions in the flowsheet. Outcome: Progressing Towards Goal 
Note: Fall Risk Interventions: 
Mobility Interventions: PT Consult for mobility concerns Mentation Interventions: Bed/chair exit alarm Medication Interventions: Bed/chair exit alarm, Patient to call before getting OOB Elimination Interventions: Call light in reach Problem: Patient Education: Go to Patient Education Activity Goal: Patient/Family Education Outcome: Progressing Towards Goal 
  
Problem: Diabetes Self-Management Goal: *Disease process and treatment process Description Define diabetes and identify own type of diabetes; list 3 options for treating diabetes. Outcome: Progressing Towards Goal 
Goal: *Incorporating nutritional management into lifestyle Description Describe effect of type, amount and timing of food on blood glucose; list 3 methods for planning meals. Outcome: Progressing Towards Goal 
Goal: *Incorporating physical activity into lifestyle Description State effect of exercise on blood glucose levels. Outcome: Progressing Towards Goal 
Goal: *Developing strategies to promote health/change behavior Description Define the ABC's of diabetes; identify appropriate screenings, schedule and personal plan for screenings. Outcome: Progressing Towards Goal 
Goal: *Using medications safely Description State effect of diabetes medications on diabetes; name diabetes medication taking, action and side effects. Outcome: Progressing Towards Goal 
Goal: *Monitoring blood glucose, interpreting and using results Description Identify recommended blood glucose targets  and personal targets. Outcome: Progressing Towards Goal 
Goal: *Prevention, detection, treatment of acute complications Description List symptoms of hyper- and hypoglycemia; describe how to treat low blood sugar and actions for lowering  high blood glucose level. Outcome: Progressing Towards Goal 
Goal: *Prevention, detection and treatment of chronic complications Description Define the natural course of diabetes and describe the relationship of blood glucose levels to long term complications of diabetes. Outcome: Progressing Towards Goal 
Goal: *Developing strategies to address psychosocial issues Description Describe feelings about living with diabetes; identify support needed and support network Outcome: Progressing Towards Goal 
Goal: *Insulin pump training Outcome: Progressing Towards Goal 
Goal: *Sick day guidelines Outcome: Progressing Towards Goal 
Goal: *Patient Specific Goal (EDIT GOAL, INSERT TEXT) Outcome: Progressing Towards Goal 
  
Problem: Patient Education: Go to Patient Education Activity Goal: Patient/Family Education Outcome: Progressing Towards Goal 
  
Problem: Patient Education: Go to Patient Education Activity Goal: Patient/Family Education Outcome: Progressing Towards Goal 
  
Problem: TIA/CVA Stroke: Day 2 Until Discharge Goal: Off Pathway (Use only if patient is Off Pathway) Outcome: Progressing Towards Goal 
Goal: Activity/Safety Outcome: Progressing Towards Goal 
Goal: Diagnostic Test/Procedures Outcome: Progressing Towards Goal 
Goal: Nutrition/Diet Outcome: Progressing Towards Goal 
Goal: Discharge Planning Outcome: Progressing Towards Goal 
Goal: Medications Outcome: Progressing Towards Goal 
Goal: Respiratory Outcome: Progressing Towards Goal 
Goal: Treatments/Interventions/Procedures Outcome: Progressing Towards Goal 
Goal: Psychosocial 
Outcome: Progressing Towards Goal 
Goal: *Verbalizes anxiety and depression are reduced or absent Outcome: Progressing Towards Goal 
Goal: *Absence of aspiration Outcome: Progressing Towards Goal 
Goal: *Absence of deep venous thrombosis signs and symptoms(Stroke Metric) Outcome: Progressing Towards Goal 
 Goal: *Optimal pain control at patient's stated goal 
Outcome: Progressing Towards Goal 
Goal: *Tolerating diet Outcome: Progressing Towards Goal 
Goal: *Ability to perform ADLs and demonstrates progressive mobility and function Outcome: Progressing Towards Goal 
Goal: *Stroke education continued(Stroke Metric) Outcome: Progressing Towards Goal 
  
Problem: Ischemic Stroke: Discharge Outcomes Goal: *Verbalizes anxiety and depression are reduced or absent Outcome: Progressing Towards Goal 
Goal: *Verbalize understanding of risk factor modification(Stroke Metric) Outcome: Progressing Towards Goal 
Goal: *Hemodynamically stable Outcome: Progressing Towards Goal 
Goal: *Absence of aspiration pneumonia Outcome: Progressing Towards Goal 
Goal: *Aware of needed dietary changes Outcome: Progressing Towards Goal 
Goal: *Verbalize understanding of prescribed medications including anti-coagulants, anti-lipid, and/or anti-platelets(Stroke Metric) Outcome: Progressing Towards Goal 
Goal: *Tolerating diet Outcome: Progressing Towards Goal 
Goal: *Aware of follow-up diagnostics related to anticoagulants Outcome: Progressing Towards Goal 
Goal: *Ability to perform ADLs and demonstrates progressive mobility and function Outcome: Progressing Towards Goal 
Goal: *Absence of DVT(Stroke Metric) Outcome: Progressing Towards Goal 
Goal: *Absence of aspiration Outcome: Progressing Towards Goal 
Goal: *Optimal pain control at patient's stated goal 
Outcome: Progressing Towards Goal 
Goal: *Home safety concerns addressed Outcome: Progressing Towards Goal 
Goal: *Describes available resources and support systems Outcome: Progressing Towards Goal 
Goal: *Verbalizes understanding of activation of EMS(911) for stroke symptoms(Stroke Metric) Outcome: Progressing Towards Goal 
Goal: *Understands and describes signs and symptoms to report to providers(Stroke Metric) Outcome: Progressing Towards Goal 
 Goal: *Neurolgocially stable (absence of additional neurological deficits) Outcome: Progressing Towards Goal 
Goal: *Verbalizes importance of follow-up with primary care physician(Stroke Metric) Outcome: Progressing Towards Goal 
Goal: *Smoking cessation discussed,if applicable(Stroke Metric) Outcome: Progressing Towards Goal 
Goal: *Depression screening completed(Stroke Metric) Outcome: Progressing Towards Goal 
  
Problem: Pressure Injury - Risk of 
Goal: *Prevention of pressure injury Description Document Julian Scale and appropriate interventions in the flowsheet. Outcome: Progressing Towards Goal 
Note: Pressure Injury Interventions: 
Sensory Interventions: Assess changes in LOC Moisture Interventions: Absorbent underpads Activity Interventions: PT/OT evaluation, Pressure redistribution bed/mattress(bed type) Mobility Interventions: PT/OT evaluation, Pressure redistribution bed/mattress (bed type) Nutrition Interventions: Offer support with meals,snacks and hydration Friction and Shear Interventions: HOB 30 degrees or less Problem: Patient Education: Go to Patient Education Activity Goal: Patient/Family Education Outcome: Progressing Towards Goal 
  
Problem: Patient Education: Go to Patient Education Activity Goal: Patient/Family Education Outcome: Progressing Towards Goal 
  
Problem: Patient Education: Go to Patient Education Activity Goal: Patient/Family Education Outcome: Progressing Towards Goal 
  
Problem: Dysphagia (Adult) Goal: *Speech Goal: (INSERT TEXT) Description LTG: Patient will tolerate least restrictive diet without overt signs or symptoms of airway compromise by discharge. STG: Patient will tolerate pureed diet and nectar-thick liquids without overt signs or symptoms of aspiration 100% of the time. STG: Patient will utilize compensatory strategies of slow rate and lingual sweep with min cues to improve swallow safety. STG: Patient will perform oral motor and laryngeal strengthening exercises x10 each with 80% accuracy to improve strength and coordination of swallowing function. STG: Patient will participate in modified barium swallow study as clinically indicated. Outcome: Progressing Towards Goal 
  
Problem: Patient Education: Go to Patient Education Activity Goal: Patient/Family Education Outcome: Progressing Towards Goal

## 2020-02-03 NOTE — PROGRESS NOTES
02/03/20 0701 NIH Stroke Scale Interval Other (comment) 
(Dual NIH with JULIO C [place])  
LOC 0  
LOC Questions 0 LOC Commands 0 Best Gaze 0 Visual 0 Facial Palsy 1 Motor Right Arm 0 Motor Left Arm 1 Motor Right Leg 0 Motor Left Leg 0 Limb Ataxia 0 Sensory 0 Best Language 0 Dysarthria 1 Extinction and Inattention 0 Total 3

## 2020-02-03 NOTE — PROGRESS NOTES
Hospitalist Progress Note Admit Date:  2020  3:39 PM  
Name:  Zoltan Leach  
Age:  80 y.o. 
:  1934 MRN:  [id number] PCP:  Raymundo Henley MD 
Treatment Team: Attending Provider: Denita Mei MD; Nurse Practitioner: Julio C Winchester NP; Utilization Review: Rod Henderson RN; Consulting Provider: Sandra Suarez MD; Charge Nurse: Giana Guerrero Subjective: HPI and or CC: 
 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 2/3: 
Patient is alert and oriented x3. Slurred speech remains but improving. Spouse at bedside. Update provided. Neuro following, MRI shows1. Subacute left pontine ischemia 2. Remote right frontal parietal cortical and subcortical ischemic insult with 
associated encephalomalacia and surrounding gliosis. 3. Otherwise chronic microvascular disease. , echo, CTA head/neck 60-70% stenosis left ICA. ST eval done, patient able to have honey thick, mechanical soft diet. Vascular saw patient and medical management for stenosis in left ICA. CM assisting with SNF options. Objective:  
 
Patient Vitals for the past 24 hrs: 
 Temp Pulse Resp BP SpO2  
20 0800 98 °F (36.7 °C) 62 18 149/61 91 % 20 0400 97.7 °F (36.5 °C) (!) 58 18 167/65 92 % 20 0000 98.4 °F (36.9 °C) 67 18 159/71 92 % 20 2000 98.1 °F (36.7 °C) 61 18 156/68 93 % 20 1600 98.2 °F (36.8 °C) 60 19 146/79 90 % 20 1200 97.9 °F (36.6 °C) 60 19 128/76 91 % Oxygen Therapy O2 Sat (%): 91 % (20 0800) Pulse via Oximetry: 67 beats per minute (20 1829) No intake or output data in the 24 hours ending 20 1144 REVIEW OF SYSTEMS: Comprehensive ROS performed and negative except as stated in HPI. Physical Examination: General:       Alert, cooperative, no distress, appears stated age. Head:            Normocephalic, without obvious abnormality, atraumatic. Nose:            Nares normal. No drainage or sinus tenderness. Lungs:          CTA, resp even and nonlabored Heart:            S1S2 present without murmurs rubs gallops. Irregular. No LE edema. Abdomen:    Soft, non-tender. Not distended. Bowel sounds normal. No masses Extremities: No cyanosis. Significant residual LUE weakness from previous CVA. RUE strength 4/5, LUE strength 1/5. Skin:             Texture, turgor normal. No rashes or lesions. Not Jaundiced. Right great toe with bandage c/d/i Neurologic:  Alert and oriented X3. PERRLA. Left facial droop. Slurred speech.   
  
 
Data Review: 
I have reviewed all labs, meds, telemetry events, and studies from the last 24 hours. Recent Results (from the past 24 hour(s)) CBC WITH AUTOMATED DIFF Collection Time: 02/03/20  5:40 AM  
Result Value Ref Range WBC 6.0 4.3 - 11.1 K/uL  
 RBC 4.04 (L) 4.23 - 5.6 M/uL  
 HGB 13.2 (L) 13.6 - 17.2 g/dL HCT 39.5 (L) 41.1 - 50.3 % MCV 97.8 79.6 - 97.8 FL  
 MCH 32.7 26.1 - 32.9 PG  
 MCHC 33.4 31.4 - 35.0 g/dL  
 RDW 13.6 11.9 - 14.6 % PLATELET 345 887 - 030 K/uL MPV 11.5 9.4 - 12.3 FL ABSOLUTE NRBC 0.00 0.0 - 0.2 K/uL  
 DF AUTOMATED NEUTROPHILS 68 43 - 78 % LYMPHOCYTES 16 13 - 44 % MONOCYTES 10 4.0 - 12.0 % EOSINOPHILS 4 0.5 - 7.8 % BASOPHILS 1 0.0 - 2.0 % IMMATURE GRANULOCYTES 0 0.0 - 5.0 %  
 ABS. NEUTROPHILS 4.1 1.7 - 8.2 K/UL  
 ABS. LYMPHOCYTES 1.0 0.5 - 4.6 K/UL  
 ABS. MONOCYTES 0.6 0.1 - 1.3 K/UL  
 ABS. EOSINOPHILS 0.3 0.0 - 0.8 K/UL  
 ABS. BASOPHILS 0.0 0.0 - 0.2 K/UL  
 ABS. IMM. GRANS. 0.0 0.0 - 0.5 K/UL METABOLIC PANEL, BASIC Collection Time: 02/03/20  5:40 AM  
Result Value Ref Range Sodium 148 (H) 136 - 145 mmol/L Potassium 3.8 3.5 - 5.1 mmol/L  Chloride 114 (H) 98 - 107 mmol/L  
 CO2 26 21 - 32 mmol/L Anion gap 8 7 - 16 mmol/L Glucose 120 (H) 65 - 100 mg/dL BUN 22 8 - 23 MG/DL Creatinine 1.12 0.8 - 1.5 MG/DL  
 GFR est AA >60 >60 ml/min/1.73m2 GFR est non-AA >60 >60 ml/min/1.73m2 Calcium 8.7 8.3 - 10.4 MG/DL All Micro Results None Current Meds: 
Current Facility-Administered Medications Medication Dose Route Frequency  influenza vaccine 2019-20 (6 mos+)(PF) (FLUARIX/FLULAVAL/FLUZONE QUAD) injection 0.5 mL  0.5 mL IntraMUSCular PRIOR TO DISCHARGE  dextrose 5% and 0.9% NaCl infusion  75 mL/hr IntraVENous CONTINUOUS  
 atorvastatin (LIPITOR) tablet 80 mg  80 mg Oral DAILY  amLODIPine (NORVASC) tablet 5 mg  5 mg Oral DAILY  apixaban (ELIQUIS) tablet 2.5 mg  2.5 mg Oral BID  aspirin chewable tablet 81 mg  81 mg Oral DAILY  escitalopram oxalate (LEXAPRO) tablet 20 mg  20 mg Oral DAILY  lisinopril (PRINIVIL, ZESTRIL) tablet 40 mg  40 mg Oral DAILY  sodium chloride (NS) flush 5-40 mL  5-40 mL IntraVENous Q8H  
 sodium chloride (NS) flush 5-40 mL  5-40 mL IntraVENous PRN  
 acetaminophen (TYLENOL) tablet 650 mg  650 mg Oral Q4H PRN  
 metoprolol (LOPRESSOR) injection 1.25 mg  1.25 mg IntraVENous Q4H PRN Diet: DIET PUREED Other Studies (last 24 hours): Xr Swallow Func Video Result Date: 2/3/2020 Modified Barium Swallow INDICATION: Difficulty swallowing post CVA Fluoro time: 3.6 minutes Spot films:  0 Fluoroscopy was used to evaluate pharyngeal function while the patient was given barium solutions and barium coated solids. FINDINGS: Patient aspirated thin and nectar thick liquids. Honey thick was better tolerated. There is no significant residual.  
 
IMPRESSION: Aspiration of thin and nectar thick liquids. Assessment and Plan:  
 
Hospital Problems as of 2/3/2020 Never Reviewed Codes Class Noted - Resolved POA  
 CVA (cerebral vascular accident) ([facility] [address] ICD-10-CM: I63.9 ICD-9-CM: 434.91  2/2/2020 - Present Unknown * (Principal) TIA (transient ischemic attack) ICD-10-CM: G45.9 ICD-9-CM: 435.9  1/31/2020 - Present Unknown CAD (coronary artery disease) (Chronic) ICD-10-CM: I25.10 ICD-9-CM: 414.00  10/6/2015 - Present Yes Hypertension (Chronic) ICD-10-CM: I10 
ICD-9-CM: 401.9  10/6/2015 - Present Yes PAF (paroxysmal atrial fibrillation) (HCC) (Chronic) ICD-10-CM: I48.0 ICD-9-CM: 427.31  10/6/2015 - Present Yes Overview Signed 4/26/2017 12:59 PM by Vince Costello MD  
  [facility] Dyslipidemia (Chronic) ICD-10-CM: V93.3 ICD-9-CM: 272.4  10/6/2015 - Present Yes A/P:   
TIA r/o CVA Neurology following On eliquis and ASA, statin Swallowing screen prior to oral intake PT/OT, speech therapy MRI brain CTA head/neck without significant occlusion. Medical management for now, Vascular saw patient and agrees with this. Echo 55-60% EF. Allow permissive /120 X24 hours Check lipid panel, A1C-6.5, TSH, cardiac enzymes Neuro checks Q4 hours 
  
Afib On eliquis 
  
Right toe wound Consult wound care Followed by wound clinic outpatient Signed: 
JACKIE Dumont

## 2020-02-03 NOTE — PROGRESS NOTES
02/02/20 1848 NIH Stroke Scale Interval Other (comment) 
(Dual NIH with [place] RN) LOC 0  
LOC Questions 0 LOC Commands 0 Best Gaze 0 Visual 0 Facial Palsy 1 Motor Right Arm 0 Motor Left Arm 1 Motor Right Leg 0 Motor Left Leg 0 Limb Ataxia 0 Sensory 1 Best Language 0 Dysarthria 1 Extinction and Inattention 0 Total 4

## 2020-02-03 NOTE — PROGRESS NOTES
Patient seen and examined. Patient admitted for strokelike symptoms with dysarthria and facial droop. Patient work-up showed he had a left carotid stenosis greater than 70%. Long discussion with patient and family detail. Unsure the etiology of the symptoms. However would recommend physical therapy. Patient is 80years old and is a DNR most likely not a surgical candidate, recommend maximum platelet therapy. Baldo Sultana

## 2020-02-03 NOTE — PROCEDURES
Routine EEG Report Reason for EEG: Stroke-like symptoms Current Facility-Administered Medications:  
  influenza vaccine 2019-20 (6 mos+)(PF) (FLUARIX/FLULAVAL/FLUZONE QUAD) injection 0.5 mL, 0.5 mL, IntraMUSCular, PRIOR TO DISCHARGE, Adal Sanchez MD 
  dextrose 5% and 0.9% NaCl infusion, 75 mL/hr, IntraVENous, CONTINUOUS, Milena Yin NP, Last Rate: 75 mL/hr at 02/02/20 2122, 75 mL/hr at 02/02/20 2122   atorvastatin (LIPITOR) tablet 80 mg, 80 mg, Oral, DAILY, Milena Yin NP, Stopped at 02/02/20 0900 
  amLODIPine (NORVASC) tablet 5 mg, 5 mg, Oral, DAILY, Zack BELLAMY, NP, 5 mg at 02/03/20 0830 
  apixaban (ELIQUIS) tablet 2.5 mg, 2.5 mg, Oral, BID, Zack BELLAMY, NP, 2.5 mg at 02/03/20 0830   aspirin chewable tablet 81 mg, 81 mg, Oral, DAILY, Zack Vogt B, NP, 81 mg at 02/03/20 0830   escitalopram oxalate (LEXAPRO) tablet 20 mg, 20 mg, Oral, DAILY, Zack Vogt B, NP, 20 mg at 02/03/20 0830 
  lisinopril (PRINIVIL, ZESTRIL) tablet 40 mg, 40 mg, Oral, DAILY, Zack Vogt B, NP, 40 mg at 02/03/20 0830 
  sodium chloride (NS) flush 5-40 mL, 5-40 mL, IntraVENous, Q8H, Zack Vogt B, NP, 5 mL at 02/03/20 0530 
  sodium chloride (NS) flush 5-40 mL, 5-40 mL, IntraVENous, PRN, Trinda Height, NP 
  acetaminophen (TYLENOL) tablet 650 mg, 650 mg, Oral, Q4H PRN, Trinda Height, NP 
  metoprolol (LOPRESSOR) injection 1.25 mg, 1.25 mg, IntraVENous, Q4H PRN, Melonie Gaucher, MD 
 
 
Technical Summary: This EEG was performed with a 32-channel digital EEG machine with electrodes placed according to the international 10-20 system of placement. Background:  
During the maximal awake state a posterior dominant rhythm of 9 Hz was seen. Anterior background consisted of medium amplitude activity in the alpha range with occasional intermixed beta frequencies. Hyperventilation: Hyperventilation was not performed due to medical condition Photic Stimulation: Photic stimulation was not performed due to medical condition Sleep: None Impression: This EEG is normal in the awake-only. No interictal epileptiform discharges or lateralizing features were seen.

## 2020-02-03 NOTE — ADT AUTH CERT NOTES
Utilization Reviews  
 
   
LOC:Acute Adult-Stroke (2/3/2020) by Rose Mary Tolentino  
 
   
Review Status Review Entered In Primary 2/3/2020 16:23  
   
Criteria Review REVIEW SUMMARY 
  
Patient: Archana Tamayo Review Number: [id number] Review Status: In Primary 
  
Condition Specific: Yes 
  
Condition Level Of Care Code: ACUTE Condition Level Of Care Description: Acute 
  
  
OUTCOMES Outcome Type: Primary 
  
  
  
REVIEW DETAILS 
  
Service Date: 02/03/2020 Admit Date: 01/31/2020 Product: [facility] [place] Adult Subset: Stroke (Symptom or finding within 24h) 
  (Excludes PO medications unless noted) [X] Select Day, One: 
            [X] Episode Day 4-6, One: 
                [X] ACUTE, One: 
                    [X] Partial responder, not clinically stable for discharge and requires continued stay, >= One: 
                    ~--Admin, IQ Admin Admin on 02- 04:21 PM--~ Hospitalist Progress Note Patient is alert and oriented x3. Slurred speech remains but improving. Spouse at bedside. Update provided. Neuro following, MRI shows1. Subacute left pontine ischemia 2. Remote right frontal parietal cortical and subcortical ischemic insult with 
                    associated encephalomalacia and surrounding gliosis. 3. Otherwise chronic microvascular disease. , echo, CTA head/neck 60-70% stenosis left ICA. ST eval done, patient able to have honey thick, mechanical soft diet. Vascular saw patient and medical management for stenosis in left ICA. CM assisting with SNF options. EXAM: Heart:            S1S2 present without murmurs rubs gallops. Irregular. No LE edema Extremities: No cyanosis. Significant residual LUE weakness from previous CVA. RUE strength 4/5, LUE strength 1/5. T  98.4, /65, P 58, R 18, 02 SAT 91-93% RA 
                     
                    RBC 4..04, HGB 13.2, HCT 39.5, , , GLUC 120,  
                    2/1/20 MRI BRAIN:  1. Subacute left pontine ischemia 2. Remote right frontal parietal cortical and subcortical ischemic insult with associated encephalomalacia and surrounding gliosis. 3. Otherwise chronic microvascular disease. A/P:   
                    TIA r/o CVA Neurology following On eliquis and ASA, statin Swallowing screen prior to oral intake PT/OT, speech therapy MRI brain CTA head/neck without significant occlusion. Medical management for now, Vascular saw patient and agrees with this. Echo 55-60% EF. Allow permissive /120 X24 hours Check lipid panel, A1C-6.5, TSH, cardiac enzymes Neuro checks Q4 hours 
                      
                    Afib On eliquis 
                      
                    Right toe wound Consult wound care Followed by wound clinic outpatient  
                      
                    Neurology Daily Progress Note Patient doing well. Dysphagia improved. Continues to have dysarthria. MRI demonstrates left pontine infarct. TTE pending.  
                      
                    Assessment: 
                      
                    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 Hx. A.fib on Eliquis and aspirin. The patient is currently on maximal medical therapy.  TTE pending  
                      
                     
                    Plan: 
                      
 ·     Continue ASA ·     Continue Eliquis ·     Continue high intensity statin ·     Neurochecks Q4H ·     Telemetry ·     TTE pending  
                    ·     PT/OT/ST 
                    ·     BP management - normotensive, with long-term goal <130/80 ·     Smoking cessation if applicable ·     Diabetes education if applicable ·     Depression Screening prior to discharge EEG: Impression: This EEG is normal in the awake-only. No interictal epileptiform discharges or lateralizing features were seen. CM NOTE: CM met with patient and his spouse and new STR referrals sent to in network providers; [facility], [facility] and [facility]. IRC declined per Dr. Rosy Sampson. Liaisons contacted regarding pending referrals. NORVASC 5MG PO QD, ELIQUIS 2.5MG PO BID, ASPIRIN 81 MG PO QD,  LIPITOR 80 MG PO QD, LEXAPRO 20 MG PO QD, LISINOPRIL  40 MG PO QD, [X] Functional impairment (new) requiring rehabilitation initiation <= 24h and, >= One: 
                            [ ] Physical therapy (PT) evaluation and training 
                            ~--Admin,  [place] Jair on 02- 04:23 PM--~ 
                            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 
                            Coordination of rehab/medical care Counseling of PM & R care issues management Monitoring and management of medical conditions per plan of care/orders  
                            -discussed with wife in length. She feels that [facility] would be to aggressive for her . She reports that he was requiring assistance for ADLS, had cognitive issues that also led to her never leaviing him home alone. He has further declined over the last 2 1/2 yrs since his first stroke. He is currently mod assist of two for bed mobility and STS. Given this information, I agree that pt would be best served in a SNF. ... 
                             
                             
                             
                            [ ] Speech-language evaluation and training 
                            ~--Admin, IQ Admin Admin on 02- 04:22 PM--~ 
                            CORRECTION: Please see swallow eval & retraining 
                             
                             
                            ~--Admin, IQ Admin Admin on 02- 04:21 PM--~ 
                            SPEECH LANGUAGE PATHOLOGY: MODIFIED BARIUM SWALLOW STUDY: INITIAL ASSESSMENT Recommend puree consistency diet and honey thick liquids. No straws. Medications whole or crushed in puree pending patient preference. Will continue to follow for diet tolerance, potential diet tolerance, and oral motor/laryngeal strengthening exercises. RN notified of recommendations [X] Swallowing evaluation and retraining 
                            ~--Admin, IQ Admin Admin on 02- 04:22 PM--~ 
                            SPEECH LANGUAGE PATHOLOGY: MODIFIED BARIUM SWALLOW STUDY: INITIAL ASSESSMENT  Recommend puree consistency diet and honey thick liquids. No straws. Medications whole or crushed in puree pending patient preference. Will continue to follow for diet tolerance, potential diet tolerance, and oral motor/laryngeal strengthening exercises. RN notified of recommendations 
                             
                             
                             
  
Version: [employer]® 2019 [employer]  © 2019 [employer] and/or one of its [place]. All Rights Reserved. CPT only © 2018 [employer]. All Rights Reserved.  
   
LOC:Acute Adult-TIA (2/2/2020) by Beto Ramírez RN  
 
   
Review Status Review Entered In Primary 2/2/2020 16:26  
   
Criteria Review REVIEW SUMMARY 
  
Patient: Debbie Danielson Review Number: [id number] Review Status: In Primary 
  
Condition Specific: Yes 
  
Condition Level Of Care Code: ACUTE Condition Level Of Care Description: Acute 
  
  
OUTCOMES Outcome Type: Primary 
  
  
  
REVIEW DETAILS 
  
Service Date: 02/02/2020 Admit Date: 01/31/2020 Product: Michelle Peace Adult Subset: TIA 
    (Symptom or finding within 24h) 
  (Excludes PO medications unless noted) Select Day, One: 
            [ ] Episode Day 3, One: 
                [ ] ACUTE, One: 
                    [ ] Partial responder, not clinically stable for discharge and requires continued stay, >= One: 
                    ~--Admin, IQ Admin Admin on 02- 02:27 PM--~ 
                    2/2/20 Vs:  97.9.  60.  128/76,    19 sat 91% room air. Labs  na 146 k  4.0 cl  112. Glu 92  bun 24 creat  1.13.   wbc 6.5. h/h 13.5/ 40.3. plt 165   
                    2/2: 
                    Patient is alert and oriented x3. Slurred speech remains but improving per nursing staff. Spouse at bedside.  Update provided. Neuro following, MRI, echo, CTA head/neck 60-70% stenosis left ICA. ST eval done today, patient able to have nectar thick, mechanical soft diet. CM assisting with SNF options. A/P:   
                    TIA r/o CVA Neurology following On eliquis and ASA, statin Swallowing screen prior to oral intake PT/OT, speech therapy MRI brain CTA head/neck without significant occlusion. Medical management for now, will consult vascular. Echo 55-60% EF. Allow permissive /120 X24 hours Check lipid panel, A1C-6.5, TSH, cardiac enzymes Neuro checks Q4 hours Afib On eliquis Right toe wound Consult wound care Followed by wound clinic outpatient Mri brain pending Norvasc  5mg po qday. Eliquis  2.5mg po  bid. Asa 81mg po  qday D5NS @75cc/hr. Lisinopril 40mgpo   qday 
                     
                     
                     
  
Version: InterQual® 2019 [employer] [place]  © 2019 [employer] and/or one of its [place]. All Rights Reserved. CPT only © 2018 American Medical Association. All Rights Reserved. Additional Notes Vascular surgery consult:  
Role for possible carotid surgery was discussed with patient and family - will depend on factors such as MRI findings (unknown at this time) and clinical course regarding stabilization of neuro deficits. Pt status  upgraded to inpatient  on 2/2/20 Pt in observation status from 1/31/20 to 2/1/20

## 2020-02-04 NOTE — PROGRESS NOTES
02/03/20 1900 NIH Stroke Scale Interval Other (comment) LOC 0  
LOC Questions 0 LOC Commands 0 Best Gaze 0 Visual 0 Facial Palsy 1 Motor Right Arm 0 Motor Left Arm 0 Motor Right Leg 0 Motor Left Leg 0 Limb Ataxia 0 Sensory 0 Best Language 0 Dysarthria 1 Extinction and Inattention 0 Total 2 DUAL NIH with Marguerite Vela

## 2020-02-04 NOTE — INTERDISCIPLINARY ROUNDS
Interdisciplinary team rounds were held 2/4/2020 with the following team members:  Care Management, Occupational Therapy and . Plan of care discussed. See clinical pathway and/or care plan for interventions and desired outcomes.

## 2020-02-04 NOTE — PROGRESS NOTES
02/04/20 0037 NIH Stroke Scale Interval Other (comment) LOC 0  
LOC Questions 0 LOC Commands 0 Best Gaze 0 Visual 0 Facial Palsy 1 Motor Right Arm 0 Motor Left Arm 1 Motor Right Leg 0 Motor Left Leg 0 Limb Ataxia 0 Sensory 0 Best Language 0 Dysarthria 1 Extinction and Inattention 0 Total 3 Dual with Morenita Joy

## 2020-02-04 NOTE — PROGRESS NOTES
Problem: Dysphagia (Adult) Goal: *Speech Goal: (INSERT TEXT) Description LTG: Patient will tolerate least restrictive diet without overt signs or symptoms of airway compromise by discharge. STG: Patient will tolerate pureed diet and  honey thick liquids without overt signs or symptoms of aspiration 100% of the time. DOWNGRADED 2/3/20. STG: Patient will utilize compensatory strategies of slow rate and lingual sweep with min cues to improve swallow safety. STG: Patient will perform oral motor and laryngeal strengthening exercises x10 each with 80% accuracy to improve strength and coordination of swallowing function. STG: Patient will participate in modified barium swallow study as clinically indicated. MET 2/3/20. STG: Patient will participate in chewable trials for potential diet advancement. Outcome: Progressing Towards Goal 
SPEECH LANGUAGE PATHOLOGY: DYSPHAGIA- Daily Note 1 NAME/AGE/GENDER: Jennifer Montiel is a 80 y.o. male DATE: 2/4/2020 PRIMARY DIAGNOSIS: TIA (transient ischemic attack) [G45.9] [facility] [I63.9] ICD-10: Treatment Diagnosis: R13.12 Dysphagia, Oropharyngeal Phase INTERDISCIPLINARY COLLABORATION: Registered Nurse PRECAUTIONS/ALLERGIES: Patient has no known allergies. SUBJECTIVE Alert, pleasant. Left sided facial weakness, dysarthria, but intelligible at conversation level. Wife at bedside. Per RN and patient's wife, occasional coughing episodes with po intake. Orientation:  
Person Place Time Situation Pain: Pain Scale 1: Numeric (0 - 10) Pain Intensity 1: 0 OBJECTIVE Patient seen for diet tolerance and dysphagia exercises. Introduced effortful swallow with puree as resistance, however patient with strong coughing episode with puree. Provided honey thick liquid by cup without effortful swallow attempt, however another strong immediate coughing episode.  Patient reporting stasis sensation and sensation of needing to throw up. Resolved after several minutes. Completed diet tolerance. Honey thick liquid by tsp resulted in wet vocal quality on 1/6 trials. Return to honey thick liquids by cup across 4oz and puree container without overt s/sx airway compromise. Swallows delayed upon palpation with tongue pumping and difficulty initiating swallow. Verbal cues improved timeliness, however still significantly delayed. ASSESSMENT Patient continues to present with significant oropharyngeal dysphagia. Patient with inconsistent s/sx airway compromise on current diet, however modified barium swallow study completed 2/4/2020. Swallow consistently delayed and Inconsistent difficulty initiating swallow upon palpation, which appeared to impact tolerance. Swallow more timely with cues to swallow. Recommend continue puree diet/honey thick liquids by cup with supervision. Slow rate of intake, small bites/sips, remove distractions, fully upright/alert, remain upright 30 min after po intake. Wife and patient verbalized understanding of recommendations, strategies, and notifying RN if coughing episodes with po. Will follow for dysphagia treatment to increase swallow function/safety. Tool Used: Dysphagia Outcome and Severity Scale (JOSÉ) Score Comments Normal Diet  [] 7 With no strategies or extra time needed Functional Swallow  [] 6 May have mild oral or pharyngeal delay Mild Dysphagia  [] 5 Which may require one diet consistency restricted Mild-Moderate Dysphagia  [] 4 With 1-2 diet consistencies restricted Moderate Dysphagia  [x] 3 With 2 or more diet consistencies restricted Moderate-Severe Dysphagia  [] 2 With partial PO strategies (trials with ST only) Severe Dysphagia  [] 1 With inability to tolerate any PO safely Score:  Initial:  Most Recent: 3 (Date 02/04/20 ) Interpretation of Tool: The Dysphagia Outcome and Severity Scale (JOSÉ) is a simple, easy-to-use, 7-point scale developed to systematically rate the functional severity of dysphagia based on objective assessment and make recommendations for diet level, independence level, and type of nutrition. PLAN   
FREQUENCY/DURATION: Continue to follow patient 3 times a week for duration of hospital stay to address above goals. - Recommendations for next treatment session: Next treatment will address dysphagia REHABILITATION POTENTIAL FOR STATED GOALS: Excellent COMPLIANCE WITH PROGRAM/EXERCISES: Will assess as treatment progresses CONTINUATION OF SKILLED SERVICES/MEDICAL NECESSITY: 
? Patient is expected to demonstrate progress in  swallow strength, swallow timeliness, swallow function, diet tolerance and swallow safety in order to  improve swallow safety, work toward diet advancement and decrease aspiration risk. ? Patient continues to require skilled intervention due to dysphagia. RECOMMENDATIONS  
DIET:  
? continue prescribed diet 
? PO:  Pureed ? Liquids:  honey MEDICATIONS: Crushed in puree ASPIRATION PRECAUTIONS · Slow rate of intake · Small bites/sips · Upright at 90 degrees during meal 
  
COMPENSATORY STRATEGIES/MODIFICATIONS · Alternate liquids/solids · Cup/sip · Small sips and bites · SUPERVISION WITH PO INTAKE 
  
EDUCATION: 
· Recommendations discussed with Nursing · Family · Patient RECOMMENDATIONS for CONTINUED SPEECH THERAPY: YES: Anticipate need for ongoing speech therapy during this hospitalization and at next level of care. SAFETY: 
After treatment position/precautions: · Upright in bed · RN notified · Call light within reach · Wife present Total Treatment Duration:  
Time In: 1502 Time Out: 1520 [address] CCC-SLP

## 2020-02-04 NOTE — PROGRESS NOTES
SPEECH PATHOLOGY NOTE: 
 
Attempted to see patient, however another staff member present. Will re attempt at later time/date.   
 
 
 
Manan [address] CCC-SLP 
 
 Detail Level: Zone

## 2020-02-04 NOTE — PROGRESS NOTES
Problem: Mobility Impaired (Adult and Pediatric) Goal: *Acute Goals and Plan of Care (Insert Text) Description LTG: 
(1.)Mr. Fredi Rosenthal will move from supine to sit and sit to supine , scoot up and down and roll side to side in bed with MINIMAL ASSIST within 7 treatment day(s). (2.)Mr. Fredi Rosenthal will transfer from bed to chair and chair to bed with MODERATE ASSIST using the least restrictive device within 7 treatment day(s). (3.)Mr. Fredi Rosenthal will demonstrate fair dynamic sitting balance for 3 minutes within 7 treatment day(s). (4.)Mr. Fredi Rosenthal will demonstrate fair static standing balance for 3 minutes within 7 treatment days. (5.)Mr. Fredi Rosenthal will tolerate sitting upright in bedside chair x 3 hours to increase tolerance for physical activity within 7 treatment days. (5.)Mr. Fredi Rosenthal will perform B LE therapeutic exercises x 20 reps with MINIMAL ASSIST within 7 days to increase strength for improved safety and independence in transfers and gait. 
________________________________________________________________________________________________ Outcome: Progressing Towards Goal 
 
 
PHYSICAL THERAPY: Daily Note and PM 2/4/2020 INPATIENT: PT Visit Days : 2 Payor: [place] / Plan[place]O / Product Type: Managed Care Medicare /   
  
NAME/AGE/GENDER: Derek Stevens is a 80 y.o. male PRIMARY DIAGNOSIS: TIA (transient ischemic attack) [G45.9] [facility] [I63.9] TIA (transient ischemic attack) TIA (transient ischemic attack) ICD-10: Treatment Diagnosis:  
 · Generalized Muscle Weakness (M62.81) · Other lack of cordination (R27.8) · Difficulty in walking, Not elsewhere classified (R26.2) · History of falling (Z91.81) Precaution/Allergies: 
Patient has no known allergies. ASSESSMENT:  
 
Mr. Fredi Rosenthal presents sitting up in bed with wife attending.   Pt moved to edge of bed with mod assist.  pt stood and was able to partially hold L hand on walker. Pt began walking about 50' total with mod assist, however as he fatigued he was requiring mod assist x 2. Pt does require cues for upright posture, looking ahead, heel strike on L,  feet slightly because at times he will scissor. Pt returned to room in chair with wife present. Pt would probably benefit from trying platform walker next session. Pt also worked on looking to the L with conversation and during ambulation. Pt puts forth great effort and wife is a tremendous support. Pt is currently functioning far below baseline and would benefit from skilled PT. Highly intensified inpatient therapy is strongly recommended. Will continue with POC. This section established at most recent assessment PROBLEM LIST (Impairments causing functional limitations): 1. Decreased Strength 2. Decreased ADL/Functional Activities 3. Decreased Transfer Abilities 4. Decreased Ambulation Ability/Technique 5. Decreased Balance 6. Decreased Activity Tolerance 7. Decreased [place] with Home Exercise Program 
 INTERVENTIONS PLANNED: (Benefits and precautions of physical therapy have been discussed with the patient.) 1. Balance Exercise 2. Bed Mobility 3. Gait Training 4. Home Exercise Program (HEP) 5. Range of Motion (ROM) 6. Therapeutic Activites 7. Therapeutic Exercise/Strengthening 8. Transfer Training TREATMENT PLAN: Frequency/Duration: 3 times a week for duration of hospital stay Rehabilitation Potential For Stated Goals: Good REHAB RECOMMENDATIONS (at time of discharge pending progress):   
Placement: It is my opinion, based on this patient's performance to date, that Mr. Zbigniew Handley may benefit from intensive therapy at an [address] [place] [address] after discharge due to a probable need for multiple therapy disciplines and potential to make ongoing and sustainable functional improvement that is of practical value. Katherine Gearing Equipment: ? To be determined HISTORY:  
History of Present Injury/Illness (Reason for Referral): Mr. 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 Pt last known normal at 1330 when he laid down to take a nap. approx 1430 pt spouse woke him up and found to have left sided facial droop and aphasia. Initial NIHSS was 5, CT head showed no acute findings, did show remote large right MCA infarct. He was evaluated by Neurology in the ED. He was deemed to not be a candidate for mechanical thrombectomy or TPA. Pt has known ICA stenosis and has seen Vascular and deemed non surgical in the past.  Wife thinks pt speech is more clear however still significantly slurred. Denies new ext weakness, CP, SOB. Is on eliquis, ASA, statin and compliant. Past Medical History/Comorbidities: Mr. Pippa De León  has a past medical history of Arrhythmia, Arthritis, CAD (coronary artery disease) (12/1999), Carotid artery stenosis without cerebral infarction (1/18/2016), Chronic pain, Claudication ([address] Coronary atherosclerosis of native coronary artery (1/18/2016), GERD (gastroesophageal reflux disease), Hyperlipidemia, Hypertension, Kidney stones, MI (myocardial infarction) ([address] (12/1999), PAF (paroxysmal atrial fibrillation) ([address] Stroke ([address] and Thromboembolus ([address] Mr. Pippa De León  has a past surgical history that includes pr abdomen surgery proc unlisted (2000?); hx appendectomy (age 15); hx heent (1's?); hx ptca; and pr cabg, artery-vein, three (12/1999). Social History/Living Environment:  
Home Environment: Private residence One/Two Story Residence: One story Living Alone: No 
Support Systems: Spouse/Significant Other/Partner Patient Expects to be Discharged to[de-identified] Rehabilitation facility Current DME Used/Available at Home: Cane, quad, Grab bars, Shower chair, Tub transfer bench, Wheelchair Tub or Shower Type: Shower Prior Level of Function/Work/Activity: 
Pt lives with wife. Prior to admission, pt was receiving help from personal care assistanct 3 days per week for 2 hours each day. Wife assisted with ADLs. Pt was ambulating short distances within home while furniture walking or using quad cane. One recent fall. Does not drive. Personal Factors:   
      Sex:  male Age:  80 y.o. Number of Personal Factors/Comorbidities that affect the Plan of Care: 1-2: MODERATE COMPLEXITY EXAMINATION:  
Most Recent Physical Functioning:  
Gross Assessment: 
  
         
  
Posture: 
  
Balance: 
Sitting - Static: Fair (occasional) Sitting - Dynamic: Fair (occasional) Standing - Static: Poor; Fair 
Standing - Dynamic : Poor Bed Mobility: 
Supine to Sit: Moderate assistance Scooting: Maximum assistance Interventions: Safety awareness training;Verbal cues; Tactile cues Wheelchair Mobility: 
  
Transfers: 
Sit to Stand: Moderate assistance Stand to Sit: Moderate assistance Interventions: Safety awareness training;Verbal cues;Manual cues Gait: 
  
Base of Support: Narrowed; Shift to right Speed/Claire: Fluctuations; Pace decreased (<100 feet/min) Step Length: Left shortened;Right shortened Gait Abnormalities: Decreased step clearance; Ataxic; Steppage gait;Scissoring Distance (ft): 50 Feet (ft) Assistive Device: Walker, rolling Ambulation - Level of Assistance: Moderate assistance;Assist x1;Assist x2 Body Structures Involved: 1. Nerves 2. Muscles Body Functions Affected: 1. Voice and Speech 2. Neuromusculoskeletal 
3. Movement Related Activities and Participation Affected: 1. General Tasks and Demands 2. Communication 3. Mobility 4. Domestic Life Number of elements that affect the Plan of Care: 4+: HIGH COMPLEXITY CLINICAL PRESENTATION:  
Presentation: Stable and uncomplicated: LOW COMPLEXITY CLINICAL DECISION MAKIN [facility] [address] [address] AM-PAC 6 Clicks Basic Mobility Inpatient Short Form How much difficulty does the patient currently have. .. Unable A Lot A Little None 1. Turning over in bed (including adjusting bedclothes, sheets and blankets)? [] 1   [x] 2   [] 3   [] 4  
2. Sitting down on and standing up from a chair with arms ( e.g., wheelchair, bedside commode, etc.)   [] 1   [x] 2   [] 3   [] 4  
3. Moving from lying on back to sitting on the side of the bed? [] 1   [x] 2   [] 3   [] 4 How much help from another person does the patient currently need. .. Total A Lot A Little None 4. Moving to and from a bed to a chair (including a wheelchair)? [x] 1   [] 2   [] 3   [] 4  
5. Need to walk in hospital room? [x] 1   [] 2   [] 3   [] 4  
6. Climbing 3-5 steps with a railing? [x] 1   [] 2   [] 3   [] 4  
© 2007, Trustees of [address] [place] [address], under license to [employer]. All rights reserved Score:  Initial: 9 Most Recent: X (Date: -- ) Interpretation of Tool:  Represents activities that are increasingly more difficult (i.e. Bed mobility, Transfers, Gait). Medical Necessity:    
· Patient demonstrates · good ·  rehab potential due to higher previous functional level. Reason for Services/Other Comments: 
· Patient · continues to require present interventions due to patient's inability to function at baseline · . Use of outcome tool(s) and clinical judgement create a POC that gives a: Questionable prediction of patient's progress: MODERATE COMPLEXITY  
  
 
 
 
TREATMENT:  
(In addition to Assessment/Re-Assessment sessions the following treatments were rendered) Pre-treatment Symptoms/Complaints:  pt with very funny sense of humor, pleasant Pain: Initial:  
   Post Session:  0/10 Today's treatment session addressed Decreased Strength, Decreased ADL/Functional Activities, Decreased Transfer Abilities, Decreased Balance, Decreased Activity Tolerance, and Decreased [place] with Home Exercise Program to progress towards achieving goal(s) 1, 2, 3, 4, and 5. During this session, Occupational Therapy addressed ADLs to progress towards their discipline specific goal(s). Co-treatment was necessary to improve patient's ability to follow higher level commands, ability to increase activity demands, and ability to return to normal functional activity. Gait Training ( 19 minutes):  Gait training to improve and/or restore physical functioning as related to mobility, strength, balance, coordination, and dynamic movement of all extremities to improve functional mobility . Pre-gait activities performed to prepare for ambulation 50 Feet (ft) with hand held assist Moderate assistance;Assist x1;Assist x2 using proprioceptive movements Walker, rolling and moderate verbal and tactile cues related to their balance in midline  to promote improved static / dynamic standing balance . Neuromuscular Re-education: ( 0 min):  Exercise/activities to improve balance, coordination, kinesthetic sense, posture, and proprioception. Required moderate verbal and tactile cues to promote static balance in sitting, promote coordination of bilateral, upper extremity(s), lower extremity(s), and promote motor control of bilateral, upper extremity(s), lower extremity(s). Braces/Orthotics/Lines/Etc:  
· none Treatment/Session Assessment:   
· Response to Treatment:  Increased mobility able to ambulate 48' with RW and mod assist x 1-2. · Interdisciplinary Collaboration:  
o Physical Therapy Assistant 
o Registered Nurse · After treatment position/precautions:  
o Up in chair 
o Bed alarm/tab alert on 
o Bed/Chair-wheels locked 
o Bed in low position 
o Call light within reach 
o RN notified 
o Family at bedside · Compliance with Program/Exercises: Will assess as treatment progresses · Recommendations/Intent for next treatment session:   \"Next visit will focus on advancements to more challenging activities and reduction in assistance provided\". Total Treatment Duration: PT Patient Time In/Time Out Time In: 1805 Time Out: 9928 Daryl Kumar PTA

## 2020-02-05 NOTE — PROGRESS NOTES
Authorization verfiied from [employer]. Patient, wife, NP and RN updated. Transport planned for 12:30 via [facility].

## 2020-02-05 NOTE — PROGRESS NOTES
Problem: Mobility Impaired (Adult and Pediatric) Goal: *Acute Goals and Plan of Care (Insert Text) Description LTG: 
(1.)Mr. Michelle Mcgarry will move from supine to sit and sit to supine , scoot up and down and roll side to side in bed with MINIMAL ASSIST within 7 treatment day(s). (2.)Mr. Michelle Mcgarry will transfer from bed to chair and chair to bed with MODERATE ASSIST using the least restrictive device within 7 treatment day(s). (3.)Mr. Michelle Mcgarry will demonstrate fair dynamic sitting balance for 3 minutes within 7 treatment day(s). (4.)Mr. Michelle Mcgarry will demonstrate fair static standing balance for 3 minutes within 7 treatment days. (5.)Mr. Michelle Mcgarry will tolerate sitting upright in bedside chair x 3 hours to increase tolerance for physical activity within 7 treatment days. (5.)Mr. Michelle Mcgrary will perform B LE therapeutic exercises x 20 reps with MINIMAL ASSIST within 7 days to increase strength for improved safety and independence in transfers and gait. GOAL ADDED 2/5/2020 
(6.)Mr. Michelle Mcgarry will ambulate 150ft+ with MODERATE ASSIST using the least restrictive device within 7 treatment day(s). ________________________________________________________________________________________________ Outcome: Progressing Towards Goal 
 
 
PHYSICAL THERAPY: Daily Note and PM 2/5/2020 INPATIENT: PT Visit Days : 3 Payor: [facility] [place] [facility] / [place] [facility] [place] [facility] H[place]O / Product Type: Managed Care Medicare /   
  
NAME/AGE/GENDER: Jose E Cee is a 80 y.o. male PRIMARY DIAGNOSIS: TIA (transient ischemic attack) [G45.9] CVA (cerebral vascular accident) ([facility] [I63.9] TIA (transient ischemic attack) TIA (transient ischemic attack) ICD-10: Treatment Diagnosis:  
 · Generalized Muscle Weakness (M62.81) · Other lack of cordination (R27.8) · Difficulty in walking, Not elsewhere classified (R26.2) · History of falling (Z91.81) Precaution/Allergies: 
Patient has no known allergies.   
  
ASSESSMENT:  
 
 Mr. Luz Elena Andre presents sitting up in bed with wife attending. Pt moved to edge of bed with SBA. pt stood with CGA and needed 90sec to gain stability. He wanted to ambulate without AD and did for 75ft no support, last 75ft pt was holding rail reported due to fatigue. He returned to room and sat in recliner for meal with CGA. Pt improving with decreased asst levels with gait and trnfs. Pt improving and walking and added gait goal due to improvement. Will continue with POC. This section established at most recent assessment PROBLEM LIST (Impairments causing functional limitations): 1. Decreased Strength 2. Decreased ADL/Functional Activities 3. Decreased Transfer Abilities 4. Decreased Ambulation Ability/Technique 5. Decreased Balance 6. Decreased Activity Tolerance 7. Decreased [place] with Home Exercise Program 
 INTERVENTIONS PLANNED: (Benefits and precautions of physical therapy have been discussed with the patient.) 1. Balance Exercise 2. Bed Mobility 3. Gait Training 4. Home Exercise Program (HEP) 5. Range of Motion (ROM) 6. Therapeutic Activites 7. Therapeutic Exercise/Strengthening 8. Transfer Training TREATMENT PLAN: Frequency/Duration: 3 times a week for duration of hospital stay Rehabilitation Potential For Stated Goals: Good REHAB RECOMMENDATIONS (at time of discharge pending progress):   
Placement: It is my opinion, based on this patient's performance to date, that Mr. Luz Elena Andre may benefit from intensive therapy at an [address] [place] [address] after discharge due to a probable need for multiple therapy disciplines and potential to make ongoing and sustainable functional improvement that is of practical value. Katherene Means Equipment: ? To be determined HISTORY:  
History of Present Injury/Illness (Reason for Referral): Mr. 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 Pt last known normal at 1330 when he laid down to take a nap. approx 1430 pt spouse woke him up and found to have left sided facial droop and aphasia. Initial NIHSS was 5, CT head showed no acute findings, did show remote large right MCA infarct. He was evaluated by Neurology in the ED. He was deemed to not be a candidate for mechanical thrombectomy or TPA. Pt has known ICA stenosis and has seen Vascular and deemed non surgical in the past.  Wife thinks pt speech is more clear however still significantly slurred. Denies new ext weakness, CP, SOB. Is on eliquis, ASA, statin and compliant. Past Medical History/Comorbidities: Mr. Zbigniew Handley  has a past medical history of Arrhythmia, Arthritis, CAD (coronary artery disease) (12/1999), Carotid artery stenosis without cerebral infarction (1/18/2016), Chronic pain, Claudication ([address] Coronary atherosclerosis of native coronary artery (1/18/2016), GERD (gastroesophageal reflux disease), Hyperlipidemia, Hypertension, Kidney stones, MI (myocardial infarction) ([address] (12/1999), PAF (paroxysmal atrial fibrillation) ([address] Stroke ([address] and Thromboembolus ([address] Mr. Zbigniew Handley  has a past surgical history that includes pr abdomen surgery proc unlisted (2000?); hx appendectomy (age 15); hx heent (1's?); hx ptca; and pr cabg, artery-vein, three (12/1999). Social History/Living Environment:  
Home Environment: Private residence One/Two Story Residence: One story Living Alone: No 
Support Systems: Spouse/Significant Other/Partner Patient Expects to be Discharged to[de-identified] Rehabilitation facility Current DME Used/Available at Home: Cane, quad, Grab bars, Shower chair, Tub transfer bench, Wheelchair Tub or Shower Type: Shower Prior Level of Function/Work/Activity: 
Pt lives with wife. Prior to admission, pt was receiving help from personal care assistanct 3 days per week for 2 hours each day.  Wife assisted with ADLs. Pt was ambulating short distances within home while furniture walking or using quad cane. One recent fall. Does not drive. Personal Factors:   
      Sex:  male Age:  80 y.o. Number of Personal Factors/Comorbidities that affect the Plan of Care: 1-2: MODERATE COMPLEXITY EXAMINATION:  
Most Recent Physical Functioning:  
Gross Assessment: 
  
         
  
Posture: 
  
Balance: 
Sitting - Static: Fair (occasional); Good (unsupported) Sitting - Dynamic: Fair (occasional); Good (unsupported) Standing: Impaired Standing - Static: Fair Standing - Dynamic : Fair Bed Mobility: 
Rolling: Stand-by assistance Supine to Sit: Stand-by assistance Scooting: Moderate assistance Wheelchair Mobility: 
  
Transfers: 
Sit to Stand: Minimum assistance Stand to Sit: Minimum assistance Stand Pivot Transfers: Minimal assistance Gait: 
  
Distance (ft): 150 Feet (ft) Ambulation - Level of Assistance: Contact guard assistance Body Structures Involved: 1. Nerves 2. Muscles Body Functions Affected: 1. Voice and Speech 2. Neuromusculoskeletal 
3. Movement Related Activities and Participation Affected: 1. General Tasks and Demands 2. Communication 3. Mobility 4. Domestic Life Number of elements that affect the Plan of Care: 4+: HIGH COMPLEXITY CLINICAL PRESENTATION:  
Presentation: Stable and uncomplicated: LOW COMPLEXITY CLINICAL DECISION MAKIN [facility] [address] [address] AM-PAC 6 Clicks Basic Mobility Inpatient Short Form How much difficulty does the patient currently have. .. Unable A Lot A Little None 1. Turning over in bed (including adjusting bedclothes, sheets and blankets)? [] 1   [x] 2   [] 3   [] 4  
2. Sitting down on and standing up from a chair with arms ( e.g., wheelchair, bedside commode, etc.)   [] 1   [x] 2   [] 3   [] 4  
3. Moving from lying on back to sitting on the side of the bed?    [] 1   [x] 2   [] 3   [] 4  
 How much help from another person does the patient currently need. .. Total A Lot A Little None 4. Moving to and from a bed to a chair (including a wheelchair)? [x] 1   [] 2   [] 3   [] 4  
5. Need to walk in hospital room? [x] 1   [] 2   [] 3   [] 4  
6. Climbing 3-5 steps with a railing? [x] 1   [] 2   [] 3   [] 4  
© 2007, Trustees of [address] [place] [address], under license to [employer]. All rights reserved Score:  Initial: 9 Most Recent: X (Date: -- ) Interpretation of Tool:  Represents activities that are increasingly more difficult (i.e. Bed mobility, Transfers, Gait). Medical Necessity:    
· Patient demonstrates · good ·  rehab potential due to higher previous functional level. Reason for Services/Other Comments: 
· Patient · continues to require present interventions due to patient's inability to function at baseline · . Use of outcome tool(s) and clinical judgement create a POC that gives a: Questionable prediction of patient's progress: MODERATE COMPLEXITY  
  
 
 
 
TREATMENT:  
(In addition to Assessment/Re-Assessment sessions the following treatments were rendered) Pre-treatment Symptoms/Complaints:  pt with very funny sense of humor, pleasant Pain: Initial:  
Pain Intensity 1: 0  Post Session:  0/10 Today's treatment session addressed Decreased Strength, Decreased ADL/Functional Activities, Decreased Transfer Abilities, Decreased Balance, Decreased Activity Tolerance, and Decreased [place] with Home Exercise Program to progress towards achieving goal(s) 1, 2, 3, 4, and 5. During this session, Occupational Therapy addressed ADLs to progress towards their discipline specific goal(s). Co-treatment was necessary to improve patient's ability to follow higher level commands, ability to increase activity demands, and ability to return to normal functional activity.  
 
 
Gait Training (  minutes):  Gait training to improve and/or restore physical functioning as related to mobility, strength, balance, coordination, and dynamic movement of all extremities to improve functional mobility . Pre-gait activities performed to prepare for ambulation 150 Feet (ft) with hand held assist Contact guard assistance using proprioceptive movements   and moderate verbal and tactile cues related to their balance in midline  to promote improved static / dynamic standing balance . Neuromuscular Re-education: ( 0 min):  Exercise/activities to improve balance, coordination, kinesthetic sense, posture, and proprioception. Required moderate verbal and tactile cues to promote static balance in sitting, promote coordination of bilateral, upper extremity(s), lower extremity(s), and promote motor control of bilateral, upper extremity(s), lower extremity(s). Therapeutic Activity (    24min):  Therapeutic activities per grid below to improve mobility, strength, balance and coordination. Required maximal verbal cues to promote static and dynamic balance in standing, promote coordination of bilateral, lower extremity(s) and promote motor control of bilateral, lower extremity(s). Braces/Orthotics/Lines/Etc:  
· none Treatment/Session Assessment:   
· Response to Treatment:  Increased mobility able to ambulate 48' with RW and mod assist x 1-2. · Interdisciplinary Collaboration:  
o Physical Therapist 
o Registered Nurse · After treatment position/precautions:  
o Up in chair 
o Bed alarm/tab alert on 
o Bed/Chair-wheels locked 
o Bed in low position 
o Call light within reach 
o RN notified 
o Family at bedside · Compliance with Program/Exercises: Will assess as treatment progresses · Recommendations/Intent for next treatment session: \"Next visit will focus on advancements to more challenging activities and reduction in assistance provided\". Total Treatment Duration: PT Patient Time In/Time Out Time In: 6928 Time Out: 1110 Srinivasan Robbins DPT

## 2020-02-05 NOTE — PROGRESS NOTES
TRANSFER - OUT REPORT: 
 
Verbal report given to ARNULFO Sheriff(name) on Miguelangel Gamboa  being transferred to [facility](unit) for routine progression of care Report consisted of patients Situation, Background, Assessment and  
Recommendations(SBAR). Information from the following report(s) SBAR, Kardex, [facility] and Recent Results was reviewed with the receiving nurse. Opportunity for questions and clarification was provided. Patient transported with: 
 [employer]

## 2020-02-05 NOTE — PROGRESS NOTES
Problem: Dysphagia (Adult) Goal: *Speech Goal: (INSERT TEXT) Description LTG: Patient will tolerate least restrictive diet without overt signs or symptoms of airway compromise by discharge. STG: Patient will tolerate pureed diet and  honey thick liquids without overt signs or symptoms of aspiration 100% of the time. DOWNGRADED 2/3/20. STG: Patient will utilize compensatory strategies of slow rate and lingual sweep with min cues to improve swallow safety. STG: Patient will perform oral motor and laryngeal strengthening exercises x10 each with 80% accuracy to improve strength and coordination of swallowing function. STG: Patient will participate in modified barium swallow study as clinically indicated. MET 2/3/20. STG: Patient will participate in chewable trials for potential diet advancement. Outcome: Progressing Towards Goal 
SPEECH LANGUAGE PATHOLOGY: DYSPHAGIA- Daily Note 2 NAME/AGE/GENDER: Saurabh Esposito is a 80 y.o. male DATE: 2/5/2020 PRIMARY DIAGNOSIS: TIA (transient ischemic attack) [G45.9] [facility] [I63.9] ICD-10: Treatment Diagnosis: R13.12 Dysphagia, Oropharyngeal Phase INTERDISCIPLINARY COLLABORATION: Registered Nurse PRECAUTIONS/ALLERGIES: Patient has no known allergies. SUBJECTIVE Upright in chair eating lunch tray. Wife present. Wife reports patient tolerated breakfast without any overt difficulty. Orientation:  
Person Place Time Situation Pain: Pain Scale 1: Numeric (0 - 10) Pain Intensity 1: 0 OBJECTIVE Patient seen for diet tolerance and diet education as patient discharging to rehab today. Patient seen with lunch tray. Initially patient demonstrated wet vocal quality with cup sips, however observed patient to be consuming nectar thick liquids on his meal tray rather than honey. Discarded the nectar and provided appropriate honey thick liquid.  Delayed strong cough on 1/5 trials of honey thick liquids by cup. No overt s/sx with puree. Delayed swallow and difficulty initiating swallow at times upon palpation across all trials. Oral secretions pooling during conversation and spilling anteriorly. Patient unable to elicit volitional swallow to clear oral secretions. Provided education and handout regarding puree/ honey thick liquid recommendation. ASSESSMENT Patient continues to present with significant oropharyngeal dysphagia. Difficulty with volitional swallow and delayed swallow with all consistencies. Patient only minimally benefits from cues to swallow hard. Wife and patient verbalized understanding of recommendations. Recommend continue puree diet and honey thick liquids by cup with supervision. Slow rate of intake, small bites/sips, remove distractions, fully upright/alert, remain upright 30 min after po intake. Patient currently functioning below baseline and will benefit from further skilled intervention. Patient also will benefit from full speech/cognitive linguistic evaluation. Tool Used: Dysphagia Outcome and Severity Scale (JOSÉ) Score Comments Normal Diet  [] 7 With no strategies or extra time needed Functional Swallow  [] 6 May have mild oral or pharyngeal delay Mild Dysphagia  [] 5 Which may require one diet consistency restricted Mild-Moderate Dysphagia  [] 4 With 1-2 diet consistencies restricted Moderate Dysphagia  [x] 3 With 2 or more diet consistencies restricted Moderate-Severe Dysphagia  [] 2 With partial PO strategies (trials with ST only) Severe Dysphagia  [] 1 With inability to tolerate any PO safely Score:  Initial:  Most Recent: 3 (Date 02/05/20 ) Interpretation of Tool: The Dysphagia Outcome and Severity Scale (JOSÉ) is a simple, easy-to-use, 7-point scale developed to systematically rate the functional severity of dysphagia based on objective assessment and make recommendations for diet level, independence level, and type of nutrition. PLAN   
FREQUENCY/DURATION: Continue to follow patient 3 times a week for duration of hospital stay to address above goals. - Recommendations for next treatment session: Next treatment will address dysphagia and speech/cognitive linguistic evaluation REHABILITATION POTENTIAL FOR STATED GOALS: Excellent COMPLIANCE WITH PROGRAM/EXERCISES: Will assess as treatment progresses CONTINUATION OF SKILLED SERVICES/MEDICAL NECESSITY: 
? Patient is expected to demonstrate progress in  swallow strength, swallow timeliness, swallow function, diet tolerance and swallow safety in order to  improve swallow safety, work toward diet advancement and decrease aspiration risk. ? Patient continues to require skilled intervention due to dysphagia. RECOMMENDATIONS  
DIET:  
? continue prescribed diet 
? PO:  Pureed ? Liquids:  honey by cup MEDICATIONS: Crushed in puree ASPIRATION PRECAUTIONS · Slow rate of intake · Small bites/sips · Upright at 90 degrees during meal 
  
COMPENSATORY STRATEGIES/MODIFICATIONS · Alternate liquids/solids · Cup/sip · Small sips and bites · SUPERVISION WITH PO INTAKE · No straws EDUCATION: 
· Recommendations discussed patient and his wife RECOMMENDATIONS for CONTINUED SPEECH THERAPY: YES: Anticipate need for ongoing speech therapy during this hospitalization and at next level of care. SAFETY: 
After treatment position/precautions: · Up in chair · wife at bedside · Call light within reach Total Treatment Duration:  
Time In: 2907 Time Out: 0654 [address] CCC-SLP

## 2020-02-05 NOTE — PROGRESS NOTES
02/04/20 1900 NIH Stroke Scale Interval Other (comment) (DUAL NIH with JULIO C Denny)  
LOC 0  
LOC Questions 0 LOC Commands 0 Best Gaze 0 Visual 0 Facial Palsy 1 Motor Right Arm 0 Motor Left Arm 1 Motor Right Leg 0 Motor Left Leg 0 Limb Ataxia 0 Sensory 0 Best Language 0 Dysarthria 1 Extinction and Inattention 0 Total 3

## 2020-02-05 NOTE — DISCHARGE SUMMARY
Discharge Summary Patient ID: 
Tyron Hobson 
[id number] 
6 y.o. 
1934 Admit date: 1/31/2020  3:39 PM 
Discharge date and time: 2/5/2020 Attending: Elijah Lane MD 
PCP:  Junie Bundy MD 
Treatment Team: Attending Provider: Elijah Lane MD; Nurse Practitioner: Cheryl Palma NP; Utilization Review: Bety Kirk RN; Care Manager: Frances Guaman RN; Nurse Practitioner: Kaushik Tapia NP; Speech Language Pathologist: Tej Snyder; Utilization Review: [place]; Occupational Therapy Assistant: Jing Choe; Physical Therapist: Jennifer Arrington DPT; Charge Nurse: Silas Matute Principal Diagnosis TIA (transient ischemic attack) Principal Problem: 
  TIA (transient ischemic attack) (1/31/2020) Active Problems: 
  CAD (coronary artery disease) (10/6/2015) Hypertension (10/6/2015) PAF (paroxysmal atrial fibrillation) ([facility].) (10/6/2015) Overview: chads vasc 6 Dyslipidemia (10/6/2015) CVA (cerebral vascular accident) ([facility] (2/2/2020) * Admission Diagnoses: TIA (transient ischemic attack) [G45.9] CVA (cerebral vascular accident) ([facility].) [I63.9] * Discharge Diagnoses:   
Hospital Problems as of 2/5/2020 Never Reviewed Codes Class Noted - Resolved POA  
 CVA (cerebral vascular accident) ([facility].) ICD-10-CM: I63.9 ICD-9-CM: 434.91  2/2/2020 - Present Unknown * (Principal) TIA (transient ischemic attack) ICD-10-CM: G45.9 ICD-9-CM: 435.9  1/31/2020 - Present Unknown CAD (coronary artery disease) (Chronic) ICD-10-CM: I25.10 ICD-9-CM: 414.00  10/6/2015 - Present Yes Hypertension (Chronic) ICD-10-CM: I10 
ICD-9-CM: 401.9  10/6/2015 - Present Yes PAF (paroxysmal atrial fibrillation) (HCC) (Chronic) ICD-10-CM: I48.0 ICD-9-CM: 427.31  10/6/2015 - Present Yes Overview Signed 4/26/2017 12:59 PM by Vince Costello MD  
  [facility] 6 Dyslipidemia (Chronic) ICD-10-CM: Z25.0 ICD-9-CM: 272.4  10/6/2015 - Present Yes Hospital Course: 
  
Mr. 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  Pt last known normal at 1330 when he laid down to take a nap.  approx 1430 pt spouse woke him up and found to have left sided facial droop and aphasia.  Initial NIHSS was 5, CT head showed no acute findings, did show remote large right MCA infarct.  He was evaluated by Neurology in the ED. Susy Ortez was deemed to not be a candidate for mechanical thrombectomy or TPA.  Pt has known ICA stenosis and has seen Vascular and deemed non surgical in the past. MRI brain showed subacute left pontine ischemia, remote right frontal parietal cortical and subcortical ischemic insult with associated encephalomalacia with surrounding gliosis. Echo with EF 55-60%, no shunt identified. EEG normal.  Pt is on eliquis and ASA. Vascular consulted, no surgical intervention deemed necessary. Dysphagia  improved. Has been approved for STR. Diagnostic Study/Procedure results summary copied from within [facility] EMR: 
 
CT HEAD WITHOUT CONTRAST. 
  
INDICATION: Left facial droop. 
  
COMPARISON: None.   
  
TECHNIQUE:   5 mm axial scans from the skull base to the vertex. Radiation dose 
reduction techniques were used for this study. Our CT scanners use one or more 
of the following:  Automated exposure control, adjustment of the mA and or kV 
according to patient size, iterative reconstruction. 
  
FINDINGS:  No acute intraparenchymal hemorrhage or abnormal extra-axial fluid 
collection. The ventricles are normal size. Large area of encephalomalacia from 
prior right MCA infarct. No midline shift mass effect.   Included portion of the 
paranasal sinuses and orbits unremarkable. 
  
IMPRESSION 
 IMPRESSION:  Negative for acute intracranial abnormality. Chronic changes. Title:  CT arteriogram of the neck and head.   
  
Indication: TIA (transient ischemic attack). 
  
Technique: Axial images of the neck and head were obtained after the uneventful  
administration of intravenous iodinated contrast media. Contrast was used to 
best identify the arterial structures. Images were reviewed on a separate, free 
standing, three-dimensional workstation as per the referring physicians request. 
  
  
All stenosis percentages derived by comparing the narrowest segment with the 
distal Internal Carotid Artery luminal diameter, as described in the [place] [employer] Symptomatic Carotid Endarterectomy Trial (NASCET) criteria.  
  
All CT scans at this facility are performed using dose reduction/dose modulation 
techniques, as appropriate the performed exam, including the following: Automated Exposure Control; Adjustment of the mA and/or kV according to patient 
size (this includes techniques or standardized protocols for targeted exams 
where dose is matched to indication/reason for exam); and Use of Iterative Reconstruction Technique. Comparison: Ultrasound 9/14/2017. . 
  
Findings: 
  
Brain[de-identified] No midline shift or mass effect. No enhancing mass lesion. Low-density 
area in the right MCA territory as detailed on the recent CT head exam. 
Lungs:  No focal consolidation or pleural effusions. No suspicious pulmonary 
nodules. Angel Spies Bones:  No osseous destruction. . Kyphosis in the upper thoracic spine. Moderate 
to advanced multilevel degenerative changes with degenerative disc disease 
present throughout the upper cervical spine. Paranasal sinuses:  Paranasal sinuses are clear. Angel Spies Brain:  No midline shift or mass effect. No enhancing mass lesion. Soft tissues:  Partial opacification of the right maxillary sinus. .   
  
Dural venous sinuses:  Patent.  
  
 Aortic arch: Moderate calcific atherosclerosis. The aorta is nonaneurysmal.. Right brachiocephalic artery:  No significant stenosis or occlusion. .  . Right subclavian artery:  No significant stenosis or occlusion. .  . Left subclavian artery:  No significant stenosis or occlusion. .  .   
  
Right common carotid artery:  No significant stenosis or occlusion. .  . Right external carotid artery:  No significant stenosis or occlusion. .  . Right internal carotid artery:  No significant stenosis or occlusion. Mabeline Sink Atherosclerosis is present at the right ICA; however, there is no significant 
stenosis. 
  
Left common carotid artery: No significant stenosis or occlusion. .  . Left external carotid artery:  No significant stenosis or occlusion. .  . Left internal carotid artery:  There is a 60-70% stenosis at the origin of the 
left ICA. .   
  
Right vertebral artery:  No significant stenosis or occlusion. .. Left vertebral artery:  Mild stenosis at the origin of the left vertebral 
artery. Mabeline Sink Basilar artery:  No significant stenosis, occlusion, or aneurysm. .   
  
Right middle cerebral artery:  Remote ischemic changes are present in the right 
MCA. No proximal right MCA cut off or occlusion. Right anterior cerebral artery:  No significant stenosis, occlusion, or 
aneurysm. Mabeline Sink Anterior communicating artery: No significant stenosis, occlusion, or aneurysm. . 
  
  
Left middle cerebral artery:  No significant stenosis, occlusion, or aneurysm. .  
  
Left anterior cerebral artery:  No significant stenosis, occlusion, or 
aneurysm. .   
  
Right posterior communicating artery: No significant stenosis, occlusion, or 
aneurysm. Mabeline Sink Left posterior communicating artery:  No significant stenosis, occlusion, or 
aneurysm. .   
  
Right posterior cerebral artery:  No significant stenosis, occlusion, or 
aneurysm. Mabeline Sink Left posterior cerebral artery:  No significant stenosis, occlusion, or 
aneurysm. .   
  
IMPRESSION 
 Impression: 1. No acute arterial cutoff or occlusion. Moderate diffuse calcific 
atherosclerosis. 2.  There is a 60-70% percent stenosis at the origin of the left ICA. Modified Barium Swallow 
  INDICATION: Difficulty swallowing post CVA 
  
Fluoro time: 3.6 minutes Spot films:  0 
  
Fluoroscopy was used to evaluate pharyngeal function while the patient was given 
barium solutions and barium coated solids. 
  
FINDINGS: Patient aspirated thin and nectar thick liquids. Honey thick was 
better tolerated. There is no significant residual. 
  
IMPRESSION IMPRESSION: Aspiration of thin and nectar thick liquids. Title: MRA [place] 
  
Indication:  Aphasia. 
  
Comparison:  Contemporaneous CTA head and neck exam.. 
  
Technique:  Contiguous axial 3D time-of-flight images of the brain were used to 
generate maximum intensity projection images of the [place] and 
vertebrobasilar system. Axial source images as well as maximum intensity 
reconstructed 3-D Images were reviewed at the request of the referring 
physician. 
  
All stenosis percentages derived by comparing the narrowest segment with the 
distal Internal Carotid Artery luminal diameter, as described in the [place] [employer] Symptomatic Carotid Endarterectomy Trial (NASCET) criteria.  
  
Findings:  The visualized portions of paranasal sinuses and mastoid air cells 
are clear. 
  
Axial source images of the brain demonstrates no evidence midline shift/mass 
effect. Remote right MCA territory ischemia. 
  
Normal high signal intensity suggesting normal flow in the patent Right Internal 
Carotid Artery, Right Middle Cerebral Artery, and Right Anterior Cerebral 
Artery. Chronic changes are present in the right MCA.  The proximal right MCA is 
patent.  
  
Anterior Communicating Artery is patent. 
  
Normal high signal intensity suggesting normal flow in the patent Left Internal 
 Carotid Artery, Left Middle Cerebral Artery, and Left Anterior Cerebral Artery.  
  
  
The Right and Left Vertebral Arteries are patent. Basilar artery is patent 
without stenosis. Right and Left Posterior Cerebral arteries are patent. 
  
Normal signal in the superior sagittal sinus, straight sinus, and sigmoid 
sinuses. 
  
IMPRESSION Impression:  No MRA evidence of acute intracranial arterial thromboembolic 
disease. Chronic changes in the right MCA. 
  
 
Clinical History: The patient is a 80years year old Male presenting with 
symptoms of dysarthria, left facial droop, prior RMCA infarct 
  
Comparison:  Head CT 1/31/2020 
  
Technique:  Axial T2, axial FLAIR, axial diffusion-weighted,  sagittal T1 and 
coronal gradient-echo scans were performed.   
  
Findings:  
  
Subacute ischemic changes are seen in the left omayra. There is otherwise chronic 
microvascular disease. 
  
Extensive remote ischemic changes are noted throughout the right frontal 
parietal convexity encephalomalacia and surrounding gliosis as well as laminar 
necrosis. Associated wallerian degeneration of the right cerebral peduncle is 
demonstrated. There are otherwise age-related senescent changes with sulcal and 
ventricular prominence. Mild chronic confluent periventricular white matter 
disease is seen. Throughout the corona radiata and centrum semiovale as well as 
basal ganglia. There are no abnormal extra-axial fluid collections. No evidence 
of mass or mass effect is seen. There is no diffusion signal abnormality. Expected flow voids are maintained in the major intracranial vessels. 
  
Mild cerebellar involutional changes are seen however Is no evidence of Chiari 
malformation. 
  
The ventricular system and CSF containing spaces are unremarkable in appearance. 
  
Visualized extracranial soft tissues are unremarkable. 
  
The paranasal sinuses are well pneumatized and aerated. 
  
IMPRESSION Impression: 
  
 1. Subacute left pontine ischemia 2. Remote right frontal parietal cortical and subcortical ischemic insult with 
associated encephalomalacia and surrounding gliosis. 3. Otherwise chronic microvascular disease. 
  
DC4 The significant findings in this report have been referred to the Imaging Navigator in order to communicate to the referring provider or his/her designee 
as outlined in Section II.C.2.a.ii-iii of the ACR Practice Guideline for Communication of Diagnostic Imaging Findings.  
  
CPT code(s) Melissa Johnsono [address] [place], [address] [facility] 
[phone] 
  
Transthoracic Echocardiogram 
2D, M-mode, Doppler, and Color Doppler 
  
Patient: Ernesto Lott 
MR #: [id number] : 1934 Age: 80 years Gender: Male Study date: 2020 Account #: [de-identified] Height: 68 in 
Weight: 144.8 lb 
BSA: 1.78 mï¾² Status:Routine Location: [facility] BP: 153/ 68 
  
Allergies: NO KNOWN ALLERGENS 
  
Sonographer:  Carrie Gay RD Group:  [facility] Referring Physician:  Ramesh Haider NP Reading Physician:  Alexandro Sparrow MD [facility] 
  
INDICATIONS: Aphasia, r/o CVA 
  
PROCEDURE: This was a routine study. A transthoracic echocardiogram was 
performed. The study included complete 2D imaging, M-mode, complete spectral 
Doppler, and color Doppler. Intravenous contrast (agitated saline, 9 ml) was 
administered to evaluate possible R-L intracardiac shunting. Image quality  
was 
adequate. 
  
LEFT VENTRICLE: Size was normal. Systolic function was normal. Ejection 
fraction was estimated in the range of 55 % to 60 %. There were no regional 
wall motion abnormalities. There was mild concentric hypertrophy. Left 
ventricular diastolic function parameters were normal. E/e' av.03. 
  
VENTRICULAR SEPTUM: Thickness was moderately increased. 
  
RIGHT VENTRICLE: The size was normal. Systolic function was reduced. Estimated peak pressure was in the range of 30-35 mmHg. 
  
LEFT ATRIUM: The atrium was mildly dilated. 
  
ATRIAL SEPTUM: Contrast injection was performed. There was no right-to-left 
shunt, with provocative maneuvers to increase right atrial pressure. 
  
RIGHT ATRIUM: The atrium was mildly dilated. 
  
SYSTEMIC VEINS: IVC: The inferior vena cava was dilated. The respirophasic 
change in diameter was more than 50%. 
  
AORTIC VALVE: The valve was functionally bicuspid. Leaflets exhibited 
calcification. The left coronary cusp The aortic valve area by the continuity 
equation was 1.2 cm2. The peak velocity was 2.27 m/s. The mean pressure 
gradient was 11.02 mmHg. The peak pressure gradient was 20.55 mmHg. There was 
no insufficiency. 
  
MITRAL VALVE: Valve structure was normal. There was no evidence for stenosis. There was mild regurgitation. 
  
TRICUSPID VALVE: The valve structure was normal. There was no evidence for 
stenosis. There was mild regurgitation. 
  
PULMONIC VALVE: The valve structure was normal. There was no evidence for 
stenosis. There was no insufficiency. 
  
PERICARDIUM: There was no pericardial effusion. 
  
AORTA: The root exhibited normal size. 
  
SUMMARY: 
  
-  Left ventricle: Systolic function was normal. Ejection fraction was 
estimated in the range of 55 % to 60 %. There were no regional wall motion 
abnormalities. There was mild concentric hypertrophy. 
  
-  Ventricular septum: Thickness was moderately increased. 
  
-  Right ventricle: Systolic function was reduced. 
  
-  Left atrium: The atrium was mildly dilated. 
  
-  Atrial septum: Contrast injection was performed. There was no  
right-to-left 
shunt, with provocative maneuvers to increase right atrial pressure. 
  
-  Right atrium: The atrium was mildly dilated. 
  
-  Inferior vena cava, hepatic veins: The inferior vena cava was dilated. The 
respirophasic change in diameter was more than 50%.  
  
 -  Aortic valve: The valve was functionally bicuspid. Leaflets exhibited 
calcification. The aortic valve area by the continuity equation was 1.2 cm2. 
  
-  Mitral valve: There was mild regurgitation. 
  
-  Tricuspid valve: There was mild regurgitation. 
  
SYSTEM MEASUREMENT TABLES 
  
2D Ao Diam: 3.2 cm 
LA Diam: 4.6 cm 
LAEDV Index (A-L): 37.3 ml/m2 %FS: 46.6 % IVSd: 1.5 cm LVIDd: 3.7 cm 
LVIDs: 2 cm 
LVOT Diam: 2 cm 
LVPWd: 1.3 cm 
  
CW 
TR Vmax: 2.6 m/s 
TR maxP.1 mmHg 
  
PW RVSP: 34.1 mmHg 
  
Prepared and signed by 
  
Aysha Guaman MD [facility] Signed 2020 17:54:05 
  
 
 
 
 
 
 
 
 
Labs: Results:  
   
Chemistry Recent Labs 20 
0520 
0534 20 
0540 GLU 95 97 120* * 147* 148* K 3.8 3.9 3.8 * 113* 114* CO2 27 26 26 BUN 22 18 22 CREA 1.09 0.97 1.12  
CA 8.9 9.2 8.7 AGAP 6* 8 8 CBC w/Diff Recent Labs 20 
0520 
0534 20 
0540 WBC 6.5 6.8 6.0  
RBC 4.13* 4.32 4.04* HGB 13.2* 13.6 13.2* HCT 40.5* 42.5 39.5*  169 154 GRANS 64 68 68 LYMPH 19 16 16 EOS 8* 7 4 Cardiac Enzymes No results for input(s): CPK, CKND1, NAEL in the last 72 hours. No lab exists for component: Janey Mar Coagulation No results for input(s): PTP, INR, APTT, INREXT in the last 72 hours. Lipid Panel @BRIEFLAB(CHOL,CHOLPOCT,114321,[id number],CHOLX,CHOLP,CHLST,CHOLV,033383,HDL,HDLPOC,HDLPOCT,036553,NHDLCT,[id number],HDLC,HDLP,LDL,LDLPOCT,LDLCPOC,722129,NLDLCT,DLDL,LDLC,DLDLP,702399,VLDLC,VLDL,TGL,TGLX,TRIGL,[id number],TRIGP,TGLPOCT,085872,479013,CHHD,CHHDX)@ BNP No results for input(s): BNPP in the last 72 hours. Liver Enzymes No results for input(s): TP, ALB, TBIL, AP, SGOT, GPT in the last 72 hours. No lab exists for component: DBIL Thyroid Studies Lab Results Component Value Date/Time TSH 1.580 2016 02:41 PM  
    
 
 
Discharge Exam: 
Visit Vitals /62 (BP 1 Location: Left arm, BP Patient Position: At rest) Pulse 69 Temp 97.9 °F (36.6 °C) Resp 18 Wt 64.3 kg (141 lb 12.8 oz) SpO2 94% BMI 21.56 kg/m² General:       Alert, cooperative, no distress, appears stated age. Maeola Clifford, without obvious abnormality, atraumatic. Nose:            Nares normal. No drainage or sinus tenderness. Lungs:          CTA, resp even and nonlabored Heart:            S1S2 present without murmurs rubs gallops. Irregular.  No LE edema.   
Abdomen:    Soft, non-tender. Not distended.  Bowel sounds normal. No masses Extremities: No cyanosis. Significant residual LUE weakness from previous CVA.  RUE strength 4/5, LUE strength 1/5 (residual from previous CVA).   
Skin:             Texture, turgor normal. No rashes or lesions.  Not Jaundiced.  Right great toe with bandage c/d/i Neurologic:  Alert and oriented X3.  PERRLA. Left facial droop. Slurred speech. Disposition: SNF Discharge Condition: stable Patient Instructions:  
Current Discharge Medication List  
  
CONTINUE these medications which have NOT CHANGED Details  
amLODIPine (NORVASC) 5 mg tablet Take 1 Tab by mouth daily. Qty: 90 Tab, Refills: 3  
  
apixaban (ELIQUIS) 2.5 mg tablet Take 1 Tab by mouth two (2) times a day. Qty: 180 Tab, Refills: 3  
  
atorvastatin (LIPITOR) 80 mg tablet Take 0.5 Tabs by mouth daily. Qty: 90 Tab, Refills: 3  
  
lisinopril (PRINIVIL, ZESTRIL) 40 mg tablet Take 1 Tab by mouth daily. Qty: 90 Tab, Refills: 3  
  
allopurinol (ZYLOPRIM) 100 mg tablet Take 100 mg by mouth daily. aspirin 81 mg chewable tablet Take 81 mg by mouth daily. nitroglycerin (NITROSTAT) 0.4 mg SL tablet 1 Tab by SubLINGual route every five (5) minutes as needed for Chest Pain. Qty: 2 Bottle, Refills: 4  
  
escitalopram oxalate (LEXAPRO) 10 mg tablet Take 20 mg by mouth daily. Activity: PT/OT per Home Health Diet: pureed tanisha moderately thick liquids Wound Care: Keep wound clean and dry and followed by outpatient wound clinic right toe DNR Surrogate decision maker:  Wife Pneumonia and flu vaccine to be administered at discharge per hospital protocol Follow-up ·   FU PCP 3 days ·   FU Neurology as scheduled · Continue ASA, eliquis, statin Notes, labs, VS, diagnostic testing reviewed Case discussed with pt, care team, Dr. Maryam Cox, wife at bedside Time spent to discharge patient 45 min Signed: 
Moise Carvalho NP 
2/5/2020 
11:37 AM

## 2020-02-05 NOTE — PROGRESS NOTES
02/05/20 2850 NIH Stroke Scale Interval Other (comment) LOC 0  
LOC Questions 0 LOC Commands 0 Best Gaze 0 Visual 0 Facial Palsy 1 Motor Right Arm 0 Motor Left Arm 1 Motor Right Leg 0 Motor Left Leg 0 Limb Ataxia 0 Sensory 0 Best Language 0 Dysarthria 1 Extinction and Inattention 0 Total 3 Dual NIH w/ Jonh Downey

## 2020-05-17 PROBLEM — E87.5 HYPERKALEMIA: Status: ACTIVE | Noted: 2020-01-01

## 2020-05-17 PROBLEM — N17.9 AKI (ACUTE KIDNEY INJURY) (HCC): Status: ACTIVE | Noted: 2020-01-01

## 2020-05-17 PROBLEM — S42.92XA CLOSED FRACTURE OF LEFT SHOULDER: Status: ACTIVE | Noted: 2020-01-01

## 2020-05-17 PROBLEM — S72.002A CLOSED LEFT HIP FRACTURE, INITIAL ENCOUNTER (HCC): Status: ACTIVE | Noted: 2020-01-01

## 2020-05-17 NOTE — H&P
HOSPITALIST H&P/CONSULT 
NAME:  Bety Urias  
Age:  80 y.o. 
:   1934 MRN:   [id number] PCP: Paul Freeman MD 
Consulting MD: Treatment Team: Attending Provider: Martin Saleh DO; Primary Nurse: Susan Gilliam 
HPI:  
81 yo CM with past history of Afib (on Eliquis), prior CVA, CAD, GERd, HTN, and HLD presents via EMS from home. Patient fell while reaching for towel after finishing in the shower. His wife tried to help him to the ground. Patient did not bump his head nor did he loss consciousness or black out. At bedside, he currently denies fevers/chills, chest pain, shortness of breath, abdominal pain, nausea/vomiting, or diarrhea. ED Course: x-rays show left hip fracture and left shoulder fracture. Labs show SCr of 2.5. CT head negative for acute events, does show old infarction. K of 5.8. CK of 593. EKG shows Afib. Given Dilaudid and Zofran in ED. Orthopedic surgery consulted. Hospitalist called for admission. Complete ROS done and is as stated in HPI or otherwise negative Past Medical History:  
Diagnosis Date  Arrhythmia   
 reason for current procedure  Arthritis   
 osteo  CAD (coronary artery disease) 1999 CABG no stents  Carotid artery stenosis without cerebral infarction 2016  Chronic pain   
 arthritic  Claudication ([address]  Coronary atherosclerosis of native coronary artery 2016  GERD (gastroesophageal reflux disease)   
 controlled with meds  Hyperlipidemia  Hypertension   
 controlled with meds  Kidney stones   
 hx of  
 MI (myocardial infarction) ([address] 1999  PAF (paroxysmal atrial fibrillation) ([address]  Stroke ([address]  Thromboembolus ([address] Past Surgical History:  
Procedure Laterality Date  [facility] [address] UNLISTED  ? hernia repair  CABG, ARTERY-VEIN, THREE  1999 CABG no stents  HX APPENDECTOMY  age 15  
 HX HEENT  12's?  
 left cataract  HX PTCA Prior to Admission Medications Prescriptions Last Dose Informant Patient Reported? Taking?  
allopurinol (ZYLOPRIM) 100 mg tablet   Yes No  
Sig: Take 100 mg by mouth daily. amLODIPine (NORVASC) 5 mg tablet   No No  
Sig: Take 1 Tab by mouth daily. apixaban (ELIQUIS) 2.5 mg tablet   No No  
Sig: Take 1 Tab by mouth two (2) times a day. aspirin 81 mg chewable tablet   Yes No  
Sig: Take 81 mg by mouth daily. atorvastatin (LIPITOR) 80 mg tablet   No No  
Sig: Take 0.5 Tabs by mouth daily. escitalopram oxalate (LEXAPRO) 10 mg tablet   Yes No  
Sig: Take 20 mg by mouth daily. lisinopril (PRINIVIL, ZESTRIL) 40 mg tablet   No No  
Sig: Take 1 Tab by mouth daily. nitroglycerin (NITROSTAT) 0.4 mg SL tablet   No No  
Si Tab by SubLINGual route every five (5) minutes as needed for Chest Pain. Facility-Administered Medications: None No Known Allergies Social History Tobacco Use  Smoking status: Former Smoker Packs/day: 1.50 Years: 35.00 Pack years: 52.50 Last attempt to quit: 10/1/1986 Years since quittin.6  Smokeless tobacco: Never Used  Tobacco comment: quit  Substance Use Topics  Alcohol use: Yes Alcohol/week: 6.7 standard drinks Types: 7 Glasses of wine, 1 Shots of liquor per week Comment: minimal  
  
Family History Problem Relation Age of Onset  Coronary Artery Disease Other   
     family history Objective:  
 
Visit Vitals /56 Pulse 86 Temp 98.5 °F (36.9 °C) Resp 20 Ht 5' 8\" (1.727 m) Wt 64 kg (141 lb) SpO2 99% BMI 21.44 kg/m² Temp (24hrs), Av.5 °F (36.9 °C), Min:98.5 °F (36.9 °C), Max:98.5 °F (36.9 °C) Oxygen Therapy O2 Sat (%): 99 % (20 1451) O2 Device: Room air (20 1451) Physical Exam: 
General:    Alert, cooperative, no distress, appears stated age. Head:   Normocephalic, without obvious abnormality, atraumatic. Nose:  Nares normal. No drainage or sinus tenderness. Lungs:   Clear to auscultation bilaterally. No Wheezing or Rhonchi. No rales. Heart:   Irregularly irregular rhythm Abdomen:   Soft, non-tender. Not distended. Bowel sounds normal.  
Extremities: Left hip externally rotated and tender to palption Skin:     Texture, turgor normal. No rashes or lesions. Not Jaundiced Neurologic: Alert and oriented x 3, no focal deficits Data Review:  
Recent Results (from the past 24 hour(s)) CK Collection Time: 05/17/20  3:19 PM  
Result Value Ref Range  (H) 21 - 215 U/L  
EKG, 12 LEAD, INITIAL Collection Time: 05/17/20  3:19 PM  
Result Value Ref Range Ventricular Rate 79 BPM  
 Atrial Rate 104 BPM  
 QRS Duration 86 ms  
 Q-T Interval 368 ms QTC Calculation (Bezet) 421 ms Calculated R Axis 76 degrees Calculated T Axis 79 degrees Diagnosis    
  !! AGE AND GENDER SPECIFIC ECG ANALYSIS !! Atrial fibrillation Septal infarct (cited on or before 07-NOV-2015) Abnormal ECG When compared with ECG of 31-JAN-2020 16:12, 
Criteria for Lateral infarct are no longer Present CBC WITH AUTOMATED DIFF Collection Time: 05/17/20  3:20 PM  
Result Value Ref Range WBC 10.5 4.3 - 11.1 K/uL  
 RBC 3.40 (L) 4.23 - 5.6 M/uL  
 HGB 10.7 (L) 13.6 - 17.2 g/dL HCT 33.2 (L) 41.1 - 50.3 % MCV 97.6 79.6 - 97.8 FL  
 MCH 31.5 26.1 - 32.9 PG  
 MCHC 32.2 31.4 - 35.0 g/dL  
 RDW 14.0 11.9 - 14.6 % PLATELET 830 446 - 493 K/uL MPV 12.4 (H) 9.4 - 12.3 FL ABSOLUTE NRBC 0.00 0.0 - 0.2 K/uL  
 DF AUTOMATED NEUTROPHILS 77 43 - 78 % LYMPHOCYTES 10 (L) 13 - 44 % MONOCYTES 12 4.0 - 12.0 % EOSINOPHILS 0 (L) 0.5 - 7.8 % BASOPHILS 0 0.0 - 2.0 % IMMATURE GRANULOCYTES 0 0.0 - 5.0 %  
 ABS. NEUTROPHILS 8.1 1.7 - 8.2 K/UL  
 ABS. LYMPHOCYTES 1.0 0.5 - 4.6 K/UL  
 ABS. MONOCYTES 1.3 0.1 - 1.3 K/UL  
 ABS. EOSINOPHILS 0.0 0.0 - 0.8 K/UL  
 ABS. BASOPHILS 0.0 0.0 - 0.2 K/UL ABS. IMM. GRANS. 0.0 0.0 - 0.5 K/UL METABOLIC PANEL, COMPREHENSIVE Collection Time: 05/17/20  3:20 PM  
Result Value Ref Range Sodium 143 136 - 145 mmol/L Potassium 5.8 (H) 3.5 - 5.1 mmol/L Chloride 108 (H) 98 - 107 mmol/L  
 CO2 27 21 - 32 mmol/L Anion gap 8 7 - 16 mmol/L Glucose 178 (H) 65 - 100 mg/dL BUN 45 (H) 8 - 23 MG/DL Creatinine 2.51 (H) 0.8 - 1.5 MG/DL  
 GFR est AA 32 (L) >60 ml/min/1.73m2 GFR est non-AA 26 (L) >60 ml/min/1.73m2 Calcium 9.2 8.3 - 10.4 MG/DL Bilirubin, total 0.5 0.2 - 1.1 MG/DL  
 ALT (SGPT) 28 12 - 65 U/L  
 AST (SGOT) 30 15 - 37 U/L Alk. phosphatase 78 50 - 136 U/L Protein, total 6.6 6.3 - 8.2 g/dL Albumin 3.6 3.2 - 4.6 g/dL Globulin 3.0 2.3 - 3.5 g/dL A-G Ratio 1.2 1.2 - 3.5 TYPE & SCREEN Collection Time: 05/17/20  3:20 PM  
Result Value Ref Range Crossmatch Expiration 05/20/2020 ABO/Rh(D) A POSITIVE Antibody screen NEG PROTHROMBIN TIME + INR Collection Time: 05/17/20  3:20 PM  
Result Value Ref Range Prothrombin time 15.7 (H) 12.0 - 14.7 sec INR 1.2 Imaging Omar Oshea Assessment and Plan: Active Hospital Problems Diagnosis Date Noted  Closed left hip fracture, [facility] encounter [facility] 05/17/2020  TERRY (acute kidney injury) ([facility] 05/17/2020  Hyperkalemia 05/17/2020  Closed fracture of left shoulder 05/17/2020  CAD (coronary artery disease) 10/06/2015  Dyslipidemia 10/06/2015  Hypertension 10/06/2015  PAF ([facility]oxysmal [facility] fibrillation) [facility] 10/06/2015  
  chads vasc 6 PLAN # TERRY 
- serum CK fo 533, will trend 
- started on maintenance IVFs 
- hold home lisinopril 
- avoid IV contrast, morphine, NSAIDs, and nephrotoxic meds 
- monitor with routine AM labs # Left hip fx/left shoulder fx 
- orthopedic surgery consulted - pain management # Hyperkalemia 
- ordered 10 units of regular insulin and D50 
- likely due to TERRY - ordered calcium gluconate 
- fluids, as above 
- monitor with repeat K later tonight # Afib 
- hold home Eliquis due to TERRY 
- currently rate-controlled # HTN 
- hold lisinopril due to TERRY 
- continue amlodipine # HLD 
- hold home statin F/E/N: maintenance fluids, replete electrolytes as needed, cardiac diet and NPO after MN Ppx: heparin gtt for VTE Code Status: FULL CODE (discussed with patient at bedside) Disposition: Admit to Inpatient with plan as above. PT/OT consults. PPD ordered. Discussed with patient at bedside and with wife via phone. All questions answered. Signed By: Nahid Devi,  May 17, 2020

## 2020-05-17 NOTE — ED PROVIDER NOTES
The history is provided by the patient and the EMS personnel. Fall The accident occurred yesterday. Fall occurred: reaching for a towel getting out of shower. He fell from a height of ground level. He landed on hard floor. There was no blood loss. Point of impact: left hip, shulder, head? Pain location: left hip, left shoulder. The pain is mild. He was not ambulatory at the scene. There was no entrapment after the fall. There was no drug use involved in the accident. There was no alcohol use involved in the accident. Associated symptoms include headaches. Pertinent negatives include no fever, no numbness, no abdominal pain, no nausea, no vomiting, no hematuria, no loss of consciousness, no tingling and no laceration. The symptoms are aggravated by activity and use of injured limb. He has tried nothing for the symptoms. Past Medical History:  
Diagnosis Date  Arrhythmia   
 reason for current procedure  Arthritis   
 osteo  CAD (coronary artery disease) 12/1999 CABG no stents  Carotid artery stenosis without cerebral infarction 1/18/2016  Chronic pain   
 arthritic  Claudication ([address]  Coronary atherosclerosis of native coronary artery 1/18/2016  GERD (gastroesophageal reflux disease)   
 controlled with meds  Hyperlipidemia  Hypertension   
 controlled with meds  Kidney stones   
 hx of  
 MI (myocardial infarction) ([address] 12/1999  PAF (paroxysmal atrial fibrillation) ([address]  Stroke ([address]  Thromboembolus ([address] Past Surgical History:  
Procedure Laterality Date [address] [place] [address] UNLISTED  2000? hernia repair  CABG, ARTERY-VEIN, THREE  12/1999 CABG no stents  HX APPENDECTOMY  age 15  
 HX HEENT  12's?  
 left cataract  HX PTCA Family History:  
Problem Relation Age of Onset  Coronary Artery Disease Other   
     family history Social History Socioeconomic History  Marital status:   
 Spouse name: Not on file  Number of children: Not on file  Years of education: Not on file  Highest education level: Not on file Occupational History  Not on file Social Needs  Financial resource strain: Not on file  Food insecurity Worry: Not on file Inability: Not on file  Transportation needs Medical: Not on file Non-medical: Not on file Tobacco Use  Smoking status: Former Smoker Packs/day: 1.50 Years: 35.00 Pack years: 52.50 Last attempt to quit: 10/1/1986 Years since quittin.6  Smokeless tobacco: Never Used  Tobacco comment: quit  Substance and Sexual Activity  Alcohol use: Yes Alcohol/week: 6.7 standard drinks Types: 7 Glasses of wine, 1 Shots of liquor per week Comment: minimal  
 Drug use: No  
  Types: Prescription, OTC  Sexual activity: Not on file Lifestyle  Physical activity Days per week: Not on file Minutes per session: Not on file  Stress: Not on file Relationships  Social connections Talks on phone: Not on file Gets together: Not on file Attends Protestant service: Not on file Active member of club or organization: Not on file Attends meetings of clubs or organizations: Not on file Relationship status: Not on file  Intimate partner violence Fear of current or ex partner: Not on file Emotionally abused: Not on file Physically abused: Not on file Forced sexual activity: Not on file Other Topics Concern  Not on file Social History Narrative  Not on file ALLERGIES: Patient has no known allergies. Review of Systems Constitutional: Negative for fever. HENT: Negative for facial swelling and rhinorrhea. Eyes: Negative for pain and visual disturbance. Respiratory: Negative for cough and shortness of breath. Cardiovascular: Negative for chest pain and leg swelling. Gastrointestinal: Negative for abdominal pain, nausea and vomiting. Genitourinary: Negative for dysuria and hematuria. Musculoskeletal: Negative for back pain and neck pain. Neurological: Positive for weakness ( Chronic left-sided weakness and facial droop from prior CVA) and headaches. Negative for tingling, loss of consciousness and numbness. Psychiatric/Behavioral: Negative for agitation and confusion. Vitals:  
 05/17/20 1451 BP: 121/56 Pulse: 86 Resp: 20 Temp: 98.5 °F (36.9 °C) SpO2: 99% Weight: 64 kg (141 lb) Height: 5' 8\" (1.727 m) Physical Exam 
Vitals signs and nursing note reviewed. Constitutional:   
   Appearance: He is well-developed. HENT:  
   Head:  
   Comments: Left parietal area/temporal area contusion with mild tenderness Mouth/Throat:  
   Mouth: Mucous membranes are moist.  
   Pharynx: No oropharyngeal exudate. Eyes:  
   Conjunctiva/sclera: Conjunctivae normal.  
   Pupils: Pupils are equal, round, and reactive to light. Neck: Musculoskeletal: Neck supple. Cardiovascular:  
   Rate and Rhythm: Normal rate and regular rhythm. Heart sounds: Normal heart sounds. Pulmonary:  
   Effort: Pulmonary effort is normal.  
   Breath sounds: Normal breath sounds. Abdominal:  
   General: Bowel sounds are normal. There is no distension. Palpations: Abdomen is soft. Tenderness: There is no abdominal tenderness. There is no guarding or rebound. Musculoskeletal:     
   General: No tenderness. Comments: Shortening and external rotation of left lower extremity. Pain with internal and external rotation and attempts at flexion even just minimally. Pelvis is stable and nontender. No mid or distal femur knee tib-fib ankle or foot tenderness. Other extremities are nontender with the exception of the left upper arm and shoulder area but there is no deformity there. Clavicles nontender. Lymphadenopathy: Cervical: No cervical adenopathy. Skin: 
   General: Skin is warm and dry. Findings: No laceration. Neurological:  
   Mental Status: He is alert. Cranial Nerves: Cranial nerve deficit ( Per patient's report old left-sided mild facial weakness) present. Sensory: No sensory deficit. Motor: Weakness ( Old left-sided weakness worse in the left arm and left leg with contracture at the wrist and hand.) present. Comments: Motor intact sensory intact in left lower extremity. 1+ dorsalis pedis and posterior tibial pulses present. MDM Number of Diagnoses or Management Options Diagnosis management comments: Clinically highly suspicious and certain of the left hip fracture. Work-up initiated. Given patient is on Eliquis and there is some mismatch in the stories regarding head injury and loss of consciousness we will do a CT scan of the head. I will check a CK for rhabdo as well. Amount and/or Complexity of Data Reviewed Clinical lab tests: ordered and reviewed (Results for orders placed or performed during the hospital encounter of 05/17/20 
-CBC WITH AUTOMATED DIFF Result                      Value             Ref Range WBC                         10.5              4.3 - 11.1 K* 
     RBC                         3.40 (L)          4.23 - 5.6 M* HGB                         10.7 (L)          13.6 - 17.2 * HCT                         33.2 (L)          41.1 - 50.3 % MCV                         97.6              79.6 - 97.8 * MCH                         31.5              26.1 - 32.9 * MCHC                        32.2              31.4 - 35.0 * RDW                         14.0              11.9 - 14.6 % PLATELET                    249               150 - 450 K/* MPV                         12.4 (H)          9.4 - 12.3 FL ABSOLUTE NRBC               0.00              0.0 - 0.2 K/* DF                          AUTOMATED NEUTROPHILS                 77                43 - 78 % LYMPHOCYTES                 10 (L)            13 - 44 % MONOCYTES                   12                4.0 - 12.0 % EOSINOPHILS                 0 (L)             0.5 - 7.8 % BASOPHILS                   0                 0.0 - 2.0 % IMMATURE GRANULOCYTES       0                 0.0 - 5.0 %   
     ABS. NEUTROPHILS            8.1               1.7 - 8.2 K/* ABS. LYMPHOCYTES            1.0               0.5 - 4.6 K/* ABS. MONOCYTES              1.3               0.1 - 1.3 K/* ABS. EOSINOPHILS            0.0               0.0 - 0.8 K/* ABS. BASOPHILS              0.0               0.0 - 0.2 K/* ABS. IMM. GRANS.            0.0               0.0 - 0.5 K/* 
-METABOLIC PANEL, COMPREHENSIVE Result                      Value             Ref Range Sodium                      143               136 - 145 mm* Potassium                   5.8 (H)           3.5 - 5.1 mm* Chloride                    108 (H)           98 - 107 mmo* CO2                         27                21 - 32 mmol* Anion gap                   8                 7 - 16 mmol/L Glucose                     178 (H)           65 - 100 mg/* BUN                         45 (H)            8 - 23 MG/DL Creatinine                  2.51 (H)          0.8 - 1.5 MG* 
     GFR est AA                  32 (L)            >60 ml/min/1* GFR est non-AA              26 (L)            >60 ml/min/1* Calcium                     9.2               8.3 - 10.4 M* Bilirubin, total            0.5               0.2 - 1.1 MG* ALT (SGPT)                  28                12 - 65 U/L   
     AST (SGOT)                  30                15 - 37 U/L Alk.  phosphatase            78                50 - 136 U/L  
 Protein, total              6.6               6.3 - 8.2 g/* Albumin                     3.6               3.2 - 4.6 g/* Globulin                    3.0               2.3 - 3.5 g/* A-G Ratio                   1.2               1.2 - 3.5     
-PROTHROMBIN TIME + INR Result                      Value             Ref Range Prothrombin time            15.7 (H)          12.0 - 14.7 * INR                         1.2                             
-CK Result                      Value             Ref Range CK                          593 (H)           21 - 215 U/L  
-EKG, 12 LEAD, INITIAL Result                      Value             Ref Range Ventricular Rate            79                BPM           
     Atrial Rate                 104               BPM           
     QRS Duration                86                ms Q-T Interval                368               ms            
     QTC Calculation (Bezet)     421               ms            
     Calculated R Axis           76                degrees Calculated T Axis           79                degrees Diagnosis                                                   
 !! AGE AND GENDER SPECIFIC ECG ANALYSIS !! Atrial fibrillation Septal infarct (cited on or before 07-NOV-2015) Abnormal ECG When compared with ECG of 31-JAN-2020 16:12, 
 Criteria for Lateral infarct are no longer Present -TYPE & SCREEN Result                      Value             Ref Range Crossmatch Expiration       05/20/2020 ABO/Rh(D)                   A POSITIVE Antibody screen             NEG                             
) 
Tests in the radiology section of CPT®: ordered and reviewed (Xr Shoulder Lt Ap/lat Min 2 V Result Date: 5/17/2020 Left shoulder series HISTORY: Pain after fall. Comparison: None FINDINGS: 3 views were obtained. There is an acute, comminuted fracture of the proximal left humeral metadiaphysis. There is mild impaction and slight medial displacement of the dominant distal fracture fragment. The acromioclavicular joint is intact. There is no bony destruction. IMPRESSION: Left proximal humeral metadiaphyseal fracture. Xr Humerus Lt Result Date: 5/17/2020 Left humerus HISTORY: Pain after fall. 2 views of the left humerus were obtained. No prior studies are available for comparison. FINDINGS: There is an acute fracture of the proximal humeral metadiaphysis. There is mild impaction and slight medial displacement of the distal fracture fragment. Vascular calcifications are present. There is no bony destruction. IMPRESSION: Mildly displaced and impacted left proximal humeral metadiaphyseal fracture. Xr Hip Lt W Or Wo Pelv 2-3 Vws Result Date: 5/17/2020 Left hip, left femur radiographs HISTORY: Pain after fall. Left hip: 3 views of the left hip were obtained including an AP view of the pelvis. COMPARISON: None FINDINGS: There is a comminuted left intertrochanteric hip fracture. There is varus angulation of the dominant fracture fragments. Extensive vascular calcifications are present. There are postoperative changes overlying the left pelvis. There is medial displacement of the fracture fragment involving the lesser trochanter. The joint spaces well-preserved. There is no bony destruction. IMPRESSION: Comminuted left intertrochanteric hip fracture. Left femur: AP and lateral views of the left femur were obtained. No prior studies are available for comparison. FINDINGS: No additional acute fracture is identified. Vascular calcifications are present. There is no bony destruction. IMPRESSION: No additional acute fracture identified. Xr Femur Lt 2 V Result Date: 5/17/2020 Left hip, left femur radiographs HISTORY: Pain after fall. Left hip: 3 views of the left hip were obtained including an AP view of the pelvis. COMPARISON: None FINDINGS: There is a comminuted left intertrochanteric hip fracture. There is varus angulation of the dominant fracture fragments. Extensive vascular calcifications are present. There are postoperative changes overlying the left pelvis. There is medial displacement of the fracture fragment involving the lesser trochanter. The joint spaces well-preserved. There is no bony destruction. IMPRESSION: Comminuted left intertrochanteric hip fracture. Left femur: AP and lateral views of the left femur were obtained. No prior studies are available for comparison. FINDINGS: No additional acute fracture is identified. Vascular calcifications are present. There is no bony destruction. IMPRESSION: No additional acute fracture identified. Ct Head Wo Cont Result Date: 5/17/2020 CT head without contrast History: Pain after fall. Technique: 5mm axial images were obtained from the skull base to the vertex without contrast. Radiation dose reduction techniques were used for this study: Our CT scanners use one or all of the following: Automated exposure control, adjustment of the mA and/or kVp according to patient's size, iterative reconstruction. Comparison: 01/31/2020 Findings: There is a large remote infarction within the right MCA territory. The ventricles are normal in size. There are no extra-axial fluid collections. No evidence of acute intraparenchymal hemorrhage or mass effect is identified. Patchy areas of decreased attenuation are noted within the supratentorial white matter. These are nonspecific findings but would be most compatible with mild chronic small vessel ischemic changes. There is no evidence to suggest an acute major territorial infarct. The bony calvarium is intact.  The visualized mastoid air cells and paranasal sinuses are well pneumatized and aerated. Impression: 1. Findings most compatible with mild chronic small vessel ischemic changes. 2. Remote right MCA territory infarct. 3. No evidence of acute intracranial abnormality. Xr Chest [place] Result Date: 5/17/2020 Portable chest: History: fall. Left shoulder pain. Comparison: 11/25/2018 Findings: A single view of the chest was obtained at 1629 hours. The cardiac silhouette is at the upper limits of normal. The lungs and pleural spaces are clear. The pulmonary vascularity is within normal limits. There is a proximal humeral metadiaphyseal fracture. Median sternotomy wires are present. Impression: 1. Left proximal humerus fracture. 2. Postoperative changes. ) Procedures

## 2020-05-17 NOTE — ED NOTES
TRANSFER - OUT REPORT: 
 
Verbal report given to Al, rn on Latanya Armenta  being transferred to [address] [place] [address] for routine progression of care Report consisted of patients Situation, Background, Assessment and  
Recommendations(SBAR). Information from the following report(s) SBAR was reviewed with the receiving nurse. Lines:  
Peripheral IV 05/17/20 Right Antecubital (Active) Site Assessment Clean, dry, & intact 5/17/2020  3:46 PM  
  
 
Opportunity for questions and clarification was provided. Patient transported with: 
 [employer]

## 2020-05-17 NOTE — ED TRIAGE NOTES
Patient arrived via EMS from private home. Patient fell in the shower yesterday. Noted shortening and rotation to left leg. Patient c/o of left shoulder pain. Wife reported to EMS that patient did not hit head and no LOC. Patient is on Eliquis blood thinner. A+O x4. /70, HR 60bpm, o2 sat 98% on RA.

## 2020-05-17 NOTE — CONSULTS
[facility] Consultation Note Patient ID: 
Myah Bundy 
[id number] 
73 y.o. 
1934 Date of Consultation:  May 17, 2020 Referring Physician:  Emergency Department Subjective: Pt complains of left hip pain and left upper arm pain. This started today after he fell getting out of the shower. Per the pt, he has a PMH of stroke with residual left sided deficit. He reports he was in the shower when he was tossing something in the trash when he slipped and fell. He denies any injury to his head/neck. . The patient then  presented to the Emergency Department. Past Medical History Includes:  
Past Medical History:  
Diagnosis Date  Arrhythmia   
 reason for current procedure  Arthritis   
 osteo  CAD (coronary artery disease) 1999 CABG no stents  Carotid artery stenosis without cerebral infarction 2016  Chronic pain   
 arthritic  Claudication ([address]  Coronary atherosclerosis of native coronary artery 2016  GERD (gastroesophageal reflux disease)   
 controlled with meds  Hyperlipidemia  Hypertension   
 controlled with meds  Kidney stones   
 hx of  
 MI (myocardial infarction) ([address] 1999  PAF (paroxysmal atrial fibrillation) ([address]  Stroke ([address]  Throm[facility]embolus ([facility]   
,  
Past Surgical History:  
Procedure Laterality Date  [address] [place] [address] UNLISTED  ? hernia repair  CABG, ARTERY-VEIN, THREE  1999 CABG no stents  HX APPENDECTOMY  age 15  
 HX HEENT  12's?  
 left cataract  HX PTCA Family History:  
Family History Problem Relation Age of Onset  Coronary Artery Disease Other   
     family history Social History:  
Social History Tobacco Use  Smoking status: Former Smoker Packs/day: 1.50 Years: 35.00 Pack years: 52.50 Last attempt to quit: 10/1/1986 Years since quittin.6  Smokeless tobacco: Never Used  Tobacco comment: quit  Substance Use Topics  Alcohol use: Yes Alcohol/week: 6.7 standard drinks Types: 7 Glasses of wine, 1 Shots of liquor per week Comment: minimal  
 
 
ALLERGIES: No Known Allergies Patient Medications  @MAR@ Review of Systems:  A comprehensive ROS was completed and all musculoskeletal issues were addressed. Objective:VITALS:  
Patient Vitals for the past 8 hrs: 
 BP Temp Pulse Resp SpO2 Height Weight 20 1800 111/56        
20 1451 121/56 98.5 °F (36.9 °C) 86 20 99 % 5' 8\" (1.727 m) 64 kg (141 lb) , Temp (24hrs), Av.5 °F (36.9 °C), Min:98.5 °F (36.9 °C), Max:98.5 °F (36.9 °C) General: NAD, Alert, Oriented to person and place but not time (month or day), Appears their stated age HEENT: NC/AT Psych: normal affect Heart: Regular Rate, Rhythm Lungs: unlabored respirations, normal breath sounds Abdomen: Soft and non-distended Ortho: TTP over left hip region diffusely, no abrasion. Left leg shortened and externally rotated. Able to wiggle toes and flex left knee. Sensation to left leg and arm intact. Good radial pulse. Unable to palpate DP or PT pulses. Noted likely vascular scars to lower left leg. Left foot slightly cool to touch. Neuro: left sided facial droop. sensation is equal bilaterally Lymph: no lymphadenopathy X-ray:  
 
Left hip, left femur radiographs 
  
HISTORY: Pain after fall.  
  
Left hip: 3 views of the left hip were obtained including an AP view of the 
pelvis. 
  
COMPARISON: None 
  
FINDINGS: There is a comminuted left intertrochanteric hip fracture. There is 
varus angulation of the dominant fracture fragments. Extensive vascular 
calcifications are present. There are postoperative changes overlying the left 
pelvis. There is medial displacement of the fracture fragment involving the 
lesser trochanter. The joint spaces well-preserved.  There is no bony 
destruction. 
  
IMPRESSION 
 IMPRESSION: Comminuted left intertrochanteric hip fracture. 
  
Left femur: AP and lateral views of the left femur were obtained. No prior 
studies are available for comparison. 
  
FINDINGS: No additional acute fracture is identified. Vascular calcifications 
are present. There is no bony destruction. 
  
IMPRESSION: No additional acute fracture identified. Left humerus 
  
HISTORY: Pain after fall. 
  
2 views of the left humerus were obtained. No prior studies are available for 
comparison. 
  
FINDINGS: There is an acute fracture of the proximal humeral metadiaphysis. There is mild impaction and slight medial displacement of the distal fracture 
fragment. Vascular calcifications are present. There is no bony destruction. 
  
IMPRESSION IMPRESSION: Mildly displaced and impacted left proximal humeral metadiaphyseal 
fracture. Assessment/Plan:  
Patient Active Problem List  
Diagnosis Code  CAD (coronary artery disease) I25.10  
 S/P PTCA (percutaneous transluminal coronary angioplasty) Z98.61  
 Hypertension I10  
 PAF [facility]oxysmal [facility] fibrillation) ([facility] I48.0  Dyslipidemia E78.5  Coronary atherosclerosis of native coronary artery I25.10  Carotid artery stenosis without cerebral infarction I65.29  
 Stroke [facility]) I63.9  TIA (transient ischemic attack) G45.9  CVA (cerebral vascular accident) ([facility] I63.9  Closed left hip fracture, initial encounter ([facility] S72.002A  TERRY (acute kidney injury) ([facility] N17.9  Hyperkalemia E87.5  Closed fracture of left shoulder S42. 92XA  
 
 
-Discussed XRAY findings with Dr. Cinthya Hassan.  
-Pt has 2 part left  proximal humerus fracture and 4 part left femoral neck/trochanteric fracture 
-recommend left arm sling for humerus fx till surgical fixation can be arranged. -recommend Link's traction for Left leg for fracture, this will need surgical fixation as well. 
-will order sling and traction.  
-continue pain control. -lack of left arm  
-NPO for now, plan for likely surgical fixation tomorrow with Dr. Olen Sandhoff.

## 2020-05-18 PROBLEM — J95.821 RESPIRATORY FAILURE, POST-OPERATIVE (HCC): Status: ACTIVE | Noted: 2020-01-01

## 2020-05-18 NOTE — CONSULTS
Nephrology consult Admission Date: 
5/17/2020 Admission Diagnosis Closed left hip fracture, initial encounter ([facility] Pernell Soriano We are asked by Dr. Ayesha Najera 
 
History of Present Illness: Mr. Jeannette Gamboa is a 80 y.o male with PMH significant for A Fib on Eliquis, CVA, CAD, GERD, HTN and HLD admitted after reportedly falling when reaching for towel from shower found to have left hip and shoulder fracture with plan for surgical fixation per ortho today, Head CT w/o contrast mild chronic small vessel ischemic changes, remote right MCA territory infarctwe are consulted for TERRY. From a renal standpoint his creatinine on admission was 2.51->3.71, K 6.3->5.5 baseline Cr 1.0-1.4, ->1235, UA with protein and trace ketone, renal US right atrophic kidney, non obstructing 1.5 cm stone in the right upper pole. SBP in the 90s-100s, Home meds significant for ACEi. Patient seen and examined on rounds he is lethargic but arousable, reports dizziness and lightheadedness prior to falling in the shower, denies UOP changes, dysuria, N/V/D, edema, with ok appetite. Does not answer regarding NSAID use, pain controlled. Past Medical History:  
Diagnosis Date  Arrhythmia   
 reason for current procedure  Arthritis   
 osteo  CAD (coronary artery disease) 12/1999 CABG no stents  Carotid artery stenosis without cerebral infarction 1/18/2016  Chronic pain   
 arthritic  Claudication ([facility]  Coronary atherosclerosis of native coronary artery 1/18/2016  GERD (gastroesophageal reflux disease)   
 controlled with meds  Hyperlipidemia  Hypertension   
 controlled with meds  Kidney stones   
 hx of  
 MI (myocardial infarction) ([address] 12/1999  PAF (paroxysmal atrial fibrillation) ([address]  Stroke ([facility]  Thromboembolus ([address] Past Surgical History:  
Procedure Laterality Date [address] [place] [address] UNLISTED  2000? hernia repair  CABG, ARTERY-VEIN, THREE  12/1999 CABG no stents  HX APPENDECTOMY  age 15  
 HX HEENT  12's?  
 left cataract  HX PTCA Current Facility-Administered Medications Medication Dose Route Frequency  ceFAZolin (ANCEF) 2 g/20 mL in sterile water IV syringe  2 g IntraVENous ONCE  
 tuberculin injection 5 Units  5 Units IntraDERMal ONCE  
 oxyCODONE IR (ROXICODONE) tablet 10 mg  10 mg Oral Q4H PRN  
 sodium chloride (NS) flush 5-40 mL  5-40 mL IntraVENous Q8H  
 sodium chloride (NS) flush 5-40 mL  5-40 mL IntraVENous PRN  
 ondansetron (ZOFRAN) injection 4 mg  4 mg IntraVENous Q4H PRN  
 amLODIPine (NORVASC) tablet 5 mg  5 mg Oral DAILY  escitalopram oxalate (LEXAPRO) tablet 20 mg  20 mg Oral DAILY  aspirin chewable tablet 81 mg  81 mg Oral DAILY  acetaminophen (TYLENOL) tablet 1,000 mg  1,000 mg Oral TID  sodium bicarbonate (8.4%) 100 mEq in dextrose 5% 1,000 mL infusion   IntraVENous CONTINUOUS No Known Allergies Social History Tobacco Use  Smoking status: Former Smoker Packs/day: 1.50 Years: 35.00 Pack years: 52.50 Last attempt to quit: 10/1/1986 Years since quittin.6  Smokeless tobacco: Never Used  Tobacco comment: quit  Substance Use Topics  Alcohol use: Yes Alcohol/week: 6.7 standard drinks Types: 7 Glasses of wine, 1 Shots of liquor per week Comment: minimal  
  
Family History Problem Relation Age of Onset  Coronary Artery Disease Other   
     family history Review of Systems Gen - no fever, no chills, appetite okay HEENT - no sore throat CV - no chest pain, no palpitation, no orthopnea Lung - no shortness of breath, no cough, no hemoptysis Abd - no tenderness, no nausea/vomiting, no bloody stool Ext - no edema, no clubbing, no cyanosis Musculoskeletal - left sided + joint pain, no back pain Neurologic - no headaches, + dizziness, no seizures Psychiatric - no anxiety, no depression Skin - no rashes Genitourinary - no decreased urine output, no hematuria, no dysuria Objective:  
 
Vitals:  
 05/17/20 1915 05/17/20 2259 05/18/20 0742 05/18/20 1139 BP: 95/54 104/55 106/60 94/55 Pulse:  89 82 87 Resp:  19 17 18 Temp:  97.6 °F (36.4 °C) 97.5 °F (36.4 °C) 98.2 °F (36.8 °C) SpO2:  94% 94% 95% Weight:      
Height:      
 
 
Intake/Output Summary (Last 24 hours) at 5/18/2020 1149 Last data filed at 5/18/2020 7286 Gross per 24 hour Intake  Output 200 ml Net -200 ml Physical Exam 
GEN :in no distress, alert and oriented to person and place HEENT: anicteric sclerae,   Mucous membranes are moist. 
Neck - supple without JVD, no thyromegaly. No lymphadenopathy. CV - irregularly irregular, + murmur, no rub Lung - clear bilaterally, lungs expand symmetrically Abd - soft, nontender, bowel sounds present, no hepatosplenomegaly Ext - no clubbing, no cyanosis, no edema Neurologic - deferred, lethargic Genitourinary - bladder nonpalpable, phipps Skin - no rashes, no purpura Psychiatric: deferred, lethargic Data Review:  
Recent Labs 05/18/20 
0431 05/17/20 
1520 WBC 12.5* 10.5 HGB 9.1* 10.7* HCT 28.2* 33.2*  
 249 Recent Labs 05/18/20 
0431 05/17/20 
2126 05/17/20 
1520   --  143  
K 5.5* 6.3* 5.8*  
  --  108* CO2 24  --  27  
BUN 58*  --  45* CREA 3.71*  --  2.51* *  --  178* CA 8.5  --  9.2 No results for input(s): PH, PCO2, PO2, PCO2 in the last 72 hours. Problem List:  
 
Patient Active Problem List  
 Diagnosis Date Noted  Closed left hip fracture, initial encounter ([facility] 05/17/2020  TERRY (acute kidney injury) ([facility] 05/17/2020  Hyperkalemia 05/17/2020  Closed fracture of left shoulder 05/17/2020  CVA (cerebral vascular accident) ([facility] 02/02/2020  TIA (transient ischemic attack) 01/31/2020  Stroke ([facility] 09/14/2017  Coronary atherosclerosis of native coronary artery 01/18/2016  Carotid artery stenosis without cerebral infarction 01/18/2016  CAD (coronary artery disease) 10/06/2015  S/P PTCA (percutaneous transluminal coronary angioplasty) 10/06/2015  Hypertension 10/06/2015  PAF (paroxysmal atrial fibrillation) ([facility] 10/06/2015  Dyslipidemia 10/06/2015 Impression: 
 
Plan: 1. TERRY likely 2/2 pre renal azotemia with hypotension, dizziness, atrophic right kidney with non obstructing stone, no hydronephrosis 
-obtain protein/creatinine ratio, Urine Cr, Osmel, recheck CK in AM 
-hold ACEi, avoid nephrotoxins 
-continue NaHCO2 gtts change IVF to NS with CO2 >32 
-monitor renal function with strict I&O, no indication for acute RRT at this time 3. Hyperkalemia- s/p insulin, D50, calcium gluconate, repeat K per primary 4. A fib- rate controlled, Eliquis on hold per primary 5. Left hip and shoulder fracture for internal fixation today per ortho 6. Anemia 7. Leukocytosis- on ancef

## 2020-05-18 NOTE — PROGRESS NOTES
OT Note: OT orders received. Patient is s/p L hip fracture and is slated for L hip surgery this PM.  Plan to hold OT currently and await new orders following surgery.

## 2020-05-18 NOTE — PROGRESS NOTES
Problem: Patient Education: Go to Patient Education Activity Goal: Patient/Family Education Outcome: Progressing Towards Goal 
  
Problem: Patient Education: Go to Patient Education Activity Goal: Patient/Family Education Outcome: Progressing Towards Goal

## 2020-05-18 NOTE — PROGRESS NOTES
Bedside shift change report given to JULIO C Fowler (oncoming nurse) by Lennox Moros, RN (offgoing nurse). Report included the following information SBAR, Kardex, ED Summary, OR Summary, Procedure Summary, Intake/Output, MAR, Recent Results, Med Rec Status and Cardiac Rhythm AFib .

## 2020-05-18 NOTE — PROGRESS NOTES
I have spoken to the patient regarding his injuries. He has a left proximal humerus fracture as well as a left proximal femur fracture. I do think that both of these would likely best be treated with operative intervention. The plan will be to proceed with cephalo-medullary nail fixation of the left proximal femur fracture and intramedullary nail fixation of the left proximal humerus fracture. Plan be to proceed with this today.

## 2020-05-18 NOTE — PROGRESS NOTES
Received call from spouse asked to make referral to [address] [place] [address] and [facility], referral made to [address] [place] [address] via [address] link will await response, called to [facility] spoke with admissions Lowell Wautoma [phone] she states not sure if they are taking pts at this time she was waiting another staff member who is in a meeting to see if they are on admission hold or if they will be able to take pt. No referral sent at this time. States she will return call to  shortly.

## 2020-05-18 NOTE — OP NOTES
Operative Report Patient: Barbi Hammer MRN: [id number]  SSN: [id number] YOB: 1934  Age: 80 y.o. Sex: male Date of Surgery: 5/18/2020 History:  Barbi Hammer is a 80 y.o. male who fell and injured both his left shoulder as well as his left hip. He was found as well as a left peritrochanteric/intertrochanteric proximal femur fracture. Both of these were closed and displaced. I did have a chance to talk to the patient and we did have a chance to talk to the family regarding the need for operative fixation. The plan was tentatively to fix the left hip fracture and perhaps fix the left humerus fracture if we felt he was tolerating anesthesia well as I did think it would be in his best interest to be able to use his left upper extremity to assist in using a walker. He does have a history of having a stroke that has affected his left side to some degree. He was able to tell me however preoperatively that he did use his left hand to use a walker. I talked to the patient and/or their representative and explained the exact nature the procedure. I also went through a detailed list of the material risks associated with  the procedure which included risk of bleeding, infection, injury to nearby structures, worsening the situation, as well as the risks associate with anesthesia and finally death. Also talked with him regarding the benefits and alternatives to the procedure. Preoperative Diagnosis: LEFT HIP FX AND LEFT FEMUR FX  
 
Postoperative Diagnosis: 1-Left intertrochanteric femur fracture /left closed displaced intertrochanteric/peritrochanteric proximal femur fracture, #2left closed displaced surgical neck proximal humerus fracture Surgeon(s) and Role: * Mima Zurita MD - Primary Anesthesia: Spinal  
 
Procedure: Procedure(s): 
#1open treatment of left proximal femur peritrochanteric fracture with intramedullary nail fixation, #2closed treatment without manipulation left proximal humerus fracture Procedure in Detail: After successful  induction of general anesthetic, the patient did have some emesis while being intubated and the anesthesia team was a little concerned about this. He also showed significant signs of chronic changes in his left upper extremity once he was under general anesthesia with significant contracture of his left hand. As result of these 2 things I really felt like it was not likely that he was going to be able to use a walker very much to help with his rehab and I did think that it was in his best interest to limit the amount and time of anesthesia so at that time I elected to just treat the femur operatively and to treat the proximal humerus nonoperatively. Again I felt this was in his best overall interest.  Then  the left lower extremity was placed in gentle boot traction since it was nondisplaced on a fracture table and we made sure we could adequately visualize the proximal femur and that an adequate closed reduction could be obtained. I then prepped and draped the left hip and thigh area and made a small incision just proximal area the greater trochanter placed the cannulated awl the tip the greater trochanter on both the AP and lateral projection and once it was in appropriate position placed a guidewire down the shaft of the femur and then reamed to 13 mm for the shaft of the femur and to 15.5 mm proximally. I then measured for a 420 mm nail and placed the actual nail. Once the nail was in appropriate position I placed a guidewire in the center of the femoral head on both the AP and lateral projection. Once this was in appropriate position I drilled for and placed a 105 mm lag screw proximally.   Once this was then position I then placed a set screw in the proximal portion of the nail and tightened this all the way down and backed off a half of a turn to allow for dynamic compression. I placed a single interlock bolt distally using freehand technique. I then removed the insertion handle and checked the final reduction as well as the placement of the hardware. I was very pleased with this I then closed incisions with two-point 0 Monocryl for the subcutaneous tissue and staples for the skin. Dressings were applied. The patient was awakened and taken recovery in stable condition there were no apparent complications Estimated Blood Loss: 90 cc Tourniquet Time: * No tourniquets in log * Implants:  
Implant Name Type Inv. Item Serial No.  Lot No. LRB No. Used Action NAIL KIP 125D [id number] -- Lupe Imam  NAIL KIP 125D [id number] -- Elridge Dawood [place] [id number] Left 1 Implanted SCR FEN 110MM GLD STRL -- TF[employer] - [id number]  SCR FEN 110MM GLD STRL -- [employer]  SYNTHES [place] [id number] Left 1 Implanted SCR BNE LCK T25 F/IM NL [id number] --  - [id number]  SCR BNE LCK T25 F/IM NL [id number] --   [address] [id number] Left 1 Implanted Specimens: * No specimens in log * Drains: None Complications: None Counts: Sponge and needle counts were correct times two. Signed By:  Yamil Chun MD   
 May 18, 2020

## 2020-05-18 NOTE — CONSULTS
CONSULT NOTE Aleja Dela Cruz 
 
5/18/2020 Date of Admission:  5/17/2020 The patient's chart is reviewed and the patient is discussed with the staff. Subjective: The patient is an 80 y.o.  male seen and evaluated at the request of Dr. Donna Houston for respiratory management post aspiration in the OR. The pt has a hx of afib, CVA, CAD, HTN, GERD who fell in the shower sustaining a L hip and shoulder fracture. He went to the OR today and apparently aspirated a moderate amount of fluid on induction. The pt was subsequently intubated and suctioned and lavaged by anesthesia. He had issues with oxygenation intraoperatively and was not felt a candidate for extubation post procedure. He only had his hip Fx repaired today. The shoulder was not done as was planned due to the respiratory issues. Apparently his sat was only in the 70's on 50% oxygen on arrival to the PACU and he was placed on 100% oxygen. He is not able to provide any history. Review of Systems Unable to obtain on the ventilator. Patient Active Problem List  
Diagnosis Code  CAD (coronary artery disease) I25.10  
 S/P PTCA (percutaneous transluminal coronary angioplasty) Z98.61  
 Hypertension I10  
 PAF (paroxysmal atrial fibrillation) (HCC) I48.0  Dyslipidemia E78.5  Coronary atherosclerosis of native coronary artery I25.10  Carotid artery stenosis without cerebral infarction I65.29  
 Stroke [facility]) I63.9  TIA (transient ischemic attack) G45.9  CVA (cerebral vascular accident) ([address] I63.9  Closed left hip fracture, initial encounter ([address] S72.002A  TERRY (acute kidney injury) ([address] N17.9  Hyperkalemia E87.5  Closed fracture of left shoulder S42. 92XA  Respiratory failure, post-operative ([address] H90.726 Prior to Admission Medications Prescriptions Last Dose Informant Patient Reported?  Taking?  
allopurinol (ZYLOPRIM) 100 mg tablet   Yes No  
 Sig: Take 100 mg by mouth daily. amLODIPine (NORVASC) 5 mg tablet   No No  
Sig: Take 1 Tab by mouth daily. apixaban (ELIQUIS) 2.5 mg tablet   No No  
Sig: Take 1 Tab by mouth two (2) times a day. aspirin 81 mg chewable tablet   Yes No  
Sig: Take 81 mg by mouth daily. atorvastatin (LIPITOR) 80 mg tablet   No No  
Sig: Take 0.5 Tabs by mouth daily. escitalopram oxalate (LEXAPRO) 10 mg tablet   Yes No  
Sig: Take 20 mg by mouth daily. lisinopril (PRINIVIL, ZESTRIL) 40 mg tablet   No No  
Sig: Take 1 Tab by mouth daily. nitroglycerin (NITROSTAT) 0.4 mg SL tablet   No No  
Si Tab by SubLINGual route every five (5) minutes as needed for Chest Pain. Facility-Administered Medications: None Past Medical History:  
Diagnosis Date  Arrhythmia   
 reason for current procedure  Arthritis   
 osteo  CAD (coronary artery disease) 1999 CABG no stents  Carotid artery stenosis without cerebral infarction 2016  Chronic pain   
 arthritic  Claudication ([facility]  Coronary atherosclerosis of native coronary artery 2016  GERD (gastroesophageal reflux disease)   
 controlled with meds  Hyperlipidemia  Hypertension   
 controlled with meds  Kidney stones   
 hx of  
 MI (myocardial infarction) ([facility] 1999  PAF (paroxysmal atrial fibrillation) ([facility]  Stroke ([facility]  Thromboembolus ([facility] Past Surgical History:  
Procedure Laterality Date  [address] [place] [address] UNLISTED  ? hernia repair  CABG, ARTERY-VEIN, THREE  1999 CABG no stents  HX APPENDECTOMY  age 15  
 HX HEENT  12's?  
 left cataract  HX PTCA Social History Socioeconomic History  Marital status:  Spouse name: Not on file  Number of children: Not on file  Years of education: Not on file  Highest education level: Not on file Occupational History  Not on file Social Needs  Financial resource strain: Not on file  Food insecurity Worry: Not on file Inability: Not on file  Transportation needs Medical: Not on file Non-medical: Not on file Tobacco Use  Smoking status: Former Smoker Packs/day: 1.50 Years: 35.00 Pack years: 52.50 Last attempt to quit: 10/1/1986 Years since quittin.6  Smokeless tobacco: Never Used  Tobacco comment: quit  Substance and Sexual Activity  Alcohol use: Yes Alcohol/week: 6.7 standard drinks Types: 7 Glasses of wine, 1 Shots of liquor per week Comment: minimal  
 Drug use: No  
  Types: Prescription, OTC  Sexual activity: Not on file Lifestyle  Physical activity Days per week: Not on file Minutes per session: Not on file  Stress: Not on file Relationships  Social connections Talks on phone: Not on file Gets together: Not on file Attends Sabianist service: Not on file Active member of club or organization: Not on file Attends meetings of clubs or organizations: Not on file Relationship status: Not on file  Intimate partner violence Fear of current or ex partner: Not on file Emotionally abused: Not on file Physically abused: Not on file Forced sexual activity: Not on file Other Topics Concern  Not on file Social History Narrative  Not on file Family History Problem Relation Age of Onset  Coronary Artery Disease Other   
     family history No Known Allergies Current Facility-Administered Medications Medication Dose Route Frequency  lidocaine (XYLOCAINE) 10 mg/mL (1 %) injection 0.3 mL  0.3 mL SubCUTAneous ONCE  
 lactated Ringers infusion  100 mL/hr IntraVENous CONTINUOUS  
 oxyCODONE IR (ROXICODONE) tablet 10 mg  10 mg Oral ONCE PRN  
 HYDROmorphone (PF) (DILAUDID) injection 0.5 mg  0.5 mg IntraVENous Multiple  promethazine (PHENERGAN) with saline injection 6.25 mg  6.25 mg IntraVENous Q15MIN PRN  
  tuberculin injection 5 Units  5 Units IntraDERMal ONCE  
 oxyCODONE IR (ROXICODONE) tablet 10 mg  10 mg Oral Q4H PRN  
 0.9% sodium chloride infusion 250 mL  250 mL IntraVENous PRN  propofol (DIPRIVAN) 10 mg/mL infusion  0-50 mcg/kg/min IntraVENous TITRATE  sodium chloride (NS) flush 5-40 mL  5-40 mL IntraVENous Q8H  
 sodium chloride (NS) flush 5-40 mL  5-40 mL IntraVENous PRN  
 ondansetron (ZOFRAN) injection 4 mg  4 mg IntraVENous Q4H PRN  
 amLODIPine (NORVASC) tablet 5 mg  5 mg Oral DAILY  escitalopram oxalate (LEXAPRO) tablet 20 mg  20 mg Oral DAILY  aspirin chewable tablet 81 mg  81 mg Oral DAILY  acetaminophen (TYLENOL) tablet 1,000 mg  1,000 mg Oral TID Objective:  
 
Vitals:  
 05/18/20 1823 05/18/20 1837 05/18/20 1853 05/18/20 1908 BP: 117/53 106/51 92/45 (!) 88/53 Pulse: 100 99 94 98 Resp: 26 26 Temp:      
SpO2: 99% 98% 100% 100% Weight:      
Height: PHYSICAL EXAM  
 
Constitutional:  the patient is well developed and in no acute distress EENMT:  Sclera clear, pupils equal, oral mucosa moist 
Respiratory: fairly clear bilaterally Cardiovascular:  iRR without M,G,R 
Gastrointestinal: soft and non-tender; with positive bowel sounds. Musculoskeletal: warm without cyanosis. There is no lower extremity edema. Skin:  no jaundice or rashes Neurologic: no gross neuro deficits Psychiatric:  sedated CXR:   
 
 
 
 
Recent Labs 05/18/20 
1715 05/18/20 
0431 05/17/20 
1520 WBC 5.2 12.5* 10.5 HGB 8.1* 9.1* 10.7* HCT 26.4* 28.2* 33.2*  
 195 249 INR  --   --  1.2 Recent Labs 05/18/20 
0431 05/17/20 
2126 05/17/20 
1520   --  143  
K 5.5* 6.3* 5.8*  
  --  108* *  --  178* CO2 24  --  27  
BUN 58*  --  45* CREA 3.71*  --  2.51* CA 8.5  --  9.2 ALB  --   --  3.6 TBILI  --   --  0.5 ALT  --   --  28 SGOT  --   --  30 Recent Labs 05/18/20 
1744 PHI 7.250* PCO2I 55.8*  
PO2I 82  
 HCO3I 24.5 No results for input(s): LCAD, LAC in the last 72 hours. Assessment:  (Medical Decision Making) Hospital Problems  Never Reviewed Codes Class Noted POA Respiratory failure, post-operative ([facility] ICD-10-CM: U26.934 ICD-9-CM: 518.51  5/18/2020 Unknown Try to wean oxygen then vent as able. * (Principal) Closed left hip fracture, initial encounter [facility]) ICD-10-CM: I31.991D ICD-9-CM: 820.8  5/17/2020 Unknown Repaired today. TERRY (acute kidney injury) ([facility] ICD-10-CM: N17.9 ICD-9-CM: 584.9  5/17/2020 Unknown Today's creatinine up to 3.71 Hyperkalemia ICD-10-CM: E87.5 ICD-9-CM: 276.7  5/17/2020 Unknown Need to repeat. Closed fracture of left shoulder ICD-10-CM: S42. 92XA ICD-9-CM: 812.00  5/17/2020 Unknown Not repaired today. CAD (coronary artery disease) (Chronic) ICD-10-CM: I25.10 ICD-9-CM: 414.00  10/6/2015 Yes Hypertension (Chronic) ICD-10-CM: I10 
ICD-9-CM: 401.9  10/6/2015 Yes PAF (paroxysmal atrial fibrillation) (HCC) (Chronic) ICD-10-CM: I48.0 ICD-9-CM: 427.31  10/6/2015 Yes Overview Signed 4/26/2017 12:59 PM by Scott Camejo MD  
  [facility] vas 6 VR fairly controlled. Dyslipidemia (Chronic) ICD-10-CM: N14.1 ICD-9-CM: 272.4  10/6/2015 Yes Plan:  (Medical Decision Making) Transfer to PACU as ICU non covid patient. Wean oxygen then vent as able. Repeat BMP now. Check serial ABGs. Follow UOP. Albuterol for bronchodilation and K reduction. -- 
 
More than 50% of the time documented was spent in face-to-face contact with the patient and in the care of the patient on the floor/unit where the patient is located. Thank you very much for this referral.  We appreciate the opportunity to participate in this patient's care. Will follow along with above stated plan.  
 
Anish Bratnley MD 
 20.66

## 2020-05-18 NOTE — PROGRESS NOTES
Initial visit by  to convey care and concern and encourage patient that  services are available if desired. No needs were voiced during the visit. Chaplains remain available for support. Sapna Manuel MDiv Board Certified [employer]

## 2020-05-18 NOTE — PROGRESS NOTES
05/17/20 1912 Dual Skin Pressure Injury Assessment Dual Skin Pressure Injury Assessment WDL Second Care Provider (Based on [address] [place] [address]) Mahsa Almanzar RN Skin Integumentary Skin Integumentary (WDL) X Skin Color Ecchymosis (comment) (Scattered. ) Skin Condition/Temp Dry;Fragile; Warm 
(Thick, discolored toenails. ) Skin Integrity Scars (comment) (Scattered scars and moles. ) Turgor Non-tenting Hair Growth Present Varicosities Absent Wound Prevention and Protection Methods Orientation of Wound Prevention Posterior Location of Wound Prevention Sacrum/Coccyx Wound Offloading (Prevention Methods) Bed, pressure reduction mattress;Pillows;Repositioning Sacral area intact. Reddened toes on RLE. Capillary refill <3 seconds. Mucous membranes pink, moist, intact. Heels intact.

## 2020-05-18 NOTE — H&P (VIEW-ONLY)
CONSULT NOTE Gavino Saman 
 
5/18/2020 Date of Admission:  5/17/2020 The patient's chart is reviewed and the patient is discussed with the staff. Subjective: The patient is an 80 y.o.  male seen and evaluated at the request of Dr. Loretta Thomas for respiratory management post aspiration in the OR. The pt has a hx of afib, CVA, CAD, HTN, GERD who fell in the shower sustaining a L hip and shoulder fracture. He went to the OR today and apparently aspirated a moderate amount of fluid on induction. The pt was subsequently intubated and suctioned and lavaged by anesthesia. He had issues with oxygenation intraoperatively and was not felt a candidate for extubation post procedure. He only had his hip Fx repaired today. The shoulder was not done as was planned due to the respiratory issues. Apparently his sat was only in the 70's on 50% oxygen on arrival to the PACU and he was placed on 100% oxygen. He is not able to provide any history. Review of Systems Unable to obtain on the ventilator. Patient Active Problem List  
Diagnosis Code  CAD (coronary artery disease) I25.10  
 S/P PTCA (percutaneous transluminal coronary angioplasty) Z98.61  
 Hypertension I10  
 PAF (paroxysmal atrial fibrillation) (HCC) I48.0  Dyslipidemia E78.5  Coronary atherosclerosis of native coronary artery I25.10  Carotid artery stenosis without cerebral infarction I65.29  
 Stroke [facility]) I63.9  TIA (transient ischemic attack) G45.9  CVA (cerebral vascular accident) ([address] I63.9  Closed left hip fracture, initial encounter ([address] S72.002A  TERRY (acute kidney injury) ([address] N17.9  Hyperkalemia E87.5  Closed fracture of left shoulder S42. 92XA  Respiratory failure, post-operative ([address] O90.557 Prior to Admission Medications Prescriptions Last Dose Informant Patient Reported?  Taking?  
allopurinol (ZYLOPRIM) 100 mg tablet   Yes No  
 Sig: Take 100 mg by mouth daily. amLODIPine (NORVASC) 5 mg tablet   No No  
Sig: Take 1 Tab by mouth daily. apixaban (ELIQUIS) 2.5 mg tablet   No No  
Sig: Take 1 Tab by mouth two (2) times a day. aspirin 81 mg chewable tablet   Yes No  
Sig: Take 81 mg by mouth daily. atorvastatin (LIPITOR) 80 mg tablet   No No  
Sig: Take 0.5 Tabs by mouth daily. escitalopram oxalate (LEXAPRO) 10 mg tablet   Yes No  
Sig: Take 20 mg by mouth daily. lisinopril (PRINIVIL, ZESTRIL) 40 mg tablet   No No  
Sig: Take 1 Tab by mouth daily. nitroglycerin (NITROSTAT) 0.4 mg SL tablet   No No  
Si Tab by SubLINGual route every five (5) minutes as needed for Chest Pain. Facility-Administered Medications: None Past Medical History:  
Diagnosis Date  Arrhythmia   
 reason for current procedure  Arthritis   
 osteo  CAD (coronary artery disease) 1999 CABG no stents  Carotid artery stenosis without cerebral infarction 2016  Chronic pain   
 arthritic  Claudication ([facility]  Coronary atherosclerosis of native coronary artery 2016  GERD (gastroesophageal reflux disease)   
 controlled with meds  Hyperlipidemia  Hypertension   
 controlled with meds  Kidney stones   
 hx of  
 MI (myocardial infarction) ([facility] 1999  PAF (paroxysmal atrial fibrillation) ([facility]  Stroke ([facility]  Thromboembolus ([facility] Past Surgical History:  
Procedure Laterality Date  [address] [place] [address] UNLISTED  ? hernia repair  CABG, ARTERY-VEIN, THREE  1999 CABG no stents  HX APPENDECTOMY  age 15  
 HX HEENT  12's?  
 left cataract  HX PTCA Social History Socioeconomic History  Marital status:  Spouse name: Not on file  Number of children: Not on file  Years of education: Not on file  Highest education level: Not on file Occupational History  Not on file Social Needs  Financial resource strain: Not on file  Food insecurity Worry: Not on file Inability: Not on file  Transportation needs Medical: Not on file Non-medical: Not on file Tobacco Use  Smoking status: Former Smoker Packs/day: 1.50 Years: 35.00 Pack years: 52.50 Last attempt to quit: 10/1/1986 Years since quittin.6  Smokeless tobacco: Never Used  Tobacco comment: quit  Substance and Sexual Activity  Alcohol use: Yes Alcohol/week: 6.7 standard drinks Types: 7 Glasses of wine, 1 Shots of liquor per week Comment: minimal  
 Drug use: No  
  Types: Prescription, OTC  Sexual activity: Not on file Lifestyle  Physical activity Days per week: Not on file Minutes per session: Not on file  Stress: Not on file Relationships  Social connections Talks on phone: Not on file Gets together: Not on file Attends [employer] service: Not on file Active member of club or organization: Not on file Attends meetings of clubs or organizations: Not on file Relationship status: Not on file  Intimate partner violence Fear of current or ex partner: Not on file Emotionally abused: Not on file Physically abused: Not on file Forced sexual activity: Not on file Other Topics Concern  Not on file Social History Narrative  Not on file Family History Problem Relation Age of Onset  Coronary Artery Disease Other   
     family history No Known Allergies Current Facility-Administered Medications Medication Dose Route Frequency  lidocaine (XYLOCAINE) 10 mg/mL (1 %) injection 0.3 mL  0.3 mL SubCUTAneous ONCE  
 lactated Ringers infusion  100 mL/hr IntraVENous CONTINUOUS  
 oxyCODONE IR (ROXICODONE) tablet 10 mg  10 mg Oral ONCE PRN  
 HYDROmorphone (PF) (DILAUDID) injection 0.5 mg  0.5 mg IntraVENous Multiple  promethazine (PHENERGAN) with saline injection 6.25 mg  6.25 mg IntraVENous Q15MIN PRN  
  tuberculin injection 5 Units  5 Units IntraDERMal ONCE  
 oxyCODONE IR (ROXICODONE) tablet 10 mg  10 mg Oral Q4H PRN  
 0.9% sodium chloride infusion 250 mL  250 mL IntraVENous PRN  propofol (DIPRIVAN) 10 mg/mL infusion  0-50 mcg/kg/min IntraVENous TITRATE  sodium chloride (NS) flush 5-40 mL  5-40 mL IntraVENous Q8H  
 sodium chloride (NS) flush 5-40 mL  5-40 mL IntraVENous PRN  
 ondansetron (ZOFRAN) injection 4 mg  4 mg IntraVENous Q4H PRN  
 amLODIPine (NORVASC) tablet 5 mg  5 mg Oral DAILY  escitalopram oxalate (LEXAPRO) tablet 20 mg  20 mg Oral DAILY  aspirin chewable tablet 81 mg  81 mg Oral DAILY  acetaminophen (TYLENOL) tablet 1,000 mg  1,000 mg Oral TID Objective:  
 
Vitals:  
 05/18/20 1823 05/18/20 1837 05/18/20 1853 05/18/20 1908 BP: 117/53 106/51 92/45 (!) 88/53 Pulse: 100 99 94 98 Resp: 26 26 Temp:      
SpO2: 99% 98% 100% 100% Weight:      
Height: PHYSICAL EXAM  
 
Constitutional:  the patient is well developed and in no acute distress EENMT:  Sclera clear, pupils equal, oral mucosa moist 
Respiratory: fairly clear bilaterally Cardiovascular:  iRR without M,G,R 
Gastrointestinal: soft and non-tender; with positive bowel sounds. Musculoskeletal: warm without cyanosis. There is no lower extremity edema. Skin:  no jaundice or rashes Neurologic: no gross neuro deficits Psychiatric:  sedated CXR:   
 
 
 
 
Recent Labs 05/18/20 
1715 05/18/20 
0431 05/17/20 
1520 WBC 5.2 12.5* 10.5 HGB 8.1* 9.1* 10.7* HCT 26.4* 28.2* 33.2*  
 195 249 INR  --   --  1.2 Recent Labs 05/18/20 
0431 05/17/20 
2126 05/17/20 
1520   --  143  
K 5.5* 6.3* 5.8*  
  --  108* *  --  178* CO2 24  --  27  
BUN 58*  --  45* CREA 3.71*  --  2.51* CA 8.5  --  9.2 ALB  --   --  3.6 TBILI  --   --  0.5 ALT  --   --  28 SGOT  --   --  30 Recent Labs 05/18/20 
1744 PHI 7.250* PCO2I 55.8*  
PO2I 82  
 HCO3I 24.5 No results for input(s): LCAD, LAC in the last 72 hours. Assessment:  (Medical Decision Making) Hospital Problems  Never Reviewed Codes Class Noted POA Res[facility] failure, [facility] ICD-10-CM: V41.037 ICD-9-CM: 518.51  5/18/2020 Unknown Try to wean oxygen then vent as able. * (Principal) Closed left hip fracture, initial encounter [facility]) ICD-10-CM: N36.247V ICD-9-CM: 820.8  5/17/2020 Unknown Repaired today. TERRY (acute kidney injury) ([facility] ICD-10-CM: N17.9 ICD-9-CM: 584.9  5/17/2020 Unknown Today's creatinine up to 3.71 Hyperkalemia ICD-10-CM: E87.5 ICD-9-CM: 276.7  5/17/2020 Unknown Need to repeat. Closed fracture of left shoulder ICD-10-CM: S42. 92XA ICD-9-CM: 812.00  5/17/2020 Unknown Not repaired today. CAD (coronary artery disease) (Chronic) ICD-10-CM: I25.10 ICD-9-CM: 414.00  10/6/2015 Yes Hypertension (Chronic) ICD-10-CM: I10 
ICD-9-CM: 401.9  10/6/2015 Yes PAF (paroxysmal atrial fibrillation) (HCC) (Chronic) ICD-10-CM: I48.0 ICD-9-CM: 427.31  10/6/2015 Yes Overview Signed 4/26/2017 12:59 PM by Andre Uribe MD  
  [facility] 6 VR fairly controlled. Dyslipidemia (Chronic) ICD-10-CM: L10.0 ICD-9-CM: 272.4  10/6/2015 Yes Plan:  (Medical Decision Making) Transfer to PACU as ICU non covid patient. Wean oxygen then vent as able. Repeat BMP now. Check serial ABGs. Follow UOP. Albuterol for bronchodilation and K reduction. -- 
 
More than 50% of the time documented was spent in face-to-face contact with the patient and in the care of the patient on the floor/unit where the patient is located. Thank you very much for this referral.  We appreciate the opportunity to participate in this patient's care. Will follow along with above stated plan.  
 
Jose Arzate MD

## 2020-05-18 NOTE — PERIOP NOTES
TRANSFER - IN REPORT: 
 
Verbal report received from Al, RN on Sissy Dago  being received from 7th floor for ordered procedure Report consisted of patients Situation, Background, Assessment and  
Recommendations(SBAR). Information from the following report(s) SBAR and MAR was reviewed with the receiving nurse. Opportunity for questions and clarification was provided. Assessment will be completed upon patients arrival to unit and care assumed.

## 2020-05-18 NOTE — ANESTHESIA POSTPROCEDURE EVALUATION
Procedure(s): LEFT FEMUR INSERTION INTRA MEDULLARY NAIL. general 
 
Anesthesia Post Evaluation Multimodal analgesia: multimodal analgesia used between 6 hours prior to anesthesia start to PACU discharge Patient location during evaluation: PACU Level of consciousness: obtunded/minimal responses (sedated) Pain management: adequate Airway patency: patent Anesthetic complications: yes (emesis and aspiration) Cardiovascular status: acceptable Respiratory status: ETT, intubated and ventilator Hydration status: acceptable Post anesthesia nausea and vomiting:  none Vitals Value Taken Time BP 88/53 5/18/2020  7:08 PM  
Temp 36.8 °C (98.2 °F) 5/18/2020  5:58 PM  
Pulse 98 5/18/2020  7:08 PM  
Resp 26 5/18/2020  6:37 PM  
SpO2 100 % 5/18/2020  7:08 PM  
Vitals shown include unvalidated device data.

## 2020-05-18 NOTE — PROGRESS NOTES
Attempted to see pt, pt slow to answer questions, opted to call spouse at home pt states pt has needed assistance with adls since previous stroke, states needs to use a cane for ambulation but refused to do so, states she and pt live in 1 level home with  1 step to enter. Demographics, insurance and PCP confirmed. Spouse requested referral sent to [address] for STR, referrals placed in CC link awaiting response. PPD placed, PT/OT pending evals, pt pending surgery today CM will remain available Care Management Interventions PCP Verified by CM: Yes Transition of Care Consult (CM Consult): Discharge Planning, SNF Physical Therapy Consult: Yes Occupational Therapy Consult: Yes Current Support Network: Lives with Spouse, Own Home Confirm Follow Up Transport: Family The Plan for Transition of Care is Related to the Following Treatment Goals : rehab in an effort to regain strength and return to baseline functioning for a safe transition to home. The Patient and/or Patient Representative was Provided with a Choice of Provider and Agrees with the Discharge Plan?: Yes Name of the Patient Representative Who was Provided with a Choice of Provider and Agrees with the Discharge Plan: spouse Freedom of Choice List was Provided with Basic Dialogue that Supports the Patient's Individualized Plan of Care/Goals, Treatment Preferences and Shares the Quality Data Associated with the Providers?: Yes Discharge Location Discharge Placement: Skilled nursing facility

## 2020-05-18 NOTE — ANESTHESIA PREPROCEDURE EVALUATION
Relevant Problems No relevant active problems Anesthetic History Review of Systems / Medical History Patient summary reviewed and pertinent labs reviewed Pulmonary Neuro/Psych  
 
 
CVA (left sided weakness) TIA Cardiovascular Hypertension Valvular problems/murmurs (AS, FANG 1.2) Dysrhythmias : atrial fibrillation CAD and CABG Exercise tolerance: <4 METS 
  
GI/Hepatic/Renal 
Within defined limits Endo/Other Other Findings Physical Exam 
 
Airway Mallampati: II 
TM Distance: 4 - 6 cm Neck ROM: normal range of motion Mouth opening: Normal 
 
 Cardiovascular Rhythm: regular Rate: normal 
 
Murmur: Grade 2 Dental 
 
Dentition: Poor dentition Pulmonary Decreased breath sounds: bibasilar Abdominal 
 
 
 
 Other Findings Anesthetic Plan ASA: 4, emergent Anesthesia type: general 
 
 
 
 
Induction: Intravenous Anesthetic plan and risks discussed with: Patient

## 2020-05-18 NOTE — PERIOP NOTES
TRANSFER - OUT REPORT: 
 
Verbal report given to JULIO C Fowler on Bety Urias  being transferred to ICU overflow bay 9 for routine post - op Report consisted of patients Situation, Background, Assessment and  
Recommendations(SBAR). Information from the following report(s) OR Summary, Procedure Summary, Intake/Output and MAR was reviewed with the receiving nurse. Lines:  
Peripheral IV 05/17/20 Right Antecubital (Active) Site Assessment Clean, dry, & intact 5/18/2020  5:58 PM  
Phlebitis Assessment 0 5/18/2020  5:58 PM  
Infiltration Assessment 0 5/18/2020  5:58 PM  
Dressing Status Clean, dry, & intact 5/18/2020  5:58 PM  
Dressing Type Tape;Transparent 5/18/2020  5:58 PM  
Hub Color/Line Status Patent;Pink 5/18/2020  5:58 PM  
Action Taken Open ports on tubing capped 5/17/2020  7:12 PM  
Alcohol Cap Used No 5/18/2020  5:58 PM  
   
Peripheral IV 05/18/20 Right Forearm (Active) Site Assessment Clean, dry, & intact 5/18/2020  5:58 PM  
Phlebitis Assessment 0 5/18/2020  5:58 PM  
Infiltration Assessment 0 5/18/2020  5:58 PM  
Dressing Status Clean, dry, & intact 5/18/2020  5:58 PM  
Dressing Type Tape;Transparent 5/18/2020  5:58 PM  
Hub Color/Line Status Patent;Green 5/18/2020  5:58 PM  
Alcohol Cap Used No 5/18/2020  5:58 PM  
  
 
Opportunity for questions and clarification was provided. Patient transported with: 
 O2 @ 15 liters VTE prophylaxis orders have not been written for Bety Urias. 
 
Patient and family given floor number and nurses name. Family updated re: pt status after security code verified.

## 2020-05-18 NOTE — PROGRESS NOTES
Hospitalist Progress Note 2020 Admit Date: 2020  2:46 PM  
NAME: Paul Warren  
:  1934 MRN:  [id number] Attending: Kary Hemphill DO 
PCP:  Ada Alonso MD 
 
SUBJECTIVE:  
79 yo CM with past history of Afib (on Eliquis), prior CVA, CAD, GERd, HTN, and HLD presents via EMS from home. Patient fell while reaching for towel after finishing in the shower. His wife tried to help him to the ground. Patient did not bump his head nor did he loss consciousness or black out. At bedside, he currently denies fevers/chills, chest pain, shortness of breath, abdominal pain, nausea/vomiting, or diarrhea.  
  
ED Course: x-rays show left hip fracture and left shoulder fracture. Labs show SCr of 2.5. CT head negative for acute events, does show old infarction. K of 5.8. CK of 593. EKG shows Afib. Given Dilaudid and Zofran in ED. Orthopedic surgery consulted. Hospitalist called for admission. Interval History (): patient examined at bedside. No acute overnight events. Still having hip and shoulder pain. Otherwise, voices no concerns when directly asked. No fevers/chills, chest pain, shortness of breath, or abdominal pain. Review of Systems negative with exception of pertinent positives noted above PHYSICAL EXAM  
 
Visit Vitals BP 94/55 (BP 1 Location: Right arm, BP Patient Position: At rest) Pulse 87 Temp 98.2 °F (36.8 °C) Resp 18 Ht 5' 8\" (1.727 m) Wt 64 kg (141 lb) SpO2 95% BMI 21.44 kg/m² Temp (24hrs), Av °F (36.7 °C), Min:97.5 °F (36.4 °C), Max:98.5 °F (36.9 °C) Oxygen Therapy O2 Sat (%): 95 % (20 1139) O2 Device: Room air (20 1451) Intake/Output Summary (Last 24 hours) at 2020 1249 Last data filed at 2020 1366 Gross per 24 hour Intake  Output 200 ml Net -200 ml General:          Alert, cooperative, no distress, appears stated age. Head:               Normocephalic, without obvious abnormality, atraumatic. Nose:               Nares normal. No drainage or sinus tenderness. Lungs:             Clear to auscultation bilaterally. No Wheezing or Rhonchi. No rales. Heart:              Irregularly irregular rhythm Abdomen:        Soft, non-tender. Not distended. Bowel sounds normal.  
Extremities:     Left hip externally rotated and tender to palption Skin:                Texture, turgor normal. No rashes or lesions. Not Jaundiced Neurologic:      Alert and oriented x 3, no focal deficits ASSESSMENT Active Hospital Problems Diagnosis Date Noted  Closed left hip fracture, initial encounter [facility] [place] 05/17/2020  TERRY (acute kidney injury) ([facility] 05/17/2020  Hyperkalemia 05/17/2020  Closed fracture of left shoulder 05/17/2020  CAD (coronary artery disease) 10/06/2015  Dyslipidemia 10/06/2015  Hypertension 10/06/2015  PAF (paroxysmal atrial fibrillation) ([facility] 10/06/2015  
  chads vasc 6 Plan: # TERRY 
- worsening SCr from 2.5 to 3.7 
- increase in serum CK to 1200 
- strict I's/O's 
- ordered renal ultrasound 
- consult nephrology 
- continue on maintenance IVFs 
- hold home lisinopril 
- avoid IV contrast, morphine, NSAIDs, and nephrotoxic meds 
- monitor with routine AM labs 
  
# Left hip fx/left shoulder fx 
- orthopedic surgery consulted, going to OR today 
- pain management 
  
# Hyperkalemia 
- received D50/insulin and calcium gluconate in ED 
- likely due to TERRY 
- fluids, as above 
  
# Afib 
- hold home Eliquis due to TERRY 
- currently rate-controlled 
  
# HTN 
- hold lisinopril due to TERRY 
- continue amlodipine 
  
# HLD 
- hold home statin 
  
F/E/N: maintenance fluids, replete electrolytes as needed, cardiac diet and NPO after MN 
  
Ppx: heparin gtt for VTE 
  
Code Status: FULL CODE (discussed with patient at bedside) 
  
Disposition: pending clinical improvement with plan as above. PT/OT consults. PPD ordered.  Discussed with patient at bedside and with wife via phone. Will go to SNF at discharge. All questions answered. Signed By: Roscoe Og DO May 18, 2020

## 2020-05-19 PROBLEM — J80 ARDS (ADULT RESPIRATORY DISTRESS SYNDROME) (HCC): Status: ACTIVE | Noted: 2020-01-01

## 2020-05-19 PROBLEM — A41.9 SEPTIC SHOCK (HCC): Status: ACTIVE | Noted: 2020-01-01

## 2020-05-19 PROBLEM — T17.908A ASPIRATION INTO AIRWAY: Status: ACTIVE | Noted: 2020-01-01

## 2020-05-19 PROBLEM — R65.21 SEPTIC SHOCK (HCC): Status: ACTIVE | Noted: 2020-01-01

## 2020-05-19 NOTE — PROGRESS NOTES
Dr. Kelli Stanley evaluated pt. Vent settings adjusted. Labs ordered. Aware of output from OGT. To hold NGT medications.

## 2020-05-19 NOTE — PROGRESS NOTES
PT Note: 
Therapist is discontinuing physical therapy at this time due to decline in status. Please reorder PT when our services are again appropriate. Thank you. Darshana Vera, PT, DPT 
5/19/2020

## 2020-05-19 NOTE — INTERVAL H&P NOTE
Update History & Physical 
 
The Patient's consult from 5/18/20 was reviewed. There was a marked drop in BP with pt needing CVL. The surgical site was confirmed by the [address] [place] [address] [address] guidance. Plan:  Place [facility] Electronically signed by Shon Ray MD on 5/19/2020 at 2:33 AM

## 2020-05-19 NOTE — PROGRESS NOTES
Critical Care Daily Progress Note: 5/19/2020 Admission Date: 5/17/2020 The patient's chart is reviewed and the patient is discussed with the staff. The patient is an 80 y.o.  male seen and evaluated at the request of Dr. JOHNNIE Palumbo for respiratory management post aspiration in the OR.  
  
The pt has a hx of afib, CVA, CAD, HTN, GERD who fell in the shower sustaining a L hip and shoulder fracture. He went to the OR today and apparently aspirated a moderate amount of fluid on induction. The pt was subsequently intubated and suctioned and lavaged by anesthesia. He had issues with oxygenation intraoperatively and was not felt a candidate for extubation post procedure. He only had his hip Fx repaired today. The shoulder was not done as was planned due to the respiratory issues. Apparently his sat was only in the 70's on 50% oxygen on arrival to the PACU and he was placed on 100% oxygen. Subjective:  
 
Called to see pt for hypotension. BP dropped to 50/30. Levophed started. Remains in afib with CVR. No obvious EKG changes. Current Facility-Administered Medications Medication Dose Route Frequency  NOREPINephrine (LEVOPHED) 4 mg in 5% dextrose 250 mL infusion  0.5-30 mcg/min IntraVENous TITRATE  lidocaine (XYLOCAINE) 10 mg/mL (1 %) injection 0.3 mL  0.3 mL SubCUTAneous ONCE  
 lactated Ringers infusion  100 mL/hr IntraVENous CONTINUOUS  
 tuberculin injection 5 Units  5 Units IntraDERMal ONCE  
 oxyCODONE IR (ROXICODONE) tablet 10 mg  10 mg Oral Q4H PRN  
 0.9% sodium chloride infusion 250 mL  250 mL IntraVENous PRN  propofol (DIPRIVAN) 10 mg/mL infusion  0-50 mcg/kg/min IntraVENous TITRATE  
 0.9% sodium chloride infusion  150 mL/hr IntraVENous CONTINUOUS  
 albuterol (PROVENTIL VENTOLIN) nebulizer solution 2.5 mg  2.5 mg Nebulization QID RT  
 sodium chloride (NS) flush 5-40 mL  5-40 mL IntraVENous Q8H  
  sodium chloride (NS) flush 5-40 mL  5-40 mL IntraVENous PRN  
 ondansetron (ZOFRAN) injection 4 mg  4 mg IntraVENous Q4H PRN  
 amLODIPine (NORVASC) tablet 5 mg  5 mg Oral DAILY  escitalopram oxalate (LEXAPRO) tablet 20 mg  20 mg Oral DAILY  aspirin chewable tablet 81 mg  81 mg Oral DAILY  acetaminophen (TYLENOL) tablet 1,000 mg  1,000 mg Oral TID Review of Systems Unobtainable due to patient status. Objective:  
 
Vitals:  
 05/19/20 0002 05/19/20 0008 05/19/20 0022 05/19/20 0037 BP: (!) 85/48 93/53 94/50 90/46 Pulse: 94 98 92 97 Resp: 21 19 21 24 Temp:      
SpO2: 92% 93% 93% 96% Weight:      
Height:      
 
 
 
Intake/Output Summary (Last 24 hours) at 5/19/2020 0225 Last data filed at 5/19/2020 0025 Gross per 24 hour Intake 1300 ml Output 500 ml Net 800 ml Physical Exam:         
Constitutional:  intubated and mechanically ventilated. EENMT:  Sclera clear, pupils equal, oral mucosa moist 
Respiratory: Decreased BS; scattered rhonchi 
Cardiovascular:  iRR with no M,G,R; 
Gastrointestinal:  soft with no tenderness; positive bowel sounds present Musculoskeletal:  warm with no cyanosis, no lower extremity edema Skin:  no jaundice or ecchymosis Neurologic: no gross neuro deficits Psychiatric:  Opens eyes slightly LINES:   
ETT, PIV, phipps DRIPS:   
IVF, levophed just started. CXR:   
 
5/18: 
 
 
 
 
Ventilator Settings Mode FIO2 Rate Tidal Volume Pressure PEEP Pressure control  50 %       5 cm H2O  8 cm H20 Peak airway pressure: 20 cm H2O Minute ventilation: 12.4 l/min ABG:  
Recent Labs 05/18/20 
1744 PHI 7.250* PCO2I 55.8*  
PO2I 82 HCO3I 24.5 LAB Recent Labs 05/17/20 
2031 GLUCPOC 163* Recent Labs 05/18/20 
1715 05/18/20 
0431 05/17/20 
1520 WBC 5.2 12.5* 10.5 HGB 8.1* 9.1* 10.7* HCT 26.4* 28.2* 33.2*  
 195 249 INR  --   --  1.2 Recent Labs 05/18/20 
2239 05/18/20 1984 05/17/20 2126 05/17/20 
1520  140  --  143  
K 5.0 5.5* 6.3* 5.8*  
 106  --  108* CO2 19* 24  --  27  
GLU 98 200*  --  178* BUN 74* 58*  --  45* CREA 4.64* 3.71*  --  2.51* CA 7.6* 8.5  --  9.2 ALB  --   --   --  3.6 SGOT  --   --   --  30 No results for input(s): LCAD, LAC in the last 72 hours. Patient Active Problem List  
Diagnosis Code  CAD (coronary artery disease) I25.10  
 S/P PTCA (percutaneous transluminal coronary angioplasty) Z98.61  
 Hypertension I10  
 PAF [facility]paroxysmal atrial fibrillation) ([facility]) I48.0  Dyslipidemia E78.5  Coronary atherosclerosis of native coronary artery I25.10  Carotid artery stenosis without cerebral infarction I65.29  
 Stroke [facility]) I63.9  TIA (transient ischemic attack) G45.9  CVA (cerebral vascular accident) ([facility] I63.9  Closed left hip fracture, initial encounter ([facility] S72.002A  TERRY (acute kidney injury) ([facility] N17.9  Hyperkalemia E87.5  Closed fracture of left shoulder S42. 92XA  Respiratory failure, post-operative ([facility] T66.329  Septic shock (HCC) A41.9, R65.21 Assessment:  (Medical Decision Making) Hospital Problems  Never Reviewed Codes Class Noted POA Septic shock (HCC) ICD-10-CM: A41.9, R65.21 ICD-9-CM: 038.9, 785.52, 995.92  5/19/2020 Unknown Likely from aspiration Respiratory failure, post-operative ([facility] ICD-10-CM: U40.537 ICD-9-CM: 518.51  5/18/2020 Unknown Ongoing. Hemodynamically unstable * (Principal) Closed left hip fracture, initial encounter [facility]) ICD-10-CM: G99.761F ICD-9-CM: 820.8  5/17/2020 Unknown Now post op TERRY (acute kidney injury) ([address] ICD-10-CM: N17.9 ICD-9-CM: 584.9  5/17/2020 Unknown Renal function worsening Hyperkalemia ICD-10-CM: E87.5 ICD-9-CM: 276.7  5/17/2020 Unknown Better on last ABG Closed fracture of left shoulder ICD-10-CM: S42. 92XA ICD-9-CM: 812.00  5/17/2020 Unknown CAD (coronary artery disease) (Chronic) ICD-10-CM: I25.10 ICD-9-CM: 414.00  10/6/2015 Yes Hypertension (Chronic) ICD-10-CM: I10 
ICD-9-CM: 401.9  10/6/2015 Yes PAF (paroxysmal atrial fibrillation) (HCC) (Chronic) ICD-10-CM: I48.0 ICD-9-CM: 427.31  10/6/2015 Yes Overview Signed 4/26/2017 12:59 PM by Nilesh Wise MD  
  [facility] 6 CVR at present Dyslipidemia (Chronic) ICD-10-CM: J82.7 ICD-9-CM: 272.4  10/6/2015 Yes Plan:  (Medical Decision Making) Culture blood, sputum, urine. Place CVL. Vanc and Zosyn. Levophed for BP support. HCO3 x 1. Serial ABGs. Follow labs. Nephrology following; may need dialysis if renal function continues to deteriorate. Check troponin again. Critically ill: E/M less procedures 32 min. -- 
 
More than 50% of the time documented was spent in face-to-face contact with the patient and in the care of the patient on the floor/unit where the patient is located.  
 
Khai Lopez MD

## 2020-05-19 NOTE — PROGRESS NOTES
Bedside shift report received from Laurie Gordon, [facility]. Pt unresponsive to all stimulus, pupils unequal and not reactive to light. L pupil > R pupil. Pt transported to CT for head CT. Esophageal temp 93.8, Afib on monitor - HR 70s-90s, O2 sat decreasing to 80s, MAP > 65. Lines: peripheral IV x2, L quad IJ Drains: OGT, phipps Airway: ETT Drips: sodium bicarb @ 150 mL/hr, 1st unit of blood/2 units infusing, levophed @ 26 mcg/min Skin assessed. L shoulder in sling, L hip dressing c/d/i. Feet cool, pale in color, absent pedal pulses, PT pulses found with doppler. Sacrum intact.

## 2020-05-19 NOTE — PROGRESS NOTES
His nurse telephoned and shared that the patient is in the End of Life state and that family members were present and they would like to have a  present.  contacted Father Nery Lyon and both the  and Father Nery Lyon went and provided assistance. Father Nery Lyon anointed the patient. A  Contact Card was left with the patient's wife, Karen Pope. SHAYNE Carvajal

## 2020-05-19 NOTE — PROGRESS NOTES
Ventilator check complete; patient has a #8. 0 ET tube secured at the 23 at the lip. Patient is sedated. Patient is not able to follow commands. Breath sounds are diminished. Trachea is midline, Negative for subcutaneous air, and chest excursion is symmetric. Patient is also Negative for cyanosis and is Negative for pitting edema. All alarms are set and audible. Resuscitation bag is at the head of the bed. Ventilator Settings Mode FIO2 Rate Tidal Volume Pressure PEEP I:E Ratio Pressure control  50 %   20    15 cmH20  8 cm H20  1:2.2 Peak airway pressure: 24 cm H2O Minute ventilation: 14.3 l/min ABG: No results for input(s): PH, PCO2, PO2, HCO3 in the last 72 hours.  
 
 
Basil Hilario, RT

## 2020-05-19 NOTE — PROGRESS NOTES
Dr. Yosi Obando notified of elevated BIS despite starting fentanyl & versed gtts. To give 2 mg versed bolus. Also notified of elevated lactic acid 22.6. Orders received to trend Q4 hours and give 2 L D5 1/2 NS boluses over 2 hours.

## 2020-05-19 NOTE — PROGRESS NOTES
Pharmacokinetic Consult to Pharmacist 
 
Myah Bundy is a 80 y.o. male being treated for HAP with vancomycin. Height: 5' 8\" (172.7 cm)  Weight: 64 kg (141 lb) Lab Results Component Value Date/Time BUN 74 (H) 05/18/2020 10:39 PM  
 Creatinine 4.64 (H) 05/18/2020 10:39 PM  
 WBC 5.2 05/18/2020 05:15 PM  
  
Estimated Creatinine Clearance: 10.5 mL/min (A) (based on SCr of 4.64 mg/dL (H)). Day 1 of vancomycin. Goal trough is 15-20. Vancomycin dose initiated at 1250 mg x 1 dose; followed by intermittent dosing. Will continue to follow patient and order levels when clinically indicated. Thank you, Lc Romo, PharmD.

## 2020-05-19 NOTE — PROGRESS NOTES
Pt's pupils are uneven and not reactive. L pupil 4 mm and R pupil 3 mm. Dr. Saida Key notified. Stat CT without contrast ordered. Pt /57 HR 71 Levo infusing at 26 mcg/min. Rectal Temp 93.7 bare hugger applied. MD aware.

## 2020-05-19 NOTE — PROGRESS NOTES
called by nurse at time of death, spoke with staff, family not present, offered prayer with patient at bedside. Pratima Read  PRN

## 2020-05-19 NOTE — PROGRESS NOTES
Death Note Dottie Retana 
Admission date:  2020 Admitting Diagnosis:  Closed left hip fracture, initial encounter ([facility] Dottie Haro Called to evaluate patient who was found by nursing to have . On arrival patient was found to be unresponsive. Physical exam was performed and the patient was noted to be apneic, asystolic, pupils were fixed and dilated, with absent occulocephalic reflex. Patient pronounced dead at 4:15pm on 2020. Cause of death felt to be sepsis.  
 
Olive Nava MD

## 2020-05-19 NOTE — PROGRESS NOTES
Pt asystole on monitor. chaplain Nevaeh and house supervisor notified. OK to remove all lines per house supervisor. Pt's wife, Alex Ugarte, notified.

## 2020-05-19 NOTE — PROGRESS NOTES
Postmortem care provided, all lines removed. [facility] at bedside to transport pt.

## 2020-05-19 NOTE — PROGRESS NOTES
Occupational Therapy Note: 
Therapist is discharging patient from OT at this time due to decline in medical status and transfer to ICU. Please reconsult OT when MD deems patient appropriate for continued services. Thank you.  
Belinda Zurita, OTR/L

## 2020-05-19 NOTE — PROCEDURES
CENTRAL VENOUS LINE PLACEMENT Chlorohexidine Skin Antiseptic: Yes Hand Hygiene: Yes 
Maximal Barrier Precautions: Yes Optimal Catheter Site Selection: Yes 
Sterile Ultrasound Technique: Yes Indication:  Need for central access; shock Summary: 
 
Informed consent was able to be obtained from the patient's family  Using ultrasound guidance, a quad lumen catheter was placed in the L internal jugular vein to 15 cm with no difficulty. Sterile Seldinger technique was used after local 1% Lidocaine anesthesia. Aspiration of air was not evident during the procedure. All ports were aspirated free of air and flushed with sterile NSS. The line was sewn in place at the insertion site and using two of  the hubs molded into the catheter. A chest x-ray is pending. Nikki Collazo MD 
 
ADDENDUM: 
 
 
 
CVL in good position with no PTX.  
 
Roger Steele MD

## 2020-05-19 NOTE — PROGRESS NOTES
Pt BP 79/39 HR 67 RR 17. Dr. Li Patches notified. Propofol gtt stopped. Orders received to start levo gtt.

## 2020-05-19 NOTE — PROGRESS NOTES
I spoke with wife. Patient has severe hypoxic respiratory failure after aspiration yesterday. He is now critically ill, DNR and family does not want any escalation of care. Ongoing renal failure likely due to ATN, no plans for dialysis. Will sign off.

## 2020-05-19 NOTE — PROGRESS NOTES
Progress Note Patient: Dottie Retana MRN: [id number]  SSN: [id number] YOB: 1934  Age: 80 y.o. Sex: male Admit Date: 5/17/2020 LOS: 2 days Subjective:  
 
Patient is intubated and sedated. Apparently he did not do well after surgical intervention. He is currently on pressors and intubated Objective:  
 
Vitals:  
 05/19/20 5088 05/19/20 8054 05/19/20 8206 05/19/20 1727 BP: 107/53 110/54 Pulse: 72 85 Resp: 20 20 Temp:   (!) 93.7 °F (34.3 °C) SpO2: 98% 97% Weight:    69.4 kg (153 lb) Height:      
  
 
Intake and Output: 
Current Shift: 05/19 0701 - 05/19 1900 In: 1429.7 [I.V.:1429.7] Out: - Last three shifts: 05/17 1901 - 05/19 0700 In: 2401.7 [I.V.:2401.7] Out: 500 [Urine:450] Patient is intubated Skin - dressing clean dry and intact Motor and sensory function intact in LEFT LOWER extremity Pulses palpable in LEFT LOWER extremity Lab/Data Review: CBC:  
Lab Results Component Value Date/Time WBC 3.8 (L) 05/19/2020 02:58 AM  
 HGB 6.5 (LL) 05/19/2020 02:58 AM  
 HCT 21.9 (L) 05/19/2020 02:58 AM  
  05/19/2020 02:58 AM  
  
 
 
Assessment:  
 
Acute postop blood loss anemia with hemoglobin at 6.5, POD from nonoperative treatment left humerus fracture and operative intervention left hip fracture Plan:  
 
Obviously his overall status is very concerning. He is postop day 1 from repair of his left hip fracture and very critically ill. I do appreciate the critical care team taking care of this gentleman. Orthopedic team will continue to follow Signed By: Marta Siddiqi MD   
 May 19, 2020

## 2020-05-19 NOTE — PROGRESS NOTES
Pt BP 84/45. Urine output 25 ml since 1900. Dr. Elana Weber notified. Telephone orders received to increase NS to 150 ml/hr

## 2020-05-19 NOTE — PROGRESS NOTES
Discussion held with wife and sister at the bedside. Informed them of his severely critical condition, unable to get sats above 80's even with 100% FiO2, high peep, and paralytics. On high doses of pressors. Explained the very high likelihood that he would not survive this hospitalization, possibly even through the day. She asked how we should proceed from here. Discussed options: continue current supportive care understanding that cardiac arrest was likely to happen eventually and suggested that CPR not be performed, continue current care but not escalate further and no CPR, or withdrawal of current care knowing that he would likely pass away quickly. At this point she would like to keep in place the current levels of support, not escalate, and not perform CPR if he has cardiac instability or arrest.  
 
DNR order placed and will not escalate care. Cont vent, pressors, and paralytics for now. Will hold on proning efforts. Hold on frequent lab draws. Cont abx for now. Depending on his course through the day will readdress with her as needed.   
 
Judene Phoenix, MD

## 2020-05-19 NOTE — INTERDISCIPLINARY ROUNDS
Interdisciplinary team rounds were held 5/19/2020 with the following team members:Nursing, Palliative Care, Pastoral Care and Physician. Plan of care discussed. See clinical pathway and/or care plan for interventions and desired outcomes.

## 2020-05-19 NOTE — DISCHARGE SUMMARY
Death Summary Gavino Davison 
Admission date:  5/17/2020 Discharge date:  5/19/2020 Admitting Diagnosis:  Closed left hip fracture, initial encounter ([facility] Discharge Diagnosis:   
Problem List as of 5/19/2020 Never Reviewed Codes Class Noted - Resolved Severe sepsis with septic shock (HCC) ICD-10-CM: A41.9, R65.21 ICD-9-CM: 038.9, 995.92, 785.52  5/19/2020 - Present ARDS (adult respiratory distress syndrome) ([facility] ICD-10-CM: W46 
ICD-9-CM: 518.52  5/19/2020 - Present Aspiration into airway ICD-10-CM: T17.908A ICD-9-CM: 934.9  5/19/2020 - Present Respiratory failure, post-operative ([facility] ICD-10-CM: U80.151 ICD-9-CM: 518.51  5/18/2020 - Present * (Principal) Closed left hip fracture, initial encounter [facility]) ICD-10-CM: J61.580Y ICD-9-CM: 820.8  5/17/2020 - Present TERRY (acute kidney injury) ([facility] ICD-10-CM: N17.9 ICD-9-CM: 584.9  5/17/2020 - Present Hyperkalemia ICD-10-CM: E87.5 ICD-9-CM: 276.7  5/17/2020 - Present Closed fracture of left shoulder ICD-10-CM: S42. 92XA ICD-9-CM: 812.00  5/17/2020 - Present CVA (cerebral vascular accident) [facility]) ICD-10-CM: I63.9 ICD-9-CM: 434.91  2/2/2020 - Present  
   
 TIA (transient ischemic attack) ICD-10-CM: G45.9 ICD-9-CM: 435.9  1/31/2020 - Present Stroke [facility]) ICD-10-CM: I63.9 ICD-9-CM: 434.91  9/14/2017 - Present Coronary atherosclerosis of native coronary artery ICD-10-CM: I25.10 ICD-9-CM: 414.01  1/18/2016 - Present Carotid artery stenosis without cerebral infarction ICD-10-CM: I65.29 ICD-9-CM: 433.10  1/18/2016 - Present CAD (coronary artery disease) (Chronic) ICD-10-CM: I25.10 ICD-9-CM: 414.00  10/6/2015 - Present S/P PTCA (percutaneous transluminal coronary angioplasty) ICD-10-CM: Z98.61 ICD-9-CM: V45.82  10/6/2015 - Present Hypertension (Chronic) ICD-10-CM: I10 
ICD-9-CM: 401.9  10/6/2015 - Present PAF (paroxysmal atrial fibrillation) (HCC) (Chronic) ICD-10-CM: I48.0 ICD-9-CM: 427.31  10/6/2015 - Present Overview Signed 4/26/2017 12:59 PM by MD shree Tate 6 Dyslipidemia (Chronic) ICD-10-CM: F18.2 ICD-9-CM: 272.4  10/6/2015 - Present Consultants: 
Pulmonology Nephrology Orthopedic surgery Studies/Procedures: CXR 5/17/20 Impression: 1. Left proximal humerus fracture. 2. Postoperative changes. Femur xray 5/17/20 IMPRESSION: No additional acute fracture identified. L HIP Xray 5/17 IMPRESSION: No additional acute fracture identified. L Humerus Xray 5/17 IMPRESSION: Mildly displaced and impacted left proximal humeral metadiaphyseal 
Fracture. L Shoulder Xray 5/17 IMPRESSION: Left proximal humeral metadiaphyseal fracture. CT Head 5/17 1. Findings most compatible with mild chronic small vessel ischemic changes. 2. Remote right MCA territory infarct. 3. No evidence of acute intracranial abnormality. US retroperitoneum 5/18 1. No acute sonographic abnormality. 2.  Atrophic, mildly echogenic right kidney. 3.  Nonobstructing 1.5 cm stone in the right upper pole. CT Head 5/19/20 1. No significant change compared to the previous study. CXR 5/19/20 IMPRESSION: Improved aeration of the left lower lobe Hospital course: 
INITIAL PRESENTATION: 
81 yo CM with past history of Afib (on Eliquis), prior CVA, CAD, GERd, HTN, and HLD presents via EMS from home. Patient fell while reaching for towel after finishing in the shower. His wife tried to help him to the ground. Patient did not bump his head nor did he loss consciousness or black out. At bedside, he currently denies fevers/chills, chest pain, shortness of breath, abdominal pain, nausea/vomiting, or diarrhea.  
  
ED Course: x-rays show left hip fracture and left shoulder fracture. Labs show SCr of 2.5.  CT head negative for acute events, does show old infarction. K of 5.8. CK of 593. EKG shows Afib. Given Dilaudid and Zofran in ED. Orthopedic surgery consulted. Hospitalist called for admission. INTERVAL HISTORY: 
The patient was admitted by the hospitalist service and orthopedic surgery was consulted due to L hip and shoulder fractures. Nephrology was consulted due to acute kidney failure with initial plan to continue IV fluids. He was taken to surgery 5/18 foropen treatment of L proximal femur fratcture with nail fixation. The patient vomited during induction and intubation. He had intraoperative hypoxia and was not extubated post op but remained intubated in the PACU. On arrival to PACU sats were only in the 70's on 50% FiO2 so he was placed on 100% O2. Early this morning he developed severe sepsis with drop in his BP to the 50's, requiring high doses of levophed. He was noted to be severely acidotic with marginal O2 levels even on 100% O2. He was started on zosyn and vanc to cover aspiration pna. He was given bicarb as well. This morning he was still hypoxic despite 100% FiO2 and his PEEP was increased with only temporary improved, followed by initiation of paralytics. Lactate was checked at this time and was elevated at 22.6. Wife was called and informed of his critical state. She arrived and was informed that chances of mortality were very high. Discussed that CPR in this state was unlikely to be successful and she initially decided to make him DNR. After a few hours of monitoring his condition she decided to compassionately extubate him and stop life sustaining measures. His paralytics and sedation were turned off followed by his pressors. He went into asystole prior to being removed from the ventilator. Final: 
--Pronounced dead at 4:15pm. 
--Total discharge greater than 30 minutes in duration.  
 
Herminio Muller MD

## 2020-05-19 NOTE — PROGRESS NOTES
Vecuronium bolus given and gtt started. BIS decreased to 20s-30s. O2 sat 70s-80s. Dr. Paul Boucher notified. Pt on 100% FiO2.  Pt's wife on the way to the hospital.

## 2020-05-19 NOTE — PROGRESS NOTES
Dr. Yecenia Wood aware of repeat ABG. To give additional amp bicarb and increase rate on gtt to 200 mL/hr. Also to call wife and update on overnight's events.

## 2020-05-19 NOTE — PROGRESS NOTES
Pt BP 62/34. Dr. Portillo Goes at bedside to place CVL. Verbal orders received for 1 amp bicarb IV push.

## 2020-05-19 NOTE — PROGRESS NOTES
Dr. Emy Astorga at bedside to discuss goals of care with pt's wife. Wife wishes to pursue comfort measures. To stop paralytic, sedation and levo and wait for paralytic to wear out of system before extubation. Wife does not wish to be at bedside for the process.

## 2020-05-19 NOTE — PROGRESS NOTES
Chart reviewed s/p tx to [facility] ICU post op hip repair in OR. Pt did not have shoulder surgery due to aspiration per MD notes. Currently intubated/vent. D/C POC for STR. Referral sent by CM prior to surgery. CM will continue to follow for any assist and d/c POC when stable or change in d/c needs.

## 2020-05-20 LAB
ABO + RH BLD: NORMAL
BLD PROD TYP BPU: NORMAL
BLD PROD TYP BPU: NORMAL
BLOOD BANK CMNT PATIENT-IMP: NORMAL
BLOOD GROUP ANTIBODIES SERPL: NORMAL
BPU ID: NORMAL
BPU ID: NORMAL
CROSSMATCH RESULT,%XM: NORMAL
CROSSMATCH RESULT,%XM: NORMAL
SPECIMEN EXP DATE BLD: NORMAL
STATUS OF UNIT,%ST: NORMAL
STATUS OF UNIT,%ST: NORMAL
UNIT DIVISION, %UDIV: 0
UNIT DIVISION, %UDIV: 0

## 2020-05-20 NOTE — PROGRESS NOTES
Care Management Interventions PCP Verified by CM: Yes Transition of Care Consult (CM Consult): Discharge Planning, SNF Physical Therapy Consult: Yes Occupational Therapy Consult: Yes Current Support Network: Lives with Spouse, Own Home Confirm Follow Up Transport: Family The Plan for Transition of Care is Related to the Following Treatment Goals : rehab in an effort to regain strength and return to baseline functioning for a safe transition to home. The Patient and/or Patient Representative was Provided with a Choice of Provider and Agrees with the Discharge Plan?: Yes Name of the Patient Representative Who was Provided with a Choice of Provider and Agrees with the Discharge Plan: spouse Freedom of Choice List was Provided with Basic Dialogue that Supports the Patient's Individualized Plan of Care/Goals, Treatment Preferences and Shares the Quality Data Associated with the Providers?: Yes Discharge Location Discharge Placement:

## 2020-05-21 LAB
BACTERIA SPEC CULT: ABNORMAL
BACTERIA SPEC CULT: ABNORMAL
EMERGENT DISEASE PANEL, EDPR: NOT DETECTED
GRAM STN SPEC: ABNORMAL
SERVICE CMNT-IMP: ABNORMAL

## 2020-05-24 LAB
BACTERIA SPEC CULT: NORMAL
BACTERIA SPEC CULT: NORMAL
SERVICE CMNT-IMP: NORMAL
SERVICE CMNT-IMP: NORMAL

## 2022-02-25 NOTE — CONSULTS
Patient seen and examined. A 66-year-old male admitted for right MCA infarct with right carotid stenosis greater than 50% and left internal carotid artery approximately 80%. On examination patient is sleeping so past medical history was able to obtain from patient's wife and daughter. Patient with persistent left upper extremity weakness and slurred speech. I reviewed the CTA was confirmed the right carotid artery approximately 50% stenosis. Will recommend 81 mg of aspirin and antiplatelet therapy. Patient follow-up in office in 1 month. No need for any surgical intervention would likely need to have neurological deficit resolved prior to any surgical intervention. This was discussed with patient family in detail.     Bonnie Velazquez No

## 2023-01-12 NOTE — ED NOTES
I have reviewed discharge instructions with the patient. The patient verbalized understanding. Patient left ED via Discharge Method: ambulatory to Home with wife. Opportunity for questions and clarification provided. Patient given 0 scripts. To continue your aftercare when you leave the hospital, you may receive an automated call from our care team to check in on how you are doing. This is a free service and part of our promise to provide the best care and service to meet your aftercare needs.  If you have questions, or wish to unsubscribe from this service please call [phone]. Thank you for Choosing our [facility]. Detail Level: Generalized

## 2023-05-01 NOTE — PROGRESS NOTES
NEUROSURGERY- NEW PREVISIT PLANNING       Record Status/Location     Referring Provider In referrals [facility] Orlando Braxton    Diagnosis In referral Spondylosis of lumbar region without myelopathy or radiculopathy  Primary Osteoarthritis   MRI (HEAD, NECK, SPINE) N/A No   CT N/A No   X-ray In chart Yes (if yes, when/where) [facility] 01/11/2022 (lumbar)   INJECTION In chart Yes (if yes, when/where) - 01/31/2023 (large joint inj)   PHYSICAL THERAPY In chart Yes (if yes, when/where) [facility] 01/11/2022   SURGERY N/A No               Pt compassionately extubated per family request.
